# Patient Record
Sex: MALE | Race: WHITE | NOT HISPANIC OR LATINO
[De-identification: names, ages, dates, MRNs, and addresses within clinical notes are randomized per-mention and may not be internally consistent; named-entity substitution may affect disease eponyms.]

---

## 2017-02-21 ENCOUNTER — APPOINTMENT (OUTPATIENT)
Dept: PULMONOLOGY | Facility: CLINIC | Age: 77
End: 2017-02-21

## 2017-02-21 VITALS
OXYGEN SATURATION: 100 % | WEIGHT: 200 LBS | BODY MASS INDEX: 29.62 KG/M2 | RESPIRATION RATE: 17 BRPM | SYSTOLIC BLOOD PRESSURE: 110 MMHG | DIASTOLIC BLOOD PRESSURE: 74 MMHG | HEIGHT: 69 IN | HEART RATE: 71 BPM

## 2017-06-19 ENCOUNTER — OUTPATIENT (OUTPATIENT)
Dept: OUTPATIENT SERVICES | Facility: HOSPITAL | Age: 77
LOS: 1 days | End: 2017-06-19
Payer: MEDICARE

## 2017-06-19 VITALS
TEMPERATURE: 98 F | WEIGHT: 192.9 LBS | HEART RATE: 77 BPM | RESPIRATION RATE: 16 BRPM | HEIGHT: 69 IN | SYSTOLIC BLOOD PRESSURE: 128 MMHG | DIASTOLIC BLOOD PRESSURE: 77 MMHG

## 2017-06-19 DIAGNOSIS — D21.21 BENIGN NEOPLASM OF CONNECTIVE AND OTHER SOFT TISSUE OF RIGHT LOWER LIMB, INCLUDING HIP: ICD-10-CM

## 2017-06-19 DIAGNOSIS — Z01.818 ENCOUNTER FOR OTHER PREPROCEDURAL EXAMINATION: ICD-10-CM

## 2017-06-19 DIAGNOSIS — Z98.890 OTHER SPECIFIED POSTPROCEDURAL STATES: Chronic | ICD-10-CM

## 2017-06-19 DIAGNOSIS — Z98.61 CORONARY ANGIOPLASTY STATUS: Chronic | ICD-10-CM

## 2017-06-19 LAB
ALBUMIN SERPL ELPH-MCNC: 3.6 G/DL — SIGNIFICANT CHANGE UP (ref 3.3–5)
ALP SERPL-CCNC: 53 U/L — SIGNIFICANT CHANGE UP (ref 40–120)
ALT FLD-CCNC: 28 U/L — SIGNIFICANT CHANGE UP (ref 12–78)
ANION GAP SERPL CALC-SCNC: 8 MMOL/L — SIGNIFICANT CHANGE UP (ref 5–17)
AST SERPL-CCNC: 21 U/L — SIGNIFICANT CHANGE UP (ref 15–37)
BILIRUB SERPL-MCNC: 0.6 MG/DL — SIGNIFICANT CHANGE UP (ref 0.2–1.2)
BUN SERPL-MCNC: 28 MG/DL — HIGH (ref 7–23)
CALCIUM SERPL-MCNC: 8.1 MG/DL — LOW (ref 8.5–10.1)
CHLORIDE SERPL-SCNC: 110 MMOL/L — HIGH (ref 96–108)
CO2 SERPL-SCNC: 26 MMOL/L — SIGNIFICANT CHANGE UP (ref 22–31)
CREAT SERPL-MCNC: 1.5 MG/DL — HIGH (ref 0.5–1.3)
GLUCOSE SERPL-MCNC: 102 MG/DL — HIGH (ref 70–99)
HCT VFR BLD CALC: 44.6 % — SIGNIFICANT CHANGE UP (ref 39–50)
HGB BLD-MCNC: 14.9 G/DL — SIGNIFICANT CHANGE UP (ref 13–17)
MCHC RBC-ENTMCNC: 31.7 PG — SIGNIFICANT CHANGE UP (ref 27–34)
MCHC RBC-ENTMCNC: 33.3 GM/DL — SIGNIFICANT CHANGE UP (ref 32–36)
MCV RBC AUTO: 95.1 FL — SIGNIFICANT CHANGE UP (ref 80–100)
PLATELET # BLD AUTO: 192 K/UL — SIGNIFICANT CHANGE UP (ref 150–400)
POTASSIUM SERPL-MCNC: 4 MMOL/L — SIGNIFICANT CHANGE UP (ref 3.5–5.3)
POTASSIUM SERPL-SCNC: 4 MMOL/L — SIGNIFICANT CHANGE UP (ref 3.5–5.3)
PROT SERPL-MCNC: 6.5 G/DL — SIGNIFICANT CHANGE UP (ref 6–8.3)
RBC # BLD: 4.69 M/UL — SIGNIFICANT CHANGE UP (ref 4.2–5.8)
RBC # FLD: 12.7 % — SIGNIFICANT CHANGE UP (ref 10.3–14.5)
SODIUM SERPL-SCNC: 144 MMOL/L — SIGNIFICANT CHANGE UP (ref 135–145)
WBC # BLD: 6.5 K/UL — SIGNIFICANT CHANGE UP (ref 3.8–10.5)
WBC # FLD AUTO: 6.5 K/UL — SIGNIFICANT CHANGE UP (ref 3.8–10.5)

## 2017-06-19 PROCEDURE — 93010 ELECTROCARDIOGRAM REPORT: CPT | Mod: NC

## 2017-06-19 PROCEDURE — 93005 ELECTROCARDIOGRAM TRACING: CPT

## 2017-06-19 PROCEDURE — G0463: CPT

## 2017-06-19 PROCEDURE — 80053 COMPREHEN METABOLIC PANEL: CPT

## 2017-06-19 PROCEDURE — 85027 COMPLETE CBC AUTOMATED: CPT

## 2017-06-19 RX ORDER — FINASTERIDE 5 MG/1
1 TABLET, FILM COATED ORAL
Qty: 0 | Refills: 0 | COMMUNITY

## 2017-06-19 NOTE — H&P PST ADULT - PMH
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

## 2017-06-19 NOTE — H&P PST ADULT - MUSCULOSKELETAL
details… detailed exam no joint warmth/decreased ROM/diminished strength/decreased ROM due to pain/no joint swelling/no joint erythema/right shoulder no joint erythema/no joint swelling/no joint warmth/ROM intact

## 2017-06-19 NOTE — H&P PST ADULT - NSANTHOSAYNRD_GEN_A_CORE
No. KARAN screening performed.  STOP BANG Legend: 0-2 = LOW Risk; 3-4 = INTERMEDIATE Risk; 5-8 = HIGH Risk

## 2017-06-19 NOTE — H&P PST ADULT - PROBLEM SELECTOR PLAN 2
Labs- CBC, CMP and EKG  CC with Dr. Matias Rea  Pre op and Hibiclens instructions reviewed and given. Instructed to take her omeprazole, Doxazosin and Finasteride am of surgery with a sip of water.

## 2017-06-19 NOTE — H&P PST ADULT - PSH
H/O Shoulder rotated cuff surgery  Right  History of Appendectomy    History of Tonsillectomy    IH (Inguinal Hernia)  Bilateral age 7  S/P PTCA (percutaneous transluminal coronary angioplasty)  2002 has 2 stents  S/P rotator cuff repair  right 2015

## 2017-06-19 NOTE — H&P PST ADULT - PROBLEM SELECTOR PLAN 3
Patient has a BMS. Currently not on ASA due to hematuria. CC with Dr. Matias Rea.  ASA as per cardiology.

## 2017-06-23 DIAGNOSIS — D21.21 BENIGN NEOPLASM OF CONNECTIVE AND OTHER SOFT TISSUE OF RIGHT LOWER LIMB, INCLUDING HIP: ICD-10-CM

## 2017-06-23 DIAGNOSIS — Z01.818 ENCOUNTER FOR OTHER PREPROCEDURAL EXAMINATION: ICD-10-CM

## 2017-06-23 DIAGNOSIS — Z95.5 PRESENCE OF CORONARY ANGIOPLASTY IMPLANT AND GRAFT: ICD-10-CM

## 2017-08-21 ENCOUNTER — APPOINTMENT (OUTPATIENT)
Dept: PULMONOLOGY | Facility: CLINIC | Age: 77
End: 2017-08-21
Payer: MEDICARE

## 2017-08-21 VITALS
OXYGEN SATURATION: 98 % | DIASTOLIC BLOOD PRESSURE: 80 MMHG | HEART RATE: 73 BPM | RESPIRATION RATE: 17 BRPM | SYSTOLIC BLOOD PRESSURE: 110 MMHG | BODY MASS INDEX: 29.62 KG/M2 | HEIGHT: 69 IN | WEIGHT: 200 LBS

## 2017-08-21 PROCEDURE — 95012 NITRIC OXIDE EXP GAS DETER: CPT

## 2017-08-21 PROCEDURE — 94010 BREATHING CAPACITY TEST: CPT

## 2017-08-21 PROCEDURE — 99214 OFFICE O/P EST MOD 30 MIN: CPT | Mod: 25

## 2017-10-31 ENCOUNTER — FORM ENCOUNTER (OUTPATIENT)
Age: 77
End: 2017-10-31

## 2017-11-01 ENCOUNTER — OUTPATIENT (OUTPATIENT)
Dept: OUTPATIENT SERVICES | Facility: HOSPITAL | Age: 77
LOS: 1 days | End: 2017-11-01
Payer: MEDICARE

## 2017-11-01 ENCOUNTER — APPOINTMENT (OUTPATIENT)
Dept: CT IMAGING | Facility: CLINIC | Age: 77
End: 2017-11-01
Payer: MEDICARE

## 2017-11-01 DIAGNOSIS — Z98.890 OTHER SPECIFIED POSTPROCEDURAL STATES: Chronic | ICD-10-CM

## 2017-11-01 DIAGNOSIS — Z98.61 CORONARY ANGIOPLASTY STATUS: Chronic | ICD-10-CM

## 2017-11-01 DIAGNOSIS — R93.8 ABNORMAL FINDINGS ON DIAGNOSTIC IMAGING OF OTHER SPECIFIED BODY STRUCTURES: ICD-10-CM

## 2017-11-01 PROCEDURE — 71250 CT THORAX DX C-: CPT

## 2017-11-01 PROCEDURE — 71250 CT THORAX DX C-: CPT | Mod: 26

## 2017-12-25 NOTE — H&P PST ADULT - ABILITY TO HEAR (WITH HEARING AID OR HEARING APPLIANCE IF NORMALLY USED):
25-Dec-2017 21:20
Uses hearing aids bilaterally/Mildly to Moderately Impaired: difficulty hearing in some environments or speaker may need to increase volume or speak distinctly

## 2018-03-05 ENCOUNTER — APPOINTMENT (OUTPATIENT)
Dept: PULMONOLOGY | Facility: CLINIC | Age: 78
End: 2018-03-05
Payer: MEDICARE

## 2018-03-05 VITALS
BODY MASS INDEX: 28.14 KG/M2 | SYSTOLIC BLOOD PRESSURE: 110 MMHG | RESPIRATION RATE: 17 BRPM | DIASTOLIC BLOOD PRESSURE: 80 MMHG | WEIGHT: 190 LBS | HEIGHT: 69 IN | OXYGEN SATURATION: 100 % | HEART RATE: 73 BPM

## 2018-03-05 PROCEDURE — 99214 OFFICE O/P EST MOD 30 MIN: CPT | Mod: 25

## 2018-03-05 PROCEDURE — 94010 BREATHING CAPACITY TEST: CPT

## 2018-03-28 ENCOUNTER — NON-APPOINTMENT (OUTPATIENT)
Age: 78
End: 2018-03-28

## 2018-03-28 ENCOUNTER — APPOINTMENT (OUTPATIENT)
Dept: CARDIOLOGY | Facility: CLINIC | Age: 78
End: 2018-03-28
Payer: MEDICARE

## 2018-03-28 VITALS
BODY MASS INDEX: 28.14 KG/M2 | DIASTOLIC BLOOD PRESSURE: 78 MMHG | SYSTOLIC BLOOD PRESSURE: 126 MMHG | RESPIRATION RATE: 18 BRPM | HEIGHT: 69 IN | HEART RATE: 70 BPM | WEIGHT: 190 LBS

## 2018-03-28 PROCEDURE — 99213 OFFICE O/P EST LOW 20 MIN: CPT

## 2018-03-28 PROCEDURE — 93000 ELECTROCARDIOGRAM COMPLETE: CPT

## 2018-04-13 ENCOUNTER — RX RENEWAL (OUTPATIENT)
Age: 78
End: 2018-04-13

## 2018-05-16 ENCOUNTER — RX RENEWAL (OUTPATIENT)
Age: 78
End: 2018-05-16

## 2018-07-11 ENCOUNTER — NON-APPOINTMENT (OUTPATIENT)
Age: 78
End: 2018-07-11

## 2018-07-11 ENCOUNTER — APPOINTMENT (OUTPATIENT)
Dept: CARDIOLOGY | Facility: CLINIC | Age: 78
End: 2018-07-11
Payer: MEDICARE

## 2018-07-11 VITALS
BODY MASS INDEX: 27.11 KG/M2 | DIASTOLIC BLOOD PRESSURE: 80 MMHG | WEIGHT: 183 LBS | SYSTOLIC BLOOD PRESSURE: 130 MMHG | HEART RATE: 77 BPM | HEIGHT: 69 IN | RESPIRATION RATE: 16 BRPM

## 2018-07-11 PROCEDURE — 93000 ELECTROCARDIOGRAM COMPLETE: CPT

## 2018-07-11 PROCEDURE — 99214 OFFICE O/P EST MOD 30 MIN: CPT

## 2018-09-05 ENCOUNTER — NON-APPOINTMENT (OUTPATIENT)
Age: 78
End: 2018-09-05

## 2018-09-05 ENCOUNTER — APPOINTMENT (OUTPATIENT)
Dept: PULMONOLOGY | Facility: CLINIC | Age: 78
End: 2018-09-05
Payer: MEDICARE

## 2018-09-05 VITALS
BODY MASS INDEX: 27.7 KG/M2 | SYSTOLIC BLOOD PRESSURE: 110 MMHG | RESPIRATION RATE: 17 BRPM | DIASTOLIC BLOOD PRESSURE: 70 MMHG | OXYGEN SATURATION: 100 % | WEIGHT: 187 LBS | HEART RATE: 71 BPM | HEIGHT: 69 IN

## 2018-09-05 PROBLEM — D21.21 BENIGN NEOPLASM OF CONNECTIVE AND OTHER SOFT TISSUE OF RIGHT LOWER LIMB, INCLUDING HIP: Chronic | Status: ACTIVE | Noted: 2017-06-23

## 2018-09-05 PROBLEM — R22.41 LOCALIZED SWELLING, MASS AND LUMP, RIGHT LOWER LIMB: Chronic | Status: ACTIVE | Noted: 2017-06-19

## 2018-09-05 PROCEDURE — 95012 NITRIC OXIDE EXP GAS DETER: CPT

## 2018-09-05 PROCEDURE — 94010 BREATHING CAPACITY TEST: CPT

## 2018-09-05 PROCEDURE — 99214 OFFICE O/P EST MOD 30 MIN: CPT | Mod: 25

## 2018-09-05 RX ORDER — CHOLECALCIFEROL (VITAMIN D3) 25 MCG
25 MCG TABLET ORAL
Refills: 0 | Status: ACTIVE | COMMUNITY

## 2018-09-05 NOTE — PROCEDURE
[FreeTextEntry1] : PFT- spi reveals normal flows; FEV1 was 2.80 L which is 97% of predicted; normal lung volumes; normal flow volume loop \par \par FENO was 8; a normal value being less than 25\par Fractional exhaled nitric oxide (FENO) is regarded as a simple, noninvasive method for assessing eosinophilic airway inflammation. Produced by a variety of cells within the lung, nitric oxide (NO) concentrations are generally low in healthy individuals. However, high concentrations of NO appear to be involved in nonspecific host defense mechanisms and chronic inflammatory diseases such as asthma. The American Thor[employer] Society (ATS) therefore has recommended using FENO to aid in the diagnosis and monitoring of eosinophilic airway inflammation and asthma, and for identifying steroid responsive individuals whose chronic respiratory symptoms may be caused by airway inflammation. \par \par

## 2018-09-05 NOTE — ADDENDUM
[FreeTextEntry1] : Documented by Ciarra Keller acting as a scribe for Dr. Raymundo Rai on 9/5/18.\par All medical record entries made by the Scribe were at my, Dr. Raymundo Rai's, direction and personally dictated by me on 9/5/18. I have reviewed the chart and agree that the record accurately reflects my personal performance of the history, physical exam, assessment and plan. I have also personally directed, reviewed, and agree with the discharge instructions. \par \par \par \par

## 2018-09-05 NOTE — ASSESSMENT
[FreeTextEntry1] : Mr. Hassan is stable from a pulmonary perspective, exercising and asymptomatic. He has a history of asthma, COPD, abnormal CT, CAD, OSAS, and RLS\par \par Problem one: lung cancer screening\par -f/u chest CT 11/2018 \par \par Problem two: asthma/COPD\par -Quiet off all mediations at this time\par \par Asthma is believed to be caused by inherited (genetic) and environmental factor, but its exact cause is unknown. Asthma may be triggered by allergens, lung infections, or irritants in the air. Asthma triggers are different for each person.\par \par -COPD is a progressive disease and although it can’t be cured , appropriate management can slow its progression, reduce frequency and severity of exacerbations, and improve symptoms and the patient quality of life. Hospitalizations are the greatest contributor to the total COPD costs and account for up to 87% of total COPD related costs. Exacerbations are the main cause of admissions and subsequently account for the 40-75% of COPD costs. Inhaled maintenance therapy reduces the incidence of exacerbations in patients with stable COPD. Incorrect inhaler use and nonadherence are major obstacles to achieving COPD treatment goals. Many COPD patients have challenges (impaired inhalation, limited dexterity, reduced cognition: that limit their ability to correctly use their COPD treatment devices resulting in reduced symptom control. Of most importance is smoking cessation and early intervention with respiratory illnesses and contemplation for pulmonary rehab to enhance quality of life.\par \par Problem three: GERD\par -continue Nexium 40 mg QAM\par \par -Rule of 2's- Avoid eating too much, too late, too poorly, too spicy, or two hours before bed \par -Things to avoid including overeating, spicy foods, tight clothing, eating within three hours of bed, this list is not all inclusive. \par -For treatment of reflux, possible options discussed including diet control, H2 blockers, PPIs, as well as coating motility agents discussed as treatment options. Timing of meals and proximity of last meal to sleep were discussed. If symptoms persist, a formal gastrointestinal evaluation is needed.\par \par Problem four: Allergies/ sinus\par -continue nasal saline PRN \par -recommended OTC antihistamines\par \par Environmental measures for allergies were encouraged including mattress and pillow cover, air purifier, and environmental controls.\par \par Problem five: OSAS\par -intolerable of other treatments - CPAP and dental device \par -Sleep guard recommended QD at night \par -continue to use Benadryl PRN \par -Recommended Oxy-aid\par -Discussed the risks/associations with coronary artery disease, atrial fibrillation, arrhythmia, memory loss, issues with concentration, stroke risk, hypertension, nocturia, chronic reflux/Interiano’s esophagus some but not all inclusive. Treatment options discussed including CPAP/BiPAP machine, oral appliance, ProVent therapy, Oxy-Aid by [employer], new technologies, or positional sleep. \par \par Problem six: Fatigue\par -CoQ-10 200 recommended \par -continue supplemental vitamin D\par \par Problem seven: RLS\par -off medications at this time\par \par Problem eight: health maintenance\par -recommended a yearly flu shot after October 15\par -recommended strep pneumonia vaccines: Prevnar-13 vaccine, followed by Pneumo vaccine 23 on year following\par -recommended early intervention for URIs\par -recommended osteoporosis evaluations\par -recommended early dermatological evaluations\par -recommended after the age of 50 to the age of 70, colonoscopy every 5 years\par -encouraged early intervention\par \par \par F/u in 6 months \par pt is encouraged to call or fax the office with any questions or concerns.

## 2018-09-05 NOTE — HISTORY OF PRESENT ILLNESS
[FreeTextEntry1] : Mr. Hassan is a 77 y/o male presenting for a visit for abnormal chest CT, allergic rhinitis, Asthma, GERD, KARAN, SOB. His chief complain is GERD. \par \par -he states that he is exercising and going to the gym\par -he states that he has occasional GERD/reflux\par -He states that he has been sleeping well\par -he states that his memory and concentration are stable\par -he denies SOB on inclines and stairs\par -He denies any headaches, nausea, vomiting, fever, chills, sweats, chest pain, chest pressure, diarrhea, constipation, dysphagia, dizziness, leg swelling, leg pain, itchy eyes, itchy ears, heartburn, , or sour taste in the mouth.\par \par

## 2018-09-05 NOTE — PHYSICAL EXAM

## 2018-09-05 NOTE — REASON FOR VISIT
[Follow-Up] : a follow-up visit [FreeTextEntry1] : abnormal chest CT, allergic rhinitis, Asthma, GERD, KARAN, SOB

## 2018-10-10 ENCOUNTER — RX RENEWAL (OUTPATIENT)
Age: 78
End: 2018-10-10

## 2018-11-12 ENCOUNTER — APPOINTMENT (OUTPATIENT)
Dept: CARDIOLOGY | Facility: CLINIC | Age: 78
End: 2018-11-12
Payer: MEDICARE

## 2018-11-12 ENCOUNTER — NON-APPOINTMENT (OUTPATIENT)
Age: 78
End: 2018-11-12

## 2018-11-12 VITALS
DIASTOLIC BLOOD PRESSURE: 74 MMHG | HEIGHT: 69 IN | OXYGEN SATURATION: 99 % | WEIGHT: 188 LBS | HEART RATE: 68 BPM | BODY MASS INDEX: 27.85 KG/M2 | SYSTOLIC BLOOD PRESSURE: 124 MMHG | RESPIRATION RATE: 15 BRPM

## 2018-11-12 PROCEDURE — 93000 ELECTROCARDIOGRAM COMPLETE: CPT

## 2018-11-12 PROCEDURE — 93922 UPR/L XTREMITY ART 2 LEVELS: CPT

## 2018-11-12 PROCEDURE — 99213 OFFICE O/P EST LOW 20 MIN: CPT

## 2018-11-12 PROCEDURE — ZZZZZ: CPT

## 2018-11-12 NOTE — REVIEW OF SYSTEMS
[Dyspnea on exertion] : dyspnea during exertion [Negative] : Heme/Lymph [Shortness Of Breath] : no shortness of breath [Chest  Pressure] : no chest pressure [Chest Pain] : no chest pain [Lower Ext Edema] : no extremity edema [Leg Claudication] : no intermittent leg claudication [Palpitations] : no palpitations

## 2018-11-12 NOTE — PHYSICAL EXAM
[General Appearance - Well Developed] : well developed [Normal Appearance] : normal appearance [Well Groomed] : well groomed [General Appearance - Well Nourished] : well nourished [No Deformities] : no deformities [General Appearance - In No Acute Distress] : no acute distress [Normal Conjunctiva] : the conjunctiva exhibited no abnormalities [Normal Oral Mucosa] : normal oral mucosa [Normal Oropharynx] : normal oropharynx [Normal Jugular Venous A Waves Present] : normal jugular venous A waves present [Normal Jugular Venous V Waves Present] : normal jugular venous V waves present [No Jugular Venous Jewell A Waves] : no jugular venous jewell A waves [Respiration, Rhythm And Depth] : normal respiratory rhythm and effort [Exaggerated Use Of Accessory Muscles For Inspiration] : no accessory muscle use [Auscultation Breath Sounds / Voice Sounds] : lungs were clear to auscultation bilaterally [Bowel Sounds] : normal bowel sounds [Abdomen Soft] : soft [Abnormal Walk] : normal gait [Nail Clubbing] : no clubbing of the fingernails [Cyanosis, Localized] : no localized cyanosis [Skin Color & Pigmentation] : normal skin color and pigmentation [] : no rash [Oriented To Time, Place, And Person] : oriented to person, place, and time [Affect] : the affect was normal [Mood] : the mood was normal [No Anxiety] : not feeling anxious [5th Left ICS - MCL] : palpated at the 5th LICS in the midclavicular line [Normal] : normal [No Precordial Heave] : no precordial heave was noted [Normal Rate] : normal [Rhythm Regular] : regular [Normal S1] : normal S1 [Normal S2] : normal S2 [No Gallop] : no gallop heard [No Murmur] : no murmurs heard [II] : a grade 2 [I] : a grade 1 [2+] : left 2+ [No Abnormalities] : the abdominal aorta was not enlarged and no bruit was heard [No Pitting Edema] : no pitting edema present [Rt] : varicose veins of the right leg noted [Lt] : varicose veins of the left leg noted [S3] : no S3 [S4] : no S4 [Right Carotid Bruit] : no bruit heard over the right carotid [Left Carotid Bruit] : no bruit heard over the left carotid [Right Femoral Bruit] : no bruit heard over the right femoral artery [Left Femoral Bruit] : no bruit heard over the left femoral artery

## 2018-11-12 NOTE — DISCUSSION/SUMMARY
[FreeTextEntry1] : This is a 77-year-old male with past medical history significant for coronary artery disease, status post stent to right coronary artery, hypertension, hyperlipidemia, statin intolerance, mitral valve regurgitation, who comes in for followup cardiac evaluation. \par The patient denies chest pain, shortness of breath, dizziness or syncope. He has been stable on his current therapy for lipid reduction. He is going on her cruise. He will have new blood work in December. Unfortunately his insurance would not cover his PCSK9 therapy. This may require another attempt of his LDL cholesterol is not at goal.\par \par The patient was on Praluent because of statin intolerance. Because of the increased cost of the medication and lack of coverage he is now on Crestor 5 mg daily, and Zetia 10 mg per day.\par Electrocardiogram done November 12, 2018 demonstrated normal sinus rhythm rate 66 beats per minute and is otherwise unremarkable.\par The patient gets occasional cramping in his legs on walking. Given his history of coronary artery disease and other risk factors, and ankle-brachial index was done today which revealed no significant peripheral vascular disease the\par \par He had an echo Doppler examination March 22, 2017 which revealed mild mitral and tricuspid valve regurgitation, with thinning and dyskinesis involving the intra-atrial septum the estimated ejection fraction 63%. He had a nuclear stress test done October 12, 2002 which demonstrated a small region of perfusion abnormality inferior wall consistent localized coronary artery disease. His estimated ejection fraction of 65%.\par He had a cardiac catheterization done November 10, 2011 which revealed a 40% lesion in the left anterior descending artery, luminal irregularities in the distal left main artery and circumflex artery, 20% tubular stenosis at the site of the prior stent. Medical therapy was recommended at that time.\par He had a bare-metal stent placed in the proximal mid right coronary artery in February 2002.\par He will followup with me in 3 months. We will repeat his blood work prior to that visit.\par The patient understands that aerobic exercises must be increased to 40 minutes 4 times per week. A detailed discussion of lifestyle modification was done today. The patient has a good understanding of the diagnosis, and treatment plan. Lifestyle modification was also outlined.

## 2018-11-12 NOTE — REASON FOR VISIT
[Follow-Up - Clinic] : a clinic follow-up of [Coronary Artery Disease] : coronary artery disease [Hyperlipidemia] : hyperlipidemia [Hypertension] : hypertension [Mitral Regurgitation] : mitral regurgitation [Palpitations] : palpitations [FreeTextEntry1] :  78-year-old male with a past medical history significant for coronary artery disease, s/p  stent to right coronary artery, hypertension, hyperlipidemia, statin intolerance, mitral valve regurgitation, who comes in for followup cardiac evaluation.

## 2018-11-14 ENCOUNTER — FORM ENCOUNTER (OUTPATIENT)
Age: 78
End: 2018-11-14

## 2018-11-15 ENCOUNTER — APPOINTMENT (OUTPATIENT)
Dept: CT IMAGING | Facility: CLINIC | Age: 78
End: 2018-11-15

## 2018-11-15 ENCOUNTER — OUTPATIENT (OUTPATIENT)
Dept: OUTPATIENT SERVICES | Facility: HOSPITAL | Age: 78
LOS: 1 days | End: 2018-11-15
Payer: MEDICARE

## 2018-11-15 DIAGNOSIS — Z00.8 ENCOUNTER FOR OTHER GENERAL EXAMINATION: ICD-10-CM

## 2018-11-15 DIAGNOSIS — Z98.61 CORONARY ANGIOPLASTY STATUS: Chronic | ICD-10-CM

## 2018-11-15 DIAGNOSIS — Z98.890 OTHER SPECIFIED POSTPROCEDURAL STATES: Chronic | ICD-10-CM

## 2018-11-15 PROCEDURE — 71250 CT THORAX DX C-: CPT

## 2018-11-15 PROCEDURE — 71250 CT THORAX DX C-: CPT | Mod: 26

## 2018-11-28 ENCOUNTER — RX RENEWAL (OUTPATIENT)
Age: 78
End: 2018-11-28

## 2019-01-09 ENCOUNTER — APPOINTMENT (OUTPATIENT)
Dept: PULMONOLOGY | Facility: CLINIC | Age: 79
End: 2019-01-09
Payer: MEDICARE

## 2019-01-09 VITALS
DIASTOLIC BLOOD PRESSURE: 70 MMHG | HEIGHT: 68 IN | WEIGHT: 186 LBS | RESPIRATION RATE: 17 BRPM | HEART RATE: 72 BPM | OXYGEN SATURATION: 98 % | SYSTOLIC BLOOD PRESSURE: 110 MMHG | BODY MASS INDEX: 28.19 KG/M2

## 2019-01-09 DIAGNOSIS — G62.9 POLYNEUROPATHY, UNSPECIFIED: ICD-10-CM

## 2019-01-09 PROCEDURE — 94010 BREATHING CAPACITY TEST: CPT

## 2019-01-09 PROCEDURE — 99214 OFFICE O/P EST MOD 30 MIN: CPT | Mod: 25

## 2019-01-09 PROCEDURE — 94729 DIFFUSING CAPACITY: CPT

## 2019-01-09 NOTE — ADDENDUM
[FreeTextEntry1] : Documented by Evelyn Devries acting as a scribe for Dr. Raymundo Rai on 01/09/2019.\par \par All medical record entries made by the Scribe were at my, Dr. Raymundo Rai's, direction and personally dictated by me on 01/09/2019. I have reviewed the chart and agree that the record accurately reflects my personal performance of the history, physical exam, assessment and plan. I have also personally directed, reviewed, and agree with the discharge instructions.

## 2019-01-09 NOTE — REASON FOR VISIT
[Follow-Up] : a follow-up visit [FreeTextEntry1] : new neuropathy (feet) abnormal chest CT, allergic rhinitis, Asthma, GERD, KARAN, SOB

## 2019-01-09 NOTE — HISTORY OF PRESENT ILLNESS
[FreeTextEntry1] : Mr. Hassan is a 77 y/o male presenting for a visit for abnormal chest CT, allergic rhinitis, Asthma, GERD, KARAN, SOB. His chief complaint is his neuropathy.\par -he states he has a new neuropathy in his left foot\par -he states his toes were initially painful to touch, now numb\par -he notes he is currently taking 400mg Gabapentin by Dr. Webster \par -he states his sleep is better but occasionally cannot fall asleep\par -he notes his bowels are regular\par -he states his memory is getting worse\par -he states his sinuses are clear\par -he notes he is not moving at night\par -he states he sleeps 5-6 hours each night\par -he denies any SOB, SOB on back, SOB in the middle of the night, coughing, wheezing, nausea, vomiting, fever, chills, sweats, chest pain, chest pressure, diarrhea, constipation, dysphagia, dizziness, heartburn, reflux, or sour taste in the mouth.

## 2019-01-09 NOTE — PROCEDURE
[FreeTextEntry1] : PFT-spi reveals mild obstructive dysfunction with FEV1 of   2.55   , which is  89% of predicted, with normal diffusion of 17.8 which is 89% of predicted,  normal flow volume loop \par \par \par Chest CT (11.15.2018) reveals the pulmonary nodules are unchanged since 03.19.2013

## 2019-01-09 NOTE — ASSESSMENT
[FreeTextEntry1] : Mr. Hassan is stable from a pulmonary perspective, exercising and asymptomatic. He has a history of asthma, COPD, abnormal CT, CAD, OSAS, RLS and new neuropathy (feet)\par \par Problem one: lung cancer screening (stable 11/18)\par -f/u chest CT 11/2019\par \par Problem two: asthma/COPD\par -Quiet off all mediations at this time\par \par Asthma is believed to be caused by inherited (genetic) and environmental factor, but its exact cause is unknown. Asthma may be triggered by allergens, lung infections, or irritants in the air. Asthma triggers are different for each person.\par \par -COPD is a progressive disease and although it can’t be cured , appropriate management can slow its progression, reduce frequency and severity of exacerbations, and improve symptoms and the patient quality of life. Hospitalizations are the greatest contributor to the total COPD costs and account for up to 87% of total COPD related costs. Exacerbations are the main cause of admissions and subsequently account for the 40-75% of COPD costs. Inhaled maintenance therapy reduces the incidence of exacerbations in patients with stable COPD. Incorrect inhaler use and nonadherence are major obstacles to achieving COPD treatment goals. Many COPD patients have challenges (impaired inhalation, limited dexterity, reduced cognition: that limit their ability to correctly use their COPD treatment devices resulting in reduced symptom control. Of most importance is smoking cessation and early intervention with respiratory illnesses and contemplation for pulmonary rehab to enhance quality of life.\par \par Problem three: GERD\par -continue Nexium 40 mg QAM\par \par -Rule of 2's- Avoid eating too much, too late, too poorly, too spicy, or two hours before bed \par -Things to avoid including overeating, spicy foods, tight clothing, eating within three hours of bed, this list is not all inclusive. \par -For treatment of reflux, possible options discussed including diet control, H2 blockers, PPIs, as well as coating motility agents discussed as treatment options. Timing of meals and proximity of last meal to sleep were discussed. If symptoms persist, a formal gastrointestinal evaluation is needed.\par \par Problem four: Allergies/ sinus\par -continue nasal saline PRN \par -recommended OTC antihistamines\par \par Environmental measures for allergies were encouraged including mattress and pillow cover, air purifier, and environmental controls.\par \par Problem five: OSAS\par -intolerable of other treatments - CPAP and dental device \par -Sleep guard recommended QD at night \par -continue to use Benadryl PRN \par -Recommended Oxy-aid\par -Discussed the risks/associations with coronary artery disease, atrial fibrillation, arrhythmia, memory loss, issues with concentration, stroke risk, hypertension, nocturia, chronic reflux/Interiano’s esophagus some but not all inclusive. Treatment options discussed including CPAP/BiPAP machine, oral appliance, ProVent therapy, Oxy-Aid by [employer], new technologies, or positional sleep. \par \par Problem six: Fatigue\par -CoQ-10 200 recommended \par -continue supplemental vitamin D\par \par Problem seven: RLS\par -off medications at this time\par \par Problem 8: Neuropathy\par -Continue Neurontin \par -recommended B complex Vitamin \par -recommended P6 cream\par -recommended L Glutamine 500mg  2x BID \par \par Problem 9: health maintenance\par -s/p 2018 influenza vaccine\par -recommended strep pneumonia vaccines: Prevnar-13 vaccine, followed by Pneumo vaccine 23 on year following\par -recommended early intervention for URIs\par -recommended osteoporosis evaluations\par -recommended early dermatological evaluations\par -recommended after the age of 50 to the age of 70, colonoscopy every 5 years\par -encouraged early intervention\par \par \par F/u in 6 months \par pt is encouraged to call or fax the office with any questions or concerns.

## 2019-03-11 ENCOUNTER — RX RENEWAL (OUTPATIENT)
Age: 79
End: 2019-03-11

## 2019-03-12 ENCOUNTER — RX RENEWAL (OUTPATIENT)
Age: 79
End: 2019-03-12

## 2019-05-13 ENCOUNTER — APPOINTMENT (OUTPATIENT)
Dept: CARDIOLOGY | Facility: CLINIC | Age: 79
End: 2019-05-13
Payer: MEDICARE

## 2019-05-13 ENCOUNTER — NON-APPOINTMENT (OUTPATIENT)
Age: 79
End: 2019-05-13

## 2019-05-13 VITALS
RESPIRATION RATE: 15 BRPM | HEART RATE: 68 BPM | WEIGHT: 188 LBS | DIASTOLIC BLOOD PRESSURE: 79 MMHG | HEIGHT: 68 IN | SYSTOLIC BLOOD PRESSURE: 125 MMHG | BODY MASS INDEX: 28.49 KG/M2

## 2019-05-13 PROCEDURE — 93922 UPR/L XTREMITY ART 2 LEVELS: CPT

## 2019-05-13 PROCEDURE — 99213 OFFICE O/P EST LOW 20 MIN: CPT

## 2019-05-13 PROCEDURE — 93000 ELECTROCARDIOGRAM COMPLETE: CPT

## 2019-07-10 ENCOUNTER — APPOINTMENT (OUTPATIENT)
Dept: CARDIOLOGY | Facility: CLINIC | Age: 79
End: 2019-07-10

## 2019-07-15 ENCOUNTER — APPOINTMENT (OUTPATIENT)
Dept: PULMONOLOGY | Facility: CLINIC | Age: 79
End: 2019-07-15
Payer: MEDICARE

## 2019-07-15 ENCOUNTER — NON-APPOINTMENT (OUTPATIENT)
Age: 79
End: 2019-07-15

## 2019-07-15 VITALS
BODY MASS INDEX: 28.19 KG/M2 | OXYGEN SATURATION: 97 % | DIASTOLIC BLOOD PRESSURE: 70 MMHG | RESPIRATION RATE: 15 BRPM | SYSTOLIC BLOOD PRESSURE: 118 MMHG | WEIGHT: 186 LBS | HEART RATE: 66 BPM | HEIGHT: 68 IN

## 2019-07-15 PROCEDURE — 99214 OFFICE O/P EST MOD 30 MIN: CPT | Mod: 25

## 2019-07-15 PROCEDURE — 94010 BREATHING CAPACITY TEST: CPT

## 2019-07-15 NOTE — PROCEDURE
[FreeTextEntry1] : PFT - spi reveals normal flows; FEV1 is 2.77 which is 101% of predicted, normal flow volume loop

## 2019-07-15 NOTE — HISTORY OF PRESENT ILLNESS
[FreeTextEntry1] : Mr. Hassan is a 79 year old male with a history of abnormal chest CT, allergic rhinitis, asthma, centrilobular emphysema, GERD, KARAN, and SOB presenting to the office today for a follow up visit. His chief complaint is neuropathic leg pains.\par -he states that he has been taking Gabapentin for his neuropathic pains in his left leg. he has not been able to walk much now due to these pains\par -he states that he has been sleeping well and getting enough sleep. he does not need to wake up during the night to urinate\par -he feels the Gabapentin has been making him groggy although it has helped his neuropathy. he takes a total of 1200 mg 3x per day\par -his weight has been stable and his appetite has been good. he feels he has been eating too much junk food / sugary food, although he does not eat much fried food now\par -his weight has been stable\par -he states that his bowels have been regular\par -he reports that his sense of taste and smell have been good \par -his memory and concentration have been poor. he has noticed himself forgetting peoples names and things to do frequently \par -he denies any headaches, nausea, vomiting, fever, chills, sweats, chest pain, chest pressure, diarrhea, constipation, dysphagia, dizziness, leg swelling, itchy eyes, itchy ears, heartburn, reflux, or sour taste in the mouth.

## 2019-07-15 NOTE — ASSESSMENT
[FreeTextEntry1] : Mr. Hassan is stable from a pulmonary perspective, exercising and asymptomatic. He has a history of asthma, COPD, abnormal CT, CAD, OSAS, RLS and new neuropathy (feet). His number one issue is still neuropathy. \par \par Problem 1: lung cancer screening (stable 11/18)\par -f/u chest CT 11/2019\par -Lung Cancer Screening is recommended for people between the ages of 55 and 80 with prior 30+ pack year smoking histories. There is irrefutable evidence for realization of lung cancer screening based on two large randomized control trials demonstrating relative reduction in lung cancer mortality for patients undergoing low-dose CT scanning. Risks and benefits reviewed with the patient. \par \par Problem 2: asthma / COPD\par -Quiet off all mediations at this time\par \par -Asthma is believed to be caused by inherited (genetic) and environmental factor, but its exact cause is unknown. Asthma may be triggered by allergens, lung infections, or irritants in the air. Asthma triggers are different for each person \par -Inhaler technique reviewed as well as oral hygiene techniques reviewed with patient. Avoidance of cold air, extremes of temperature, rescue inhaler should be used before exercise. Order of medication reviewed with patient. Recommended use of a cool mist humidifier in the bedroom \par -COPD is a progressive disease and although it can’t be cured , appropriate management can slow its progression, reduce frequency and severity of exacerbations, and improve symptoms and the patient quality of life. Hospitalizations are the greatest contributor to the total COPD costs and account for up to 87% of total COPD related costs. Exacerbations are the main cause of admissions and subsequently account for the 40-75% of COPD costs. Inhaled maintenance therapy reduces the incidence of exacerbations in patients with stable COPD. Incorrect inhaler use and nonadherence are major obstacles to achieving COPD treatment goals. Many COPD patients have challenges (impaired inhalation, limited dexterity, reduced cognition: that limit their ability to correctly use their COPD treatment devices resulting in reduced symptom control. Of most importance is smoking cessation and early intervention with respiratory illnesses and contemplation for pulmonary rehab to enhance quality of life.\par \par Problem 3: GERD\par -continue Nexium 40 mg QAM\par \par -Rule of 2's- Avoid eating too much, too late, too poorly, too spicy, or two hours before bed \par -Things to avoid including overeating, spicy foods, tight clothing, eating within three hours of bed, this list is not all inclusive. \par -For treatment of reflux, possible options discussed including diet control, H2 blockers, PPIs, as well as coating motility agents discussed as treatment options. Timing of meals and proximity of last meal to sleep were discussed. If symptoms persist, a formal gastrointestinal evaluation is needed.\par \par Problem 4: Allergies/ sinus\par -continue nasal saline PRN \par -recommended OTC antihistamines\par Environmental measures for allergies were encouraged including mattress and pillow cover, air purifier, and environmental controls.\par \par Problem 5: OSAS\par -recommended to use "Chin Strap" \par -intolerable of other treatments - CPAP and dental device \par -Sleep guard recommended QD at night \par -continue to use Benadryl PRN \par -Recommended Oxy-aid\par -Discussed the risks/associations with coronary artery disease, atrial fibrillation, arrhythmia, memory loss, issues with concentration, stroke risk, hypertension, nocturia, chronic reflux/Interiano’s esophagus some but not all inclusive. Treatment options discussed including CPAP/BiPAP machine, oral appliance, ProVent therapy, Oxy-Aid by [employer], new technologies, or positional sleep. \par \par Problem 6: Fatigue\par -CoQ-10 200 recommended \par -continue supplemental vitamin D\par \par Problem 7: RLS\par -off medications at this time\par Restless Legs Syndrome (RLS), also known as Reyes- Ekbom Disease, is a common sleep -related movement disorder. About 1 in 10 adults in the [place] have problems from restless leg syndrome. It also can be seen in about 2% of children. Women are twice as likely as men to have RLS. People with RLS will have symptoms most often during times when they are less active, especially at bedtime. RLS most often causes an overwhelming urge to move your legs and sometimes other parts of your body. This urge is associated with unpleasant sensations in different parts of the body. The symptoms can be mild to severe and can affect your ability to go to sleep and stay asleep. People with RLS often sleep less at night and feel more tired during the day. \par \par Problem 8: Neuropathy\par -Continue Neurontin \par -recommended B complex Vitamin \par -recommended P6 cream\par -recommended L Glutamine 500 mg BID \par -recommended to use Alpha- Lipoic Acid 500 mg BID\par \par Problem 9: health maintenance\par -s/p 2018 influenza vaccine\par -recommended strep pneumonia vaccines: Prevnar-13 vaccine, followed by Pneumo vaccine 23 on year following\par -recommended early intervention for URIs\par -recommended osteoporosis evaluations\par -recommended early dermatological evaluations\par -recommended after the age of 50 to the age of 70, colonoscopy every 5 years\par -encouraged early intervention\par \par \par F/u in 6 months - [facility] \par pt is encouraged to call or fax the office with any questions or concerns.

## 2019-07-15 NOTE — PHYSICAL EXAM
[General Appearance - Well Developed] : well developed [Well Groomed] : well groomed [General Appearance - Well Nourished] : well nourished [Normal Appearance] : normal appearance [No Deformities] : no deformities [General Appearance - In No Acute Distress] : no acute distress [Eyelids - No Xanthelasma] : the eyelids demonstrated no xanthelasmas [Normal Conjunctiva] : the conjunctiva exhibited no abnormalities [Normal Oropharynx] : normal oropharynx [Neck Cervical Mass (___cm)] : no neck mass was observed [Neck Appearance] : the appearance of the neck was normal [Jugular Venous Distention Increased] : there was no jugular-venous distention [Thyroid Diffuse Enlargement] : the thyroid was not enlarged [Thyroid Nodule] : there were no palpable thyroid nodules [Heart Rate And Rhythm] : heart rate and rhythm were normal [Heart Sounds] : normal S1 and S2 [Murmurs] : no murmurs present [Exaggerated Use Of Accessory Muscles For Inspiration] : no accessory muscle use [Respiration, Rhythm And Depth] : normal respiratory rhythm and effort [Auscultation Breath Sounds / Voice Sounds] : lungs were clear to auscultation bilaterally [Abdomen Tenderness] : non-tender [Abdomen Soft] : soft [Abdomen Mass (___ Cm)] : no abdominal mass palpated [Gait - Sufficient For Exercise Testing] : the gait was sufficient for exercise testing [Abnormal Walk] : normal gait [Cyanosis, Localized] : no localized cyanosis [Petechial Hemorrhages (___cm)] : no petechial hemorrhages [Nail Clubbing] : no clubbing of the fingernails [Skin Color & Pigmentation] : normal skin color and pigmentation [No Venous Stasis] : no venous stasis [] : no rash [Skin Lesions] : no skin lesions [No Skin Ulcers] : no skin ulcer [Deep Tendon Reflexes (DTR)] : deep tendon reflexes were 2+ and symmetric [Sensation] : the sensory exam was normal to light touch and pinprick [No Xanthoma] : no  xanthoma was observed [Oriented To Time, Place, And Person] : oriented to person, place, and time [No Focal Deficits] : no focal deficits [Impaired Insight] : insight and judgment were intact [Affect] : the affect was normal [II] : II [FreeTextEntry1] : I:E ratio 1:3; clear

## 2019-07-15 NOTE — REASON FOR VISIT
[Follow-Up] : a follow-up visit [FreeTextEntry1] : abnormal chest CT, allergic rhinitis, asthma, centrilobular emphysema, GERD, KARAN, and SOB

## 2019-07-15 NOTE — ADDENDUM
[FreeTextEntry1] : All medical record entries made by pat Ramires were at Dr. Raymundo Rai's, direction and personally dictated by me on 07/15/2019. I have reviewed the chart and agree that the record accurately reflects my personal performance of the history, physical exam, assessment and plan. I have also personally directed, reviewed, and agree with the discharge instructions.

## 2019-07-17 ENCOUNTER — APPOINTMENT (OUTPATIENT)
Dept: CARDIOLOGY | Facility: CLINIC | Age: 79
End: 2019-07-17
Payer: MEDICARE

## 2019-07-17 VITALS
BODY MASS INDEX: 28.19 KG/M2 | WEIGHT: 186 LBS | HEIGHT: 68 IN | DIASTOLIC BLOOD PRESSURE: 70 MMHG | RESPIRATION RATE: 15 BRPM | SYSTOLIC BLOOD PRESSURE: 127 MMHG | HEART RATE: 64 BPM

## 2019-07-17 VITALS — HEIGHT: 68 IN | WEIGHT: 185 LBS | BODY MASS INDEX: 28.04 KG/M2

## 2019-07-17 PROCEDURE — 99213 OFFICE O/P EST LOW 20 MIN: CPT

## 2019-09-18 ENCOUNTER — APPOINTMENT (OUTPATIENT)
Dept: VASCULAR SURGERY | Facility: CLINIC | Age: 79
End: 2019-09-18
Payer: MEDICARE

## 2019-09-18 VITALS
SYSTOLIC BLOOD PRESSURE: 128 MMHG | DIASTOLIC BLOOD PRESSURE: 78 MMHG | HEART RATE: 61 BPM | HEIGHT: 69 IN | WEIGHT: 185 LBS | TEMPERATURE: 97.4 F | BODY MASS INDEX: 27.4 KG/M2

## 2019-09-18 PROCEDURE — 93970 EXTREMITY STUDY: CPT

## 2019-09-18 PROCEDURE — 99203 OFFICE O/P NEW LOW 30 MIN: CPT

## 2019-09-18 NOTE — DATA REVIEWED
[FreeTextEntry1] : Duplex: no DVTs, no SVTs bl \par + GSV >2sec reflux bl, R GSV diameter: 6.3mm, L GSV diameter: 6.1 mm\par R varicose veins diameter: 7.2mm with >2 sec reflux\par L varicose veins diameter: 6.2 mm with >2 sec reflux\par \par

## 2019-09-18 NOTE — PHYSICAL EXAM
[2+] : left 2+ [1+] : left 1+ [Ankle Swelling (On Exam)] : present [Ankle Swelling On The Right] : mild [Varicose Veins Of Lower Extremities] : bilaterally [Ankle Swelling Bilaterally] : severe [] : bilaterally [Ankle Swelling On The Left] : moderate [No Rash or Lesion] : No rash or lesion [Alert] : alert [Oriented to Person] : oriented to person [Oriented to Place] : oriented to place [Oriented to Time] : oriented to time [Calm] : calm [de-identified] : well in NAD  [de-identified] : PARIL  [de-identified] : Flat, soft and non tender  [FreeTextEntry1] : Bilateral lower extremity venous stasis changes in the gaiter areas. Bilateral varicose veins, right anterior shin and more significant on the left in the distal medial thigh and proximal calf.\par CEAP C4a [de-identified] : Cooperative

## 2019-09-18 NOTE — ASSESSMENT
[Arterial/Venous Disease] : arterial/venous disease [Other: _____] : [unfilled] [FreeTextEntry1] : Mr. ROSCOE CRUZ is a 79 year with persistent and worsening bilateral lower extremity venous insufficiency, CEAP classification C3 which is  unresponsive to at least 3 months of compression stockings 20-30 mmHg, leg elevation, exercise and over the counter pain medication_(Ibuprofen).  Patient has complaints of worsening bilateral leg discomfort with swelling, fatigue, heaviness, achiness, cramping, restlessness, and painful varicosities.  The patient’s symptoms interfere with their ADL’s, such as walking, shopping and house work. Patient has intact pulses in both legs without evidence of arterial insufficiency.  \par \par Treatment is indicated not for cosmetic reasons but for symptomatic venous reflux disease with symptoms which is refractory to conservative therapy. Venous duplex study demonstrates bilateral  lower extremity venous insufficiency. The need for definitive effective treatment is based on severe, interfering and worsening reflux symptoms with evidence of venous insufficiency on venous ultrasound. \par \par Patient is a candidate for endovenous ablation treatment of the bilateral GSV and bl stab phlebectomies. \par The risks and benefits of endovenous ablation treatment versus continued conservative management were discussed with the patient.  Patient chooses endovenous ablation treatments. Treatment plan to be scheduled. \par \par

## 2019-09-18 NOTE — HISTORY OF PRESENT ILLNESS
[FreeTextEntry1] : Mr. ROSCOE CRUZ is a 79 year old m who presents for evaluation of  bilateral leg discomfort, left leg worse than right. Patient's leg discomfort is associated with leg swelling, fatigue, heaviness, achiness, muscle cramping and skin darkening at the ankles. \par Patient complains of bilateral painful varicose veins.\par Patient's symptoms have persisted despite conservative management with leg elevation, exercise, attempted weight loss and compression stocking use for more than 3 months. \par Patient's symptoms are aggravated by weight bearing, prolonged standing, prolonged sitting, extended travel and exercise.\par Patient's symptoms are partially alleviated by wearing compression stockings\par Patient's risks factor for venous disease are history of varicose veins\par Patient's symptoms interfere with the patient's ADLs such as work, shopping and housekeeping.  \par Previous treatment, vein procedures and vein surgeries include: wearing compression stockings for more than 3 months with little or no relief, and right leg varicose veins removed in the past.\par \par

## 2019-09-26 ENCOUNTER — RX RENEWAL (OUTPATIENT)
Age: 79
End: 2019-09-26

## 2019-10-30 ENCOUNTER — APPOINTMENT (OUTPATIENT)
Dept: CARDIOLOGY | Facility: CLINIC | Age: 79
End: 2019-10-30
Payer: MEDICARE

## 2019-10-30 VITALS
HEIGHT: 69 IN | RESPIRATION RATE: 15 BRPM | HEART RATE: 57 BPM | WEIGHT: 187 LBS | SYSTOLIC BLOOD PRESSURE: 130 MMHG | DIASTOLIC BLOOD PRESSURE: 84 MMHG | BODY MASS INDEX: 27.7 KG/M2

## 2019-10-30 DIAGNOSIS — Z87.898 PERSONAL HISTORY OF OTHER SPECIFIED CONDITIONS: ICD-10-CM

## 2019-10-30 DIAGNOSIS — Z86.79 PERSONAL HISTORY OF OTHER DISEASES OF THE CIRCULATORY SYSTEM: ICD-10-CM

## 2019-10-30 PROCEDURE — 99214 OFFICE O/P EST MOD 30 MIN: CPT

## 2019-11-25 ENCOUNTER — RX RENEWAL (OUTPATIENT)
Age: 79
End: 2019-11-25

## 2019-11-26 ENCOUNTER — RX RENEWAL (OUTPATIENT)
Age: 79
End: 2019-11-26

## 2019-12-01 ENCOUNTER — FORM ENCOUNTER (OUTPATIENT)
Age: 79
End: 2019-12-01

## 2019-12-02 ENCOUNTER — OUTPATIENT (OUTPATIENT)
Dept: OUTPATIENT SERVICES | Facility: HOSPITAL | Age: 79
LOS: 1 days | End: 2019-12-02
Payer: MEDICARE

## 2019-12-02 ENCOUNTER — APPOINTMENT (OUTPATIENT)
Dept: CT IMAGING | Facility: CLINIC | Age: 79
End: 2019-12-02
Payer: MEDICARE

## 2019-12-02 DIAGNOSIS — Z98.890 OTHER SPECIFIED POSTPROCEDURAL STATES: Chronic | ICD-10-CM

## 2019-12-02 DIAGNOSIS — Z98.61 CORONARY ANGIOPLASTY STATUS: Chronic | ICD-10-CM

## 2019-12-02 DIAGNOSIS — Z00.8 ENCOUNTER FOR OTHER GENERAL EXAMINATION: ICD-10-CM

## 2019-12-02 PROCEDURE — 71250 CT THORAX DX C-: CPT

## 2019-12-02 PROCEDURE — 71250 CT THORAX DX C-: CPT | Mod: 26

## 2019-12-03 ENCOUNTER — APPOINTMENT (OUTPATIENT)
Dept: VASCULAR SURGERY | Facility: CLINIC | Age: 79
End: 2019-12-03
Payer: MEDICARE

## 2019-12-03 PROCEDURE — 36475 ENDOVENOUS RF 1ST VEIN: CPT | Mod: LT

## 2019-12-03 NOTE — PROCEDURE
[FreeTextEntry1] : left GSV RFA [FreeTextEntry3] : Procedural safety checklist and time out completed:\par Confirmed patient identification (Patient Name, , and/or medical record number including when possible affirmation by patient or parent/family/other).\par Confirmed procedure with the patient. Consent present, accurate and signed. \par Confirmed special equipment and supplies are present.\par Sterility confirmed. Position verified. \par Site/ side is marked and visible and confirmed. \par Procedure confirmed by consent. Accurate consent including side and site.\par Review of medical records, including venous ultrasound, noting correct procedure including site and side.\par MD/PA verifies presence and review of imaging studies and or written report of imaging studies.\par Agreement on the procedure to be performed\par Time out completed.\par All of the above has been confirmed by the team.\par All patient-specific concerns have been addressed. \par \par Indication: Left  lower extremity varicose veins with leg pain, leg swelling, and leg cramping.  Venous insufficiency/ reflux.\par \par Procedure: radiofrequency ablation of the left great saphenous vein. \par 	\par Mr. ROSCOE CRUZ is a 79 year old M with a history of  left lower extremity varicose veins previously seen in the office.  Ultrasound examination demonstrated venous insufficiency. A trial of compression stockings, exercise, elevation, and pain medication was attempted without relief and definitive treatment with radiofrequency ablation was offered. \par \par The patient has come for radiofrequency ablation treatment of the left great saphenous vein. \par I have discussed the risks of the procedure at length with the patient. The risks discussed were inclusive of but not limited to infection, irritation at the site of infiltration of local anesthesia, and also rare risk of deep venous thrombosis and pulmonary emboli. The patient agrees to proceed with the procedure. \par The patient was escorted into the procedure room and a time out called.\par The entire limb was prepped and draped in sterile fashion. The RF fiber was placed on the sterile field and connected by a sterile cable. Actuation, temperature, and impedance testing were performed to ensure that all components were connected and operating properly. \par The patient was placed on the procedure table and local anesthesia was instilled in the skin overlying the access site. Under ultrasound guidance, the vein was punctured with a micropuncture needle, using the anterior wall technique. A guide wire was now introduced through the needle, and the needle was then exchanged over the guide wire for a 7F sheath. The guide wire was removed and the RF probe was then placed into the left great saphenous vein through the sheath and position confirmed using ultrasound guidance. After the RF probe position was verified by ultrasound, tumescent anesthesia consisting of normal saline, 1% lidocaine with 8.4% sodium bicarbonate was infiltrated, under ultrasound guidance, precisely into the perivenous compartment along the entire length of the vein until a “halo” of fluid was noted around the vein. After RF probe position was again confirmed with ultrasound imaging, RF energy was applied. The probe was gradually and carefully withdrawn at a rate of 6.5cm/20seconds. \par \par The total volume injected was _300cc. \par Total treatment time was ____100_seconds.\par Treatment length was 26.5____ cm and 5_cycles of RF performed using the _7_ cm probe. \par The probe is 3.7__cm from the SFJ.\par \par Estimated Blood Loss: minimal\par Repeat ultrasound of the treated vein was performed confirming successful treatment. The catheter and sheath were withdrawn and hemostasis established with direct pressure. After assuring hemostasis, a sterile 4x4 was placed on the access site and an ACE compression wrap was applied. Patient tolerated procedure well. Patient was given post-procedure instructions and follow up appointment was scheduled.\par \par \par

## 2019-12-06 ENCOUNTER — APPOINTMENT (OUTPATIENT)
Dept: VASCULAR SURGERY | Facility: CLINIC | Age: 79
End: 2019-12-06
Payer: MEDICARE

## 2019-12-06 PROCEDURE — 93971 EXTREMITY STUDY: CPT

## 2019-12-17 ENCOUNTER — RX RENEWAL (OUTPATIENT)
Age: 79
End: 2019-12-17

## 2019-12-17 DIAGNOSIS — I83.893 VARICOSE VEINS OF BILATERAL LOWER EXTREMITIES WITH OTHER COMPLICATIONS: ICD-10-CM

## 2019-12-20 ENCOUNTER — RX RENEWAL (OUTPATIENT)
Age: 79
End: 2019-12-20

## 2019-12-20 ENCOUNTER — APPOINTMENT (OUTPATIENT)
Dept: VASCULAR SURGERY | Facility: CLINIC | Age: 79
End: 2019-12-20
Payer: MEDICARE

## 2019-12-20 PROCEDURE — 36475 ENDOVENOUS RF 1ST VEIN: CPT | Mod: RT

## 2019-12-20 NOTE — PROCEDURE
[FreeTextEntry1] : right GSV RFA [FreeTextEntry3] : Procedural safety checklist and time out completed:\par Confirmed patient identification (Patient Name, , and/or medical record number including when possible affirmation by patient or parent/family/other).\par Confirmed procedure with the patient. Consent present, accurate and signed. \par Confirmed special equipment and supplies are present.\par Sterility confirmed. Position verified. \par Site/ side is marked and visible and confirmed. \par Procedure confirmed by consent. Accurate consent including side and site.\par Review of medical records, including venous ultrasound, noting correct procedure including site and side.\par MD/PA verifies presence and review of imaging studies and or written report of imaging studies.\par Agreement on the procedure to be performed\par Time out completed.\par All of the above has been confirmed by the team.\par All patient-specific concerns have been addressed. \par \par Indication:  Right lower extremity varicose veins with leg pain, leg swelling, and leg cramping.  Venous insufficiency/ reflux.\par \par Procedure: radiofrequency ablation of the right great saphenous vein. \par 	\par Mr. ROSCOE CRUZ is a 79 year old M with a history of right lower extremity varicose veins previously seen in the office.  Ultrasound examination demonstrated venous insufficiency. A trial of compression stockings, exercise, elevation, and pain medication was attempted without relief and definitive treatment with radiofrequency ablation was offered. \par \par The patient has come for radiofrequency ablation treatment of the right great saphenous vein. \par I have discussed the risks of the procedure at length with the patient. The risks discussed were inclusive of but not limited to infection, irritation at the site of infiltration of local anesthesia, and also rare risk of deep venous thrombosis and pulmonary emboli. The patient agrees to proceed with the procedure. \par The patient was escorted into the procedure room and a time out called.\par The entire limb was prepped and draped in sterile fashion. The RF fiber was placed on the sterile field and connected by a sterile cable. Actuation, temperature, and impedance testing were performed to ensure that all components were connected and operating properly. \par The patient was placed on the procedure table and local anesthesia was instilled in the skin overlying the access site. Under ultrasound guidance, the vein was punctured with a micropuncture needle, using the anterior wall technique. A guide wire was now introduced through the needle, and the needle was then exchanged over the guide wire for a 7F sheath. The guide wire was removed and the RF probe was then placed into the right great saphenous vein through the sheath and position confirmed using ultrasound guidance. After the RF probe position was verified by ultrasound, tumescent anesthesia consisting of normal saline, 1% lidocaine with 8.4% sodium bicarbonate was infiltrated, under ultrasound guidance, precisely into the perivenous compartment along the entire length of the vein until a “halo” of fluid was noted around the vein. After RF probe position was again confirmed with ultrasound imaging, RF energy was applied. The probe was gradually and carefully withdrawn at a rate of 6.5cm/20seconds. \par \par The total volume injected was 50_cc. \par Total treatment time was __60___seconds.\par Treatment length was 6__ cm and _3cycles of RF performed using the _3_ cm probe. \par \par Estimated Blood Loss: minimal\par Repeat ultrasound of the treated vein was performed confirming successful treatment. The catheter and sheath were withdrawn and hemostasis established with direct pressure. After assuring hemostasis, a sterile 4x4 was placed on the access site and an ACE compression wrap was applied. Patient tolerated procedure well. Patient was given post-procedure instructions and follow up appointment was scheduled.\par \par \par

## 2019-12-23 ENCOUNTER — APPOINTMENT (OUTPATIENT)
Dept: VASCULAR SURGERY | Facility: CLINIC | Age: 79
End: 2019-12-23
Payer: MEDICARE

## 2019-12-23 PROCEDURE — 37765 STAB PHLEB VEINS XTR 10-20: CPT | Mod: LT

## 2019-12-23 PROCEDURE — 93971 EXTREMITY STUDY: CPT

## 2019-12-23 RX ORDER — ALPRAZOLAM 0.25 MG/1
0.25 TABLET ORAL
Qty: 1 | Refills: 0 | Status: COMPLETED | COMMUNITY
Start: 2019-11-25 | End: 2019-12-23

## 2019-12-23 NOTE — PROCEDURE
[FreeTextEntry1] : left stab phlebectomy [FreeTextEntry3] : Procedural safety checklist and time out completed:\par Confirmed patient identification (Patient Name, , and/or medical record number including when possible affirmation by patient or parent/family/other.\par Confirmed procedure with the patient. Consent present, accurate and signed. \par Confirmed special equipment and supplies are present.\par Sterility confirmed. Position verified. \par Site/ side is marked and visible and confirmed. \par Procedure confirmed by consent. Accurate consent including side and site.\par Review of medical records noting correct procedure including site and side.\par MD/PA verifies presence and review of imaging studies and or written report of imaging studies.\par Specify equipment are available for the planned procedure.\par MD/PA has marked the patient's procedural site and side.\par Agreement on the procedure to be performed\par Time out completed.\par All of the above has been confirmed by the team.\par All patient-specific concerns have been addressed. \par \par Indication:  Left lower extremity varicose veins with inflammation, leg pain, leg swelling, and leg cramping.  \par \par Procedure: Stab phlebectomy left  lower extremity\par \par  Mr. ROSCOE CRUZ is a 79 year old M with a history of symptomatic left lower extremity varicose veins. A trial of compression stockings, exercise, elevation, and pain medication was attempted without relief and definitive treatment with microphlebectomy was offered. \par \par I have discussed the risks of the procedure at length with the patient. The risks discussed were inclusive of but not limited to infection, irritation at the site of infiltration of local anesthesia, and also rare risk of deep venous thrombosis and pulmonary emboli. The patient agrees to proceed with the procedure. \par The patient was escorted into the procedure room, the varicose veins for treatment were marked out and the patient placed on the examination table. The entire limb was prepped and draped in sterile fashion and a  time out was called. \par \par Local anesthesia using _30_ cc 1% lidocaine was infiltrated using a 25 gauge needle over the previously marked prominent varicose vein sites.\par Multiple small stab incisions, each less than 1 cm in length was made at the noted sites. With the help of a vein hook, the vein was fished out at each of these sites, rolled over a narrow-tipped mosquito clamp and removed. Hemostasis was secured with leg elevation and application of manual pressure. After assuring hemostasis, a sterile 4x4 was placed on the access sites and an ACE compression wrap was applied. Estimated blood loss: minimal. Patient tolerated procedure well. Patient was given post-procedure instructions and follow up appointment was scheduled. \par \par SIDE -  LEFT	\par SITE - CALF / THIGH\par LOCATION - PROXIMAL / DISTAL / MEDIAL / ANTERIOR \par Total Stab Incisions ___>10_____\par \par

## 2019-12-30 ENCOUNTER — RX RENEWAL (OUTPATIENT)
Age: 79
End: 2019-12-30

## 2020-01-02 RX ORDER — SODIUM CHLORIDE 9 G/ML
0.9 INJECTION, SOLUTION INTRAVENOUS
Qty: 500 | Refills: 0 | Status: COMPLETED | COMMUNITY
Start: 2019-11-25 | End: 2020-01-02

## 2020-01-02 RX ORDER — SODIUM BICARBONATE 84 MG/ML
8.4 INJECTION, SOLUTION INTRAVENOUS
Qty: 5 | Refills: 0 | Status: COMPLETED | COMMUNITY
Start: 2019-12-17 | End: 2020-01-02

## 2020-01-02 RX ORDER — LIDOCAINE HYDROCHLORIDE 10 MG/ML
1 INJECTION, SOLUTION INFILTRATION; PERINEURAL
Qty: 50 | Refills: 0 | Status: COMPLETED | COMMUNITY
Start: 2019-11-25 | End: 2020-01-02

## 2020-01-02 RX ORDER — SODIUM CHLORIDE 9 G/ML
0.9 INJECTION, SOLUTION INTRAVENOUS
Qty: 500 | Refills: 0 | Status: COMPLETED | COMMUNITY
Start: 2019-12-17 | End: 2020-01-02

## 2020-01-02 RX ORDER — LIDOCAINE HYDROCHLORIDE 10 MG/ML
1 INJECTION, SOLUTION INFILTRATION; PERINEURAL
Qty: 50 | Refills: 0 | Status: COMPLETED | COMMUNITY
Start: 2019-12-17 | End: 2020-01-02

## 2020-01-02 RX ORDER — SODIUM BICARBONATE 84 MG/ML
8.4 INJECTION, SOLUTION INTRAVENOUS
Qty: 5 | Refills: 0 | Status: COMPLETED | COMMUNITY
Start: 2019-11-25 | End: 2020-01-02

## 2020-01-07 ENCOUNTER — APPOINTMENT (OUTPATIENT)
Dept: VASCULAR SURGERY | Facility: CLINIC | Age: 80
End: 2020-01-07
Payer: MEDICARE

## 2020-01-07 PROCEDURE — 37766 PHLEB VEINS - EXTREM 20+: CPT | Mod: RT

## 2020-01-07 NOTE — PROCEDURE
[FreeTextEntry3] : Procedural safety checklist and time out completed:\par Confirmed patient identification (Patient Name, , and/or medical record number including when possible affirmation by patient or parent/family/other.\par Confirmed procedure with the patient. Consent present, accurate and signed. \par Confirmed special equipment and supplies are present.\par Sterility confirmed. Position verified. \par Site/ side is marked and visible and confirmed. \par Procedure confirmed by consent. Accurate consent including side and site.\par Review of medical records noting correct procedure including site and side.\par MD/PA verifies presence and review of imaging studies and or written report of imaging studies.\par Specify equipment are available for the planned procedure.\par MD/PA has marked the patient's procedural site and side.\par Agreement on the procedure to be performed\par Time out completed.\par All of the above has been confirmed by the team.\par All patient-specific concerns have been addressed. \par \par Indication:  Right lower extremity varicose veins with inflammation, leg pain, leg swelling, and leg cramping.  \par \par Procedure: Stab phlebectomy right lower extremity\par \par  Mr. ROSCOE CRUZ is a 79 year old M with a history of symptomatic right lower extremity varicose veins. A trial of compression stockings, exercise, elevation, and pain medication was attempted without relief and definitive treatment with microphlebectomy was offered. \par \par I have discussed the risks of the procedure at length with the patient. The risks discussed were inclusive of but not limited to infection, irritation at the site of infiltration of local anesthesia, and also rare risk of deep venous thrombosis and pulmonary emboli. The patient agrees to proceed with the procedure. \par The patient was escorted into the procedure room, the varicose veins for treatment were marked out and the patient placed on the examination table. The entire limb was prepped and draped in sterile fashion and a  time out was called. \par \par Local anesthesia using 35__ cc 1% lidocaine was infiltrated using a 25 gauge needle over the previously marked prominent varicose vein sites.\par Multiple small stab incisions, each less than 1 cm in length was made at the noted sites. With the help of a vein hook, the vein was fished out at each of these sites, rolled over a narrow-tipped mosquito clamp and removed. Hemostasis was secured with leg elevation and application of manual pressure. After assuring hemostasis, a sterile 4x4 was placed on the access sites and an ACE compression wrap was applied. Estimated blood loss: minimal. Patient tolerated procedure well. Patient was given post-procedure instructions and follow up appointment was scheduled. \par \par SIDE - RIGHT \par SITE - CALF / THIGH\par LOCATION - PROXIMAL / DISTAL / MEDIAL / ANTERIOR \par Total Stab Incisions _____>20___\par \par  [FreeTextEntry1] : right stab phlebectomy

## 2020-01-21 ENCOUNTER — APPOINTMENT (OUTPATIENT)
Dept: VASCULAR SURGERY | Facility: CLINIC | Age: 80
End: 2020-01-21
Payer: MEDICARE

## 2020-01-21 VITALS
HEIGHT: 69 IN | HEART RATE: 71 BPM | WEIGHT: 187 LBS | DIASTOLIC BLOOD PRESSURE: 72 MMHG | TEMPERATURE: 97.6 F | BODY MASS INDEX: 27.7 KG/M2 | SYSTOLIC BLOOD PRESSURE: 130 MMHG

## 2020-01-21 DIAGNOSIS — L03.115 CELLULITIS OF RIGHT LOWER LIMB: ICD-10-CM

## 2020-01-21 PROCEDURE — 99024 POSTOP FOLLOW-UP VISIT: CPT

## 2020-01-21 NOTE — HISTORY OF PRESENT ILLNESS
[FreeTextEntry1] : 78 y/o m w/ history of venous insufficiency s/p the following procedures: \par 12/3/19 Left GSV RFA\par 12/20/19 Right GSV RFA\par 12/23/19 Left Stab Phlebectomies\par 1/7/20 Right stab Phlebectomies\par \par Pt presents today w/ c/o of right leg redness over shin incision w/ tenderness. He denies fever or chills. He noticed this approx 1 -1.5 weeks ago. He does report he is currently taking PCN since friday for a tooth infection.

## 2020-01-21 NOTE — ASSESSMENT
[Arterial/Venous Disease] : arterial/venous disease [Medication Management] : medication management [FreeTextEntry1] : 80 y/o m w/ known VI s/p multiple vein procedures now w/ s/s consistent w/ right leg mild cellulitis\par \par Medical Conservative Management\par Stop PCN. Start Keflex 500mg po BID x 1 week\par RTO in 1 week for f/u visit\par If s/s worsen or develops fever he should present to the ED. He verbalizes understanding of the above plan

## 2020-01-21 NOTE — PHYSICAL EXAM
[Skin Induration] : induration [Alert] : alert [Oriented to Person] : oriented to person [Oriented to Time] : oriented to time [Oriented to Place] : oriented to place [Calm] : calm [FreeTextEntry1] : Right lower extremity with multiple small stab incisions dry and healing well. Distal anterior shin warmth and erythema localized to surrounding 2 incisions. No streaking.  [de-identified] : well in nad  [de-identified] : cooperative

## 2020-01-28 ENCOUNTER — LABORATORY RESULT (OUTPATIENT)
Age: 80
End: 2020-01-28

## 2020-01-29 ENCOUNTER — APPOINTMENT (OUTPATIENT)
Dept: VASCULAR SURGERY | Facility: CLINIC | Age: 80
End: 2020-01-29
Payer: MEDICARE

## 2020-01-29 ENCOUNTER — NON-APPOINTMENT (OUTPATIENT)
Age: 80
End: 2020-01-29

## 2020-01-29 ENCOUNTER — APPOINTMENT (OUTPATIENT)
Dept: CARDIOLOGY | Facility: CLINIC | Age: 80
End: 2020-01-29
Payer: MEDICARE

## 2020-01-29 VITALS
DIASTOLIC BLOOD PRESSURE: 79 MMHG | TEMPERATURE: 97.4 F | BODY MASS INDEX: 27.85 KG/M2 | SYSTOLIC BLOOD PRESSURE: 144 MMHG | HEART RATE: 76 BPM | HEIGHT: 69 IN | WEIGHT: 188 LBS

## 2020-01-29 VITALS — BODY MASS INDEX: 27.99 KG/M2 | HEIGHT: 69 IN | WEIGHT: 189 LBS

## 2020-01-29 PROCEDURE — 99024 POSTOP FOLLOW-UP VISIT: CPT

## 2020-01-29 PROCEDURE — 93000 ELECTROCARDIOGRAM COMPLETE: CPT

## 2020-01-29 PROCEDURE — 99214 OFFICE O/P EST MOD 30 MIN: CPT

## 2020-01-29 NOTE — REASON FOR VISIT
[de-identified] : R leg stab [de-identified] : 1/7/20 [de-identified] : complicated by cellulitis. Pt. has been treated with PO abx and cellulitis resolved. \par On exam today., no cellulitis\par all incisions are well healed\par no swelling\par Plan\par cont elevation and compression \par f/up prn

## 2020-01-30 RX ORDER — AMOXICILLIN AND CLAVULANATE POTASSIUM 875; 125 MG/1; MG/1
875-125 TABLET, COATED ORAL
Qty: 14 | Refills: 0 | Status: COMPLETED | COMMUNITY
Start: 2020-01-22 | End: 2020-01-30

## 2020-02-12 ENCOUNTER — APPOINTMENT (OUTPATIENT)
Dept: VASCULAR SURGERY | Facility: CLINIC | Age: 80
End: 2020-02-12

## 2020-07-20 ENCOUNTER — APPOINTMENT (OUTPATIENT)
Dept: CARDIOLOGY | Facility: CLINIC | Age: 80
End: 2020-07-20
Payer: MEDICARE

## 2020-07-20 ENCOUNTER — NON-APPOINTMENT (OUTPATIENT)
Age: 80
End: 2020-07-20

## 2020-07-20 VITALS
SYSTOLIC BLOOD PRESSURE: 129 MMHG | RESPIRATION RATE: 15 BRPM | WEIGHT: 187 LBS | DIASTOLIC BLOOD PRESSURE: 82 MMHG | HEART RATE: 70 BPM | BODY MASS INDEX: 27.7 KG/M2 | HEIGHT: 69 IN

## 2020-07-20 PROCEDURE — 93922 UPR/L XTREMITY ART 2 LEVELS: CPT

## 2020-07-20 PROCEDURE — 99214 OFFICE O/P EST MOD 30 MIN: CPT

## 2020-07-20 PROCEDURE — 93000 ELECTROCARDIOGRAM COMPLETE: CPT

## 2020-07-21 RX ORDER — CEPHALEXIN 500 MG/1
500 CAPSULE ORAL
Qty: 14 | Refills: 0 | Status: COMPLETED | COMMUNITY
Start: 2020-01-21 | End: 2020-07-21

## 2020-07-21 RX ORDER — ROSUVASTATIN CALCIUM 5 MG/1
5 TABLET, FILM COATED ORAL
Qty: 36 | Refills: 1 | Status: DISCONTINUED | COMMUNITY
End: 2020-07-21

## 2020-08-06 ENCOUNTER — APPOINTMENT (OUTPATIENT)
Dept: PULMONOLOGY | Facility: CLINIC | Age: 80
End: 2020-08-06

## 2020-08-26 DIAGNOSIS — Z01.812 ENCOUNTER FOR PREPROCEDURAL LABORATORY EXAMINATION: ICD-10-CM

## 2020-09-26 LAB — SARS-COV-2 N GENE NPH QL NAA+PROBE: NOT DETECTED

## 2020-09-30 ENCOUNTER — APPOINTMENT (OUTPATIENT)
Dept: PULMONOLOGY | Facility: CLINIC | Age: 80
End: 2020-09-30
Payer: MEDICARE

## 2020-09-30 VITALS
SYSTOLIC BLOOD PRESSURE: 120 MMHG | HEART RATE: 70 BPM | DIASTOLIC BLOOD PRESSURE: 70 MMHG | HEIGHT: 69 IN | BODY MASS INDEX: 27.7 KG/M2 | WEIGHT: 187 LBS | TEMPERATURE: 97.6 F | OXYGEN SATURATION: 98 % | RESPIRATION RATE: 17 BRPM

## 2020-09-30 PROCEDURE — 94726 PLETHYSMOGRAPHY LUNG VOLUMES: CPT

## 2020-09-30 PROCEDURE — 90662 IIV NO PRSV INCREASED AG IM: CPT

## 2020-09-30 PROCEDURE — G0008: CPT

## 2020-09-30 PROCEDURE — 94010 BREATHING CAPACITY TEST: CPT

## 2020-09-30 PROCEDURE — 99214 OFFICE O/P EST MOD 30 MIN: CPT | Mod: 25

## 2020-09-30 PROCEDURE — 94729 DIFFUSING CAPACITY: CPT

## 2020-09-30 NOTE — HISTORY OF PRESENT ILLNESS
[FreeTextEntry1] : Mr. Hassan is a 80 year old male with a history of abnormal chest CT, allergic rhinitis, asthma, centrilobular emphysema, GERD, KARAN, and SOB presenting to the office today for a follow up visit. Patient does not present any chief complaints at this time. \par -has been sleeping well. \par -has been playing golf twice a week. \par -There are no visual issues. \par -reports pains from his arthritis. \par -he notes that His bowels are regular. \par -he states that the doctor cancelled his appointment for his neuropathy due to the pandemic. \par \par -He denies any headaches, nausea, vomiting, fever, chills, sweats, chest pain, chest pressure, diarrhea, constipation, dysphagia, dizziness, sour taste in the mouth, leg swelling, leg pain, itchy eyes, itchy ears, heartburn, reflux, myalgias or arthralgias.

## 2020-09-30 NOTE — ASSESSMENT
[FreeTextEntry1] : Mr. Hassan is 80 year old Male who is stable from a pulmonary perspective, exercising and asymptomatic. He has a history of asthma, COPD, abnormal CT, CAD, OSAS, RLS and neuropathy (feet). His number one issue is still neuropathy - but stable. \par \par Problem 1: lung cancer screening (stable 11/19)\par -f/u chest CT 11/2020\par -Lung Cancer Screening is recommended for people between the ages of 55 and 80 with prior 30+ pack year smoking histories. There is irrefutable evidence for realization of lung cancer screening based on two large randomized control trials demonstrating relative reduction in lung cancer mortality for patients undergoing low-dose CT scanning. Risks and benefits reviewed with the patient. \par \par Problem 2: asthma / COPD\par -Quiet off all mediations at this time\par \par -Asthma is believed to be caused by inherited (genetic) and environmental factor, but its exact cause is unknown. Asthma may be triggered by allergens, lung infections, or irritants in the air. Asthma triggers are different for each person \par -Inhaler technique reviewed as well as oral hygiene techniques reviewed with patient. Avoidance of cold air, extremes of temperature, rescue inhaler should be used before exercise. Order of medication reviewed with patient. Recommended use of a cool mist humidifier in the bedroom \par -COPD is a progressive disease and although it can’t be cured , appropriate management can slow its progression, reduce frequency and severity of exacerbations, and improve symptoms and the patient quality of life. Hospitalizations are the greatest contributor to the total COPD costs and account for up to 87% of total COPD related costs. Exacerbations are the main cause of admissions and subsequently account for the 40-75% of COPD costs. Inhaled maintenance therapy reduces the incidence of exacerbations in patients with stable COPD. Incorrect inhaler use and nonadherence are major obstacles to achieving COPD treatment goals. Many COPD patients have challenges (impaired inhalation, limited dexterity, reduced cognition: that limit their ability to correctly use their COPD treatment devices resulting in reduced symptom control. Of most importance is smoking cessation and early intervention with respiratory illnesses and contemplation for pulmonary rehab to enhance quality of life.\par \par Problem 3: GERD\par -continue Nexium 40 mg QAM\par \par -Rule of 2's- Avoid eating too much, too late, too poorly, too spicy, or two hours before bed \par -Things to avoid including overeating, spicy foods, tight clothing, eating within three hours of bed, this list is not all inclusive. \par -For treatment of reflux, possible options discussed including diet control, H2 blockers, PPIs, as well as coating motility agents discussed as treatment options. Timing of meals and proximity of last meal to sleep were discussed. If symptoms persist, a formal gastrointestinal evaluation is needed.\par \par Problem 4: Allergies/ sinus\par -continue nasal saline PRN \par -recommended OTC antihistamines\par Environmental measures for allergies were encouraged including mattress and pillow cover, air purifier, and environmental controls.\par \par Problem 5: OSAS\par -recommended to use "Chin Strap" \par -intolerable of other treatments - CPAP and dental device \par -Sleep guard recommended QD at night \par -continue to use Benadryl PRN \par -Recommended Oxy-aid\par -Discussed the risks/associations with coronary artery disease, atrial fibrillation, arrhythmia, memory loss, issues with concentration, stroke risk, hypertension, nocturia, chronic reflux/Interiano’s esophagus some but not all inclusive. Treatment options discussed including CPAP/BiPAP machine, oral appliance, ProVent therapy, Oxy-Aid by [employer]pite[employer], new technologies, or positional sleep. \par \par Problem 6: Fatigue\par -CoQ-10 200 recommended \par -continue supplemental vitamin D\par \par Problem 7: RLS\par -off medications at this time\par Restless Legs Syndrome (RLS), also known as Reyes- Ekbom Disease, is a common sleep -related movement disorder. About 1 in 10 adults in the [place] have problems from restless leg syndrome. It also can be seen in about 2% of children. Women are twice as likely as men to have RLS. People with RLS will have symptoms most often during times when they are less active, especially at bedtime. RLS most often causes an overwhelming urge to move your legs and sometimes other parts of your body. This urge is associated with unpleasant sensations in different parts of the body. The symptoms can be mild to severe and can affect your ability to go to sleep and stay asleep. People with RLS often sleep less at night and feel more tired during the day. \par \par Problem 8: Neuropathy\par -Continue Neurontin \par -recommended B complex Vitamin \par -recommended P6 cream\par -recommended L Glutamine 500 mg BID \par -recommended to use Alpha- Lipoic Acid 500 mg BID\par \par Problem 9: health maintenance\par -s/p influenza vaccine 09/2020\par -recommended strep pneumonia vaccines: Prevnar-13 vaccine, followed by Pneumo vaccine 23 on year following (completed) \par -recommended early intervention for URIs\par -recommended osteoporosis evaluations\par -recommended early dermatological evaluations\par -recommended after the age of 50 to the age of 70, colonoscopy every 5 years\par -encouraged early intervention\par \par \par F/u in 6 months - [facility] \par pt is encouraged to call or fax the office with any questions or concerns.

## 2020-09-30 NOTE — PROCEDURE
[FreeTextEntry1] : FULL PFTs reveals mild obstructive flows at the mid-low volume; FEV1 was 2.65L which is 93% of predicted; normal lung volumes; normal diffusion at 16.9, which is  97% of predicted; flattened inspiratory loop.

## 2020-09-30 NOTE — REASON FOR VISIT
[Follow-Up] : a follow-up visit [FreeTextEntry1] : abnormal chest CT, allergic rhinitis, asthma, centrilobular emphysema, GERD, AKRAN, and SOB

## 2020-09-30 NOTE — ADDENDUM
[FreeTextEntry1] : Documented by Morgan Haines acting as a scribe for Dr. Raymundo Rai on 09/30/2020 \par \par All medical record entries made by the Scribe were at my, Dr. Raymundo Rai's, direction and personally dictated by me on 09/30/2020 . I have reviewed the chart and agree that the record accurately reflects my personal performance of the history, physical exam, assessment and plan. I have also personally directed, reviewed, and agree with the discharge instructions.

## 2020-10-05 ENCOUNTER — APPOINTMENT (OUTPATIENT)
Dept: CARDIOLOGY | Facility: CLINIC | Age: 80
End: 2020-10-05
Payer: MEDICARE

## 2020-10-05 VITALS
BODY MASS INDEX: 27.55 KG/M2 | SYSTOLIC BLOOD PRESSURE: 118 MMHG | RESPIRATION RATE: 16 BRPM | DIASTOLIC BLOOD PRESSURE: 78 MMHG | HEIGHT: 69 IN | WEIGHT: 186 LBS | HEART RATE: 68 BPM

## 2020-10-05 DIAGNOSIS — Z86.79 PERSONAL HISTORY OF OTHER DISEASES OF THE CIRCULATORY SYSTEM: ICD-10-CM

## 2020-10-05 DIAGNOSIS — R09.89 OTHER SPECIFIED SYMPTOMS AND SIGNS INVOLVING THE CIRCULATORY AND RESPIRATORY SYSTEMS: ICD-10-CM

## 2020-10-05 PROCEDURE — 99213 OFFICE O/P EST LOW 20 MIN: CPT

## 2020-10-05 PROCEDURE — 93880 EXTRACRANIAL BILAT STUDY: CPT

## 2020-10-05 PROCEDURE — 93978 VASCULAR STUDY: CPT

## 2020-10-05 PROCEDURE — 93306 TTE W/DOPPLER COMPLETE: CPT

## 2020-10-05 RX ORDER — EZETIMIBE 10 MG/1
10 TABLET ORAL DAILY
Qty: 90 | Refills: 1 | Status: DISCONTINUED | COMMUNITY
End: 2020-10-05

## 2020-10-05 RX ORDER — ALPRAZOLAM 0.25 MG/1
0.25 TABLET ORAL
Qty: 1 | Refills: 0 | Status: DISCONTINUED | COMMUNITY
Start: 2019-12-30 | End: 2020-10-05

## 2020-10-26 ENCOUNTER — RX RENEWAL (OUTPATIENT)
Age: 80
End: 2020-10-26

## 2020-11-12 PROBLEM — R09.89 ABDOMINAL BRUIT: Status: ACTIVE | Noted: 2020-11-12

## 2020-11-19 ENCOUNTER — RESULT REVIEW (OUTPATIENT)
Age: 80
End: 2020-11-19

## 2020-11-19 ENCOUNTER — APPOINTMENT (OUTPATIENT)
Dept: CT IMAGING | Facility: CLINIC | Age: 80
End: 2020-11-19
Payer: MEDICARE

## 2020-11-19 ENCOUNTER — OUTPATIENT (OUTPATIENT)
Dept: OUTPATIENT SERVICES | Facility: HOSPITAL | Age: 80
LOS: 1 days | End: 2020-11-19
Payer: MEDICARE

## 2020-11-19 DIAGNOSIS — R93.89 ABNORMAL FINDINGS ON DIAGNOSTIC IMAGING OF OTHER SPECIFIED BODY STRUCTURES: ICD-10-CM

## 2020-11-19 DIAGNOSIS — Z98.61 CORONARY ANGIOPLASTY STATUS: Chronic | ICD-10-CM

## 2020-11-19 DIAGNOSIS — Z98.890 OTHER SPECIFIED POSTPROCEDURAL STATES: Chronic | ICD-10-CM

## 2020-11-19 PROCEDURE — 71250 CT THORAX DX C-: CPT

## 2020-11-19 PROCEDURE — 71250 CT THORAX DX C-: CPT | Mod: 26

## 2020-11-27 ENCOUNTER — NON-APPOINTMENT (OUTPATIENT)
Age: 80
End: 2020-11-27

## 2021-01-05 ENCOUNTER — APPOINTMENT (OUTPATIENT)
Dept: CARDIOLOGY | Facility: CLINIC | Age: 81
End: 2021-01-05
Payer: MEDICARE

## 2021-01-05 ENCOUNTER — NON-APPOINTMENT (OUTPATIENT)
Age: 81
End: 2021-01-05

## 2021-01-05 VITALS — HEIGHT: 69 IN | WEIGHT: 193 LBS | BODY MASS INDEX: 28.58 KG/M2

## 2021-01-05 PROCEDURE — 99213 OFFICE O/P EST LOW 20 MIN: CPT

## 2021-01-05 PROCEDURE — 93000 ELECTROCARDIOGRAM COMPLETE: CPT

## 2021-03-17 ENCOUNTER — APPOINTMENT (OUTPATIENT)
Dept: PULMONOLOGY | Facility: CLINIC | Age: 81
End: 2021-03-17
Payer: MEDICARE

## 2021-03-17 VITALS
DIASTOLIC BLOOD PRESSURE: 70 MMHG | HEART RATE: 79 BPM | BODY MASS INDEX: 28.14 KG/M2 | RESPIRATION RATE: 17 BRPM | TEMPERATURE: 97.6 F | SYSTOLIC BLOOD PRESSURE: 110 MMHG | WEIGHT: 190 LBS | OXYGEN SATURATION: 98 % | HEIGHT: 69 IN

## 2021-03-17 PROCEDURE — 99214 OFFICE O/P EST MOD 30 MIN: CPT

## 2021-03-17 NOTE — ADDENDUM
[FreeTextEntry1] : Documented by Celio Figueroa acting as a scribe for Dr. Raymundo Rai on 03/17/2021.\par \par All medical record entries made by the Scribe were at my, Dr. Raymundo Rai's, direction and personally dictated by me on 03/17/2021 . I have reviewed the chart and agree that the record accurately reflects my personal performance of the history, physical exam, assessment and plan. I have also personally directed, reviewed, and agree with the discharge instructions. \par

## 2021-03-17 NOTE — HISTORY OF PRESENT ILLNESS
[FreeTextEntry1] : Mr. Hassan is a 80 year old male with a history of abnormal chest CT, allergic rhinitis, asthma, centrilobular emphysema, GERD, KARAN, and SOB presenting to the office today for a follow up visit. \par \par -he notes generally feeling well\par -he notes bumps, rash on upper extremities bilaterally onset 2-3 weeks ago that has radiated to total body\par -he notes his memory and concentration are decreased from baseline\par -he denies ankle swelling\par -he denies palpitations\par -he notes regular daily exercise walking, stretching, golf\par -he notes sleep quality poor\par -he notes sleeps 5-6 hours a night on average\par -he denies snore\par -he denies current sleep therapy\par -he denies leaky, drippy sinuses\par -he notes neuropathy stable\par -he denies wheeze\par -he denies cough\par \par \par -denies any chest pain, chest pressure, diarrhea, constipation, dysphagia, sour taste in the mouth, dizziness, leg swelling, leg pain, myalgias, arthralgias, itchy eyes, itchy ears, heartburn, or reflux.\par \par

## 2021-03-17 NOTE — ASSESSMENT
[FreeTextEntry1] : Mr. Hassan is 80 year old Male who is stable from a pulmonary perspective, exercising and asymptomatic. He has a history of asthma, COPD, abnormal CT, CAD, OSAS, RLS and neuropathy (feet). His number one issue is still neuropathy - #2 is rash. \par \par Problem 1: lung cancer screening (stable 11/20)\par -f/u chest CT (last 11/2020, next 11/2021) \par -Lung Cancer Screening is recommended for people between the ages of 55 and 80 with prior 30+ pack year smoking histories. There is irrefutable evidence for realization of lung cancer screening based on two large randomized control trials demonstrating relative reduction in lung cancer mortality for patients undergoing low-dose CT scanning. Risks and benefits reviewed with the patient. \par \par Problem 2: asthma / COPD\par -Quiet off all mediations at this time\par \par -Asthma is believed to be caused by inherited (genetic) and environmental factor, but its exact cause is unknown. Asthma may be triggered by allergens, lung infections, or irritants in the air. Asthma triggers are different for each person \par -Inhaler technique reviewed as well as oral hygiene techniques reviewed with patient. Avoidance of cold air, extremes of temperature, rescue inhaler should be used before exercise. Order of medication reviewed with patient. Recommended use of a cool mist humidifier in the bedroom \par -COPD is a progressive disease and although it can’t be cured , appropriate management can slow its progression, reduce frequency and severity of exacerbations, and improve symptoms and the patient quality of life. Hospitalizations are the greatest contributor to the total COPD costs and account for up to 87% of total COPD related costs. Exacerbations are the main cause of admissions and subsequently account for the 40-75% of COPD costs. Inhaled maintenance therapy reduces the incidence of exacerbations in patients with stable COPD. Incorrect inhaler use and nonadherence are major obstacles to achieving COPD treatment goals. Many COPD patients have challenges (impaired inhalation, limited dexterity, reduced cognition: that limit their ability to correctly use their COPD treatment devices resulting in reduced symptom control. Of most importance is smoking cessation and early intervention with respiratory illnesses and contemplation for pulmonary rehab to enhance quality of life.\par \par Problem 2A: Rash\par -complete dermatology evaluation\par -recommended Borage and Flax Seed Oil \par \par Problem 3: GERD\par -continue Nexium 40 mg QAM\par \par -Rule of 2's- Avoid eating too much, too late, too poorly, too spicy, or two hours before bed \par -Things to avoid including overeating, spicy foods, tight clothing, eating within three hours of bed, this list is not all inclusive. \par -For treatment of reflux, possible options discussed including diet control, H2 blockers, PPIs, as well as coating motility agents discussed as treatment options. Timing of meals and proximity of last meal to sleep were discussed. If symptoms persist, a formal gastrointestinal evaluation is needed.\par \par Problem 4: Allergies/ sinus\par -continue nasal saline PRN \par -recommended OTC antihistamines\par Environmental measures for allergies were encouraged including mattress and pillow cover, air purifier, and environmental controls.\par \par Problem 5: OSAS\par -recommended to use "Chin Strap" \par -intolerable of other treatments - CPAP and dental device \par -Sleep guard recommended QD at night \par -continue to use Benadryl PRN \par -Recommended Oxy-aid\par -Discussed the risks/associations with coronary artery disease, atrial fibrillation, arrhythmia, memory loss, issues with concentration, stroke risk, hypertension, nocturia, chronic reflux/Interiano’s esophagus some but not all inclusive. Treatment options discussed including CPAP/BiPAP machine, oral appliance, ProVent therapy, Oxy-Aid by [employer], new technologies, or positional sleep. \par \par Problem 6: Fatigue\par -CoQ-10 200 recommended \par -continue supplemental vitamin D\par \par Problem 7: RLS\par -off medications at this time\par Restless Legs Syndrome (RLS), also known as Reyes- Ekbom Disease, is a common sleep -related movement disorder. About 1 in 10 adults in the [place] have problems from restless leg syndrome. It also can be seen in about 2% of children. Women are twice as likely as men to have RLS. People with RLS will have symptoms most often during times when they are less active, especially at bedtime. RLS most often causes an overwhelming urge to move your legs and sometimes other parts of your body. This urge is associated with unpleasant sensations in different parts of the body. The symptoms can be mild to severe and can affect your ability to go to sleep and stay asleep. People with RLS often sleep less at night and feel more tired during the day. \par \par Problem 8: Neuropathy\par -Continue Neurontin \par -recommended Neuro Renew\par -recommended B complex Vitamin \par -recommended P6 cream\par -recommended L Glutamine 500 mg BID \par -recommended to use Alpha- Lipoic Acid 500 mg BID\par \par Problem 9: health maintenance\par -s/p COVID Vaccine Moderna x 2\par -s/p influenza vaccine 09/2020\par -recommended strep pneumonia vaccines: Prevnar-13 vaccine, followed by Pneumo vaccine 23 on year following (completed) \par -recommended early intervention for URIs\par -recommended osteoporosis evaluations\par -recommended early dermatological evaluations\par -recommended after the age of 50 to the age of 70, colonoscopy every 5 years\par -encouraged early intervention\par \par \par F/u in 6 months - [facility] \par pt is encouraged to call or fax the office with any questions or concerns.

## 2021-03-17 NOTE — PROCEDURE
[FreeTextEntry1] : CT Chest (11.19.2020) reveals no change.\par \par -Images and procedures reviewed in detail and discussed with patient.

## 2021-03-17 NOTE — PHYSICAL EXAM

## 2021-05-18 ENCOUNTER — LABORATORY RESULT (OUTPATIENT)
Age: 81
End: 2021-05-18

## 2021-05-18 ENCOUNTER — APPOINTMENT (OUTPATIENT)
Dept: CARDIOLOGY | Facility: CLINIC | Age: 81
End: 2021-05-18
Payer: MEDICARE

## 2021-05-18 ENCOUNTER — NON-APPOINTMENT (OUTPATIENT)
Age: 81
End: 2021-05-18

## 2021-05-18 VITALS
OXYGEN SATURATION: 96 % | SYSTOLIC BLOOD PRESSURE: 138 MMHG | RESPIRATION RATE: 17 BRPM | BODY MASS INDEX: 28.14 KG/M2 | HEART RATE: 62 BPM | HEIGHT: 69 IN | WEIGHT: 190 LBS | DIASTOLIC BLOOD PRESSURE: 84 MMHG | TEMPERATURE: 97.9 F

## 2021-05-18 PROCEDURE — 99214 OFFICE O/P EST MOD 30 MIN: CPT

## 2021-05-18 PROCEDURE — 93000 ELECTROCARDIOGRAM COMPLETE: CPT

## 2021-05-18 NOTE — DISCUSSION/SUMMARY
[FreeTextEntry1] : This is a 81-year-old male with past medical history significant for coronary artery disease, status post stent to right coronary artery, hypertension, hyperlipidemia, statin intolerance, mitral valve regurgitation, who comes in for followup cardiac evaluation.\par The patient's blood pressure is elevated today and he will start on Micardis 40 mg daily.  He will follow-up in 6 to 8 weeks to recheck his blood pressure.  The patient is tolerating his PCSK9 injectable therapy with Praluent 75 mg every 2 weeks.  He is not taking Zetia at this time.  He will have new blood work done today for lipid panel and SMA-20.\par He is instructed to limit his salt intake and continue his daily aerobic activity.\par Electrocardiogram done May 18, 2021 demonstrate normal sinus rhythm rate 62 bpm and is otherwise unremarkable.\par PMH:\par The patient reports that the Zetia was making him feel groggy.  I have explained to him that I feel his gabapentin 3 times a day is more likely causing that side effect.\par His LDL cholesterol needs to be less than 70 mg/dL.\par The patient also complains of bilateral cramping in his calf muscles when walking.  He is no longer on Crestor 5 mg/day.  However given his risk profile, the patient will have an ankle-brachial index done today.\par .He had recent venous ablation done January 7, 2020 by vascular surgery.  This was complicated by localized infection.  He will followup with his vascular surgeon.\par Lifestyle modification was reinforced.\par He remained statin intolerant. \par \par The patient was on Praluent because of statin intolerance.\par \par PMH:\par He had a cardiac catheterization done November 10, 2011 which revealed a 40% lesion in the left anterior descending artery, luminal irregularities in the distal left main artery and circumflex artery, 20% tubular stenosis at the site of the prior stent. Medical therapy was recommended at that time.\par He had a bare-metal stent placed in the proximal mid right coronary artery in February 2002.\par \par The patient understands that aerobic exercises must be increased to 40 minutes 4 times per week. A detailed discussion of lifestyle modification was done today. The patient has a good understanding of the diagnosis, and treatment plan. Lifestyle modification was also outlined.

## 2021-05-18 NOTE — PHYSICAL EXAM
[Normal Appearance] : normal appearance [General Appearance - Well Developed] : well developed [Well Groomed] : well groomed [General Appearance - Well Nourished] : well nourished [No Deformities] : no deformities [General Appearance - In No Acute Distress] : no acute distress [Normal Conjunctiva] : the conjunctiva exhibited no abnormalities [Normal Oral Mucosa] : normal oral mucosa [No Oral Pallor] : no oral pallor [Normal Oropharynx] : normal oropharynx [Normal Jugular Venous A Waves Present] : normal jugular venous A waves present [Normal Jugular Venous V Waves Present] : normal jugular venous V waves present [No Jugular Venous Jewell A Waves] : no jugular venous jewell A waves [Respiration, Rhythm And Depth] : normal respiratory rhythm and effort [Exaggerated Use Of Accessory Muscles For Inspiration] : no accessory muscle use [Auscultation Breath Sounds / Voice Sounds] : lungs were clear to auscultation bilaterally [Bowel Sounds] : normal bowel sounds [Abdomen Soft] : soft [Abdomen Tenderness] : non-tender [Abnormal Walk] : normal gait [Nail Clubbing] : no clubbing of the fingernails [Cyanosis, Localized] : no localized cyanosis [Petechial Hemorrhages (___cm)] : no petechial hemorrhages [Skin Color & Pigmentation] : normal skin color and pigmentation [Skin Turgor] : normal skin turgor [] : no rash [No Venous Stasis] : no venous stasis [Oriented To Time, Place, And Person] : oriented to person, place, and time [Impaired Insight] : insight and judgment were intact [Affect] : the affect was normal [No Anxiety] : not feeling anxious [5th Left ICS - MCL] : palpated at the 5th LICS in the midclavicular line [Normal] : normal [No Precordial Heave] : no precordial heave was noted [Normal Rate] : normal [Rhythm Regular] : regular [Normal S1] : normal S1 [Normal S2] : normal S2 [No Gallop] : no gallop heard [No Murmur] : no murmurs heard [II] : a grade 2 [I] : a grade 1 [2+] : left 2+ [No Abnormalities] : the abdominal aorta was not enlarged and no bruit was heard [No Pitting Edema] : no pitting edema present [Rt] : varicose veins of the right leg noted [Lt] : varicose veins of the left leg noted [Apical Thrill] : no thrill palpable at the apex [S3] : no S3 [S4] : no S4 [Click] : no click [Pericardial Rub] : no pericardial rub [Right Carotid Bruit] : no bruit heard over the right carotid [Left Carotid Bruit] : no bruit heard over the left carotid [Right Femoral Bruit] : no bruit heard over the right femoral artery [Left Femoral Bruit] : no bruit heard over the left femoral artery

## 2021-05-18 NOTE — ASSESSMENT
[FreeTextEntry1] :  80-year-old male with a past medical history significant for coronary artery disease, s/p  stent to right coronary artery, hypertension, hyperlipidemia, statin intolerance, mitral valve regurgitation, comes in for followup cardiac evaluation. He is s/p b/l venous ablation on January 7, 2020 with Dr. Brooks. \par \par He is feeling generally well today and denies chest pain, dizziness, heart palpitations, recent episodes of syncope or falls, SOB, or dyspnea at this time. \par \par -Pt is stable from a cardiac standpoint and does not have any complaints at this time. \par -Cardiac risk factors include HTN, HLD.\par -BP is well controlled in today's visit.\par -New blood work was done 10/5/2020 to evaluate lipid profile, CBC, BMP, hepatic function, A1C and TSH. Pt currently taking Praluent 75mg. His last injection was last week. Cholesterol 152, HDL 50 and LDL 67. He states that he was on zetia previousy but stopped because he thought it was giving him aches and pains. I explained in his prior visit that this is unlikely the cause. He does not complain of any pains on today's exam.\par -We will do new blood work during his follow up appointment in 3 months.\par \par -EKG done Jan 5th 2021 which demonstrated regular sinus rhythm with nonspecific ST-T wave changes, BPM of 75.\par -Echocardiogram done on 10/5/2020 demonstrated mitral annular  calcification with moderate mitral regurgitation, thickened aortic valve, mild atrial enlargement, moderate tricuspid regurgitation and mild pulmonic regurgitation with normal left ventricular systolic function.\par \par I have discussed the plan of care with Mr. ROSCOE CRUZ  and he  will follow up in 3 months. He is compliant with all of his  medications.\par \par The patient understands that aerobic exercises must be increased to minutes 4 times/week and a detailed discussion of lifestyle modification was done today. \par The patient has a good understanding of the diagnosis, treatment plan and lifestyle modification. \par He will contact me at the office for any questions with their care or any changes in their health status.\par \par The plan of care was discussed with supervising physician, Dr. Rea while present in the office at the time of the visit. \par \par Adelita ORR

## 2021-05-27 ENCOUNTER — EMERGENCY (EMERGENCY)
Facility: HOSPITAL | Age: 81
LOS: 1 days | Discharge: HOME | End: 2021-05-27
Attending: EMERGENCY MEDICINE | Admitting: EMERGENCY MEDICINE
Payer: MEDICARE

## 2021-05-27 VITALS
RESPIRATION RATE: 16 BRPM | TEMPERATURE: 98 F | DIASTOLIC BLOOD PRESSURE: 83 MMHG | SYSTOLIC BLOOD PRESSURE: 146 MMHG | OXYGEN SATURATION: 98 % | HEART RATE: 75 BPM

## 2021-05-27 VITALS
WEIGHT: 198.42 LBS | HEIGHT: 69 IN | RESPIRATION RATE: 16 BRPM | OXYGEN SATURATION: 97 % | HEART RATE: 82 BPM | DIASTOLIC BLOOD PRESSURE: 92 MMHG | SYSTOLIC BLOOD PRESSURE: 169 MMHG | TEMPERATURE: 98 F

## 2021-05-27 DIAGNOSIS — Z98.61 CORONARY ANGIOPLASTY STATUS: Chronic | ICD-10-CM

## 2021-05-27 DIAGNOSIS — Z98.890 OTHER SPECIFIED POSTPROCEDURAL STATES: Chronic | ICD-10-CM

## 2021-05-27 PROCEDURE — 51703 INSERT BLADDER CATH COMPLEX: CPT

## 2021-05-27 PROCEDURE — 99283 EMERGENCY DEPT VISIT LOW MDM: CPT | Mod: 25

## 2021-05-27 PROCEDURE — 99283 EMERGENCY DEPT VISIT LOW MDM: CPT

## 2021-05-27 NOTE — ED PROVIDER NOTE - PROGRESS NOTE DETAILS
reno inserted by RN, approx 250cc urine output, will d/c on leg bag, clear urine, f/u with Dr. Craig

## 2021-05-27 NOTE — ED PROVIDER NOTE - OBJECTIVE STATEMENT
80 yo male hx of bph, cad, recently started to self catheterize last week c/o urinary retention since 11am, had difficulty self catheterizing tonight, now here for evaluation.  No n/v.  No abdominal pain.  Purposely has been drinking less water since he was unable to self catheterize.    uro dr freire

## 2021-05-27 NOTE — ED PROVIDER NOTE - CARE PROVIDER_API CALL
Moe Craig)  Urology  [address] [facility], [address]  [place] [address]  Phone: [phone]  Fax: [phone]  Follow Up Time:

## 2021-05-27 NOTE — ED ADULT NURSE NOTE - OBJECTIVE STATEMENT
Patient came in to ED c/o urinary retention today. Patient states he self catheterized at home, had @ 11AM successfully but this afternoon was unable to self catheterized that prompted to come to ED. Complained of slight pressure in the suprapubic area. Denies fever/ chills.

## 2021-05-27 NOTE — ED PROVIDER NOTE - PHYSICAL EXAMINATION
Gen: Alert, NAD  Head/eyes: NC/AT, PERRL  ENT: airway patent  Neck: supple, no tenderness/meningismus/JVD, Trachea midline  Pulm/lung: Bilateral clear BS, normal resp effort, no wheeze/stridor/retractions  CV/heart: RRR, no M/R/G, +2 dist pulses (radial, pedal DP/PT, popliteal)  GI/Abd: soft, +suprapubic fullness/ND, +BS, no guarding/rebound tenderness  Musculoskeletal: no edema/erythema/cyanosis, FROM in all extremities, no C/T/L spine ttp  Skin: no rash, no vesicles, no petechaie, no ecchymosis, no swelling  Neuro: AAOx3, CN 2-12 intact, normal sensation, 5/5 motor strength in all extremities, normal gait

## 2021-05-27 NOTE — ED ADULT NURSE NOTE - PMH
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

## 2021-05-27 NOTE — ED PROVIDER NOTE - PATIENT PORTAL LINK FT
You can access the FollowMyHealth Patient Portal offered by [facility] by registering at the following website: [email or web address]. By joining [facility]’s FollowMyHealth portal, you will also be able to view your health information using other applications (apps) compatible with our system.

## 2021-05-27 NOTE — ED PROVIDER NOTE - NSFOLLOWUPINSTRUCTIONS_ED_ALL_ED_FT
1) Follow-up with Dr. Craig. Call today / next business day for prompt follow-up.  2) Return to Emergency room for any worsening or persistent pain, weakness, fever, or any other concerning symptoms.  3) See attached instruction sheets for additional information, including information regarding signs and symptoms to look out for, reasons to seek immediate care and other important instructions.  4) Follow-up with any specialists as discussed / noted as well.       WHAT YOU NEED TO KNOW:    A Recinos catheter is a sterile tube that is inserted into your bladder to drain urine. It is also called an indwelling urinary catheter.     DISCHARGE INSTRUCTIONS:    Return to the emergency department if:   •Your catheter comes out.       •You suddenly have material that looks like sand in the tubing or drainage bag.      •No urine is draining into the bag and you have checked the system.      •You have pain in your hip, back, pelvis, or lower abdomen.      •You are confused or cannot think clearly.      Call your doctor or urologist if:   •You have a fever.      •You have bladder spasms for more than 1 day after the catheter is placed.      •You see blood in the tubing or drainage bag.      •You have a rash or itching where the catheter tube is secured to your skin.      •Urine leaks from or around the catheter, tubing, or drainage bag.      •The closed drainage system has accidently come open or apart.       •You see a layer of crystals inside the tubing.      •You have questions or concerns about your condition or care.      Care for your catheter and drainage bag: You can reduce your risk for infection and injury by caring for your catheter and drainage bag properly.  •Wash your hands often. Wash before and after you touch your catheter, tubing, or drainage bag. Use soap and water. Wear clean disposable gloves when you care for your catheter or disconnect the drainage bag. Wash your hands before you prepare or eat food.   Handwashing           •Clean your genital area 2 times every day. Clean your catheter area and anal opening after every bowel movement. ?For men: Use a soapy cloth to clean the tip of your penis. Start where the catheter enters. Wipe backward making sure to pull back the foreskin. Then use a cloth with clear water in the same direction to clean away the soap.       ?For women: Use a soapy cloth to clean the area that the catheter enters your body. Make sure to separate your labia and wipe toward the anus. Then use a cloth with clear water and wipe in the same direction.       •Secure the catheter tube so you do not pull or move the catheter. This helps prevent pain and bladder spasms. Healthcare providers will show you how to use medical tape or a strap to secure the catheter tube to your body.       •Keep a closed drainage system. Your catheter should always be attached to the drainage bag to form a closed system. Do not disconnect any part of the closed system unless you need to change the bag.      •Keep the drainage bag below the level of your waist. This helps stop urine from moving back up the tubing and into your bladder. Do not loop or kink the tubing. This can cause urine to back up and collect in your bladder. Do not let the drainage bag touch or lie on the floor.      •Empty the drainage bag when needed. The weight of a full drainage bag can be painful. Empty the drainage bag every 3 to 6 hours or when it is ? full.       •Clean and change the drainage bag as directed. Ask your healthcare provider how often you should change the drainage bag and what cleaning solution to use. Wear disposable gloves when you change the bag. Do not allow the end of the catheter or tubing to touch anything. Clean the ends with an alcohol pad before you reconnect them.      What to do if problems develop:   •No urine is draining into the bag: ?Check for kinks in the tubing and straighten them out.       ?Check the tape or strap used to secure the catheter tube to your skin. Make sure it is not blocking the tube.       ?Make sure you are not sitting or lying on the tubing.      ?Make sure the urine bag is hanging below the level of your waist.      •Urine leaks from or around the catheter, tubing, or drainage bag: Check if the closed drainage system has accidently come open or apart. Clean the catheter and tubing ends with a new alcohol pad and reconnect them.       Follow up with your doctor or urologist as directed: Write down your questions so you remember to ask them during your visits.

## 2021-07-20 ENCOUNTER — LABORATORY RESULT (OUTPATIENT)
Age: 81
End: 2021-07-20

## 2021-07-20 ENCOUNTER — APPOINTMENT (OUTPATIENT)
Dept: CARDIOLOGY | Facility: CLINIC | Age: 81
End: 2021-07-20
Payer: MEDICARE

## 2021-07-20 VITALS
SYSTOLIC BLOOD PRESSURE: 127 MMHG | WEIGHT: 190 LBS | DIASTOLIC BLOOD PRESSURE: 80 MMHG | HEART RATE: 68 BPM | HEIGHT: 69 IN | OXYGEN SATURATION: 99 % | TEMPERATURE: 97.9 F | RESPIRATION RATE: 15 BRPM | BODY MASS INDEX: 28.14 KG/M2

## 2021-07-20 PROCEDURE — 99214 OFFICE O/P EST MOD 30 MIN: CPT

## 2021-07-20 NOTE — REASON FOR VISIT
[CV Risk Factors and Non-Cardiac Disease] : CV risk factors and non-cardiac disease [Follow-Up - Clinic] : a clinic follow-up of [Coronary Artery Disease] : coronary artery disease [Hyperlipidemia] : hyperlipidemia [Hypertension] : hypertension [Mitral Regurgitation] : mitral regurgitation [Palpitations] : palpitations [FreeTextEntry1] : s/p stent CAD, s/p b/l venous ablation

## 2021-07-20 NOTE — PHYSICAL EXAM
[General Appearance - Well Developed] : well developed [Normal Appearance] : normal appearance [Well Groomed] : well groomed [General Appearance - Well Nourished] : well nourished [No Deformities] : no deformities [General Appearance - In No Acute Distress] : no acute distress [Normal Conjunctiva] : the conjunctiva exhibited no abnormalities [Normal Oral Mucosa] : normal oral mucosa [No Oral Pallor] : no oral pallor [Normal Oropharynx] : normal oropharynx [Normal Jugular Venous A Waves Present] : normal jugular venous A waves present [Normal Jugular Venous V Waves Present] : normal jugular venous V waves present [No Jugular Venous Jewell A Waves] : no jugular venous jewell A waves [Respiration, Rhythm And Depth] : normal respiratory rhythm and effort [Exaggerated Use Of Accessory Muscles For Inspiration] : no accessory muscle use [Auscultation Breath Sounds / Voice Sounds] : lungs were clear to auscultation bilaterally [Bowel Sounds] : normal bowel sounds [Abdomen Soft] : soft [Abdomen Tenderness] : non-tender [Abnormal Walk] : normal gait [Nail Clubbing] : no clubbing of the fingernails [Cyanosis, Localized] : no localized cyanosis [Petechial Hemorrhages (___cm)] : no petechial hemorrhages [Skin Color & Pigmentation] : normal skin color and pigmentation [Skin Turgor] : normal skin turgor [] : no rash [No Venous Stasis] : no venous stasis [Oriented To Time, Place, And Person] : oriented to person, place, and time [Impaired Insight] : insight and judgment were intact [Affect] : the affect was normal [No Anxiety] : not feeling anxious [5th Left ICS - MCL] : palpated at the 5th LICS in the midclavicular line [Normal] : normal [No Precordial Heave] : no precordial heave was noted [Apical Thrill] : no thrill palpable at the apex [Normal Rate] : normal [Rhythm Regular] : regular [Normal S1] : normal S1 [Normal S2] : normal S2 [No Gallop] : no gallop heard [S3] : no S3 [S4] : no S4 [Click] : no click [Pericardial Rub] : no pericardial rub [No Murmur] : no murmurs heard [II] : a grade 2 [I] : a grade 1 [Right Carotid Bruit] : no bruit heard over the right carotid [Left Carotid Bruit] : no bruit heard over the left carotid [Right Femoral Bruit] : no bruit heard over the right femoral artery [Left Femoral Bruit] : no bruit heard over the left femoral artery [2+] : left 2+ [No Abnormalities] : the abdominal aorta was not enlarged and no bruit was heard [No Pitting Edema] : no pitting edema present [Rt] : varicose veins of the right leg noted [Lt] : varicose veins of the left leg noted

## 2021-07-20 NOTE — DISCUSSION/SUMMARY
[FreeTextEntry1] : This is a 81-year-old male with past medical history significant for coronary artery disease, status post stent to right coronary artery, hypertension, hyperlipidemia, statin intolerance, mitral valve regurgitation, who comes in for followup cardiac evaluation.\par He is tolerating Micardis 40 mg daily with better blood pressure.  He does complain of occasional fatigue and I have asked him to switch his Micardis to after dinner.  I feel that his fatigue is most likely secondary to his gabapentin.\par The patient is tolerating his PCSK9 injectable therapy with Praluent 75 mg every 2 weeks.  He is not taking Zetia at this time.\par He is instructed to limit his salt intake and continue his daily aerobic activity.\par Electrocardiogram done May 18, 2021 demonstrate normal sinus rhythm rate 62 bpm and is otherwise unremarkable.\par PMH:\par The patient reports that the Zetia was making him feel groggy.  I have explained to him that I feel his gabapentin 3 times a day is more likely causing that side effect.\par His LDL cholesterol needs to be less than 70 mg/dL.\par The patient also complains of bilateral cramping in his calf muscles when walking.  He is no longer on Crestor 5 mg/day.  However given his risk profile, the patient will have an ankle-brachial index done today.\par .He had recent venous ablation done January 7, 2020 by vascular surgery.  This was complicated by localized infection.  He will followup with his vascular surgeon.\par Lifestyle modification was reinforced.\par The patient was on Praluent because of statin intolerance.\par \par PMH:\par He had a cardiac catheterization done November 10, 2011 which revealed a 40% lesion in the left anterior descending artery, luminal irregularities in the distal left main artery and circumflex artery, 20% tubular stenosis at the site of the prior stent. Medical therapy was recommended at that time.\par He had a bare-metal stent placed in the proximal mid right coronary artery in February 2002.\par \par The patient understands that aerobic exercises must be increased to 40 minutes 4 times per week. A detailed discussion of lifestyle modification was done today. The patient has a good understanding of the diagnosis, and treatment plan. Lifestyle modification was also outlined.

## 2021-07-20 NOTE — ASSESSMENT
[FreeTextEntry1] :  Prior note nurse practitioner Enrique January 5, 2021:\par \par 80-year-old male with a past medical history significant for coronary artery disease, s/p  stent to right coronary artery, hypertension, hyperlipidemia, statin intolerance, mitral valve regurgitation, comes in for followup cardiac evaluation. He is s/p b/l venous ablation on January 7, 2020 with Dr. Brooks. \par \par He is feeling generally well today and denies chest pain, dizziness, heart palpitations, recent episodes of syncope or falls, SOB, or dyspnea at this time. \par \par -Pt is stable from a cardiac standpoint and does not have any complaints at this time. \par -Cardiac risk factors include HTN, HLD.\par -BP is well controlled in today's visit.\par -New blood work was done 10/5/2020 to evaluate lipid profile, CBC, BMP, hepatic function, A1C and TSH. Pt currently taking Praluent 75mg. His last injection was last week. Cholesterol 152, HDL 50 and LDL 67. He states that he was on zetia previousy but stopped because he thought it was giving him aches and pains. I explained in his prior visit that this is unlikely the cause. He does not complain of any pains on today's exam.\par -We will do new blood work during his follow up appointment in 3 months.\par \par -EKG done Jan 5th 2021 which demonstrated regular sinus rhythm with nonspecific ST-T wave changes, BPM of 75.\par -Echocardiogram done on 10/5/2020 demonstrated mitral annular  calcification with moderate mitral regurgitation, thickened aortic valve, mild atrial enlargement, moderate tricuspid regurgitation and mild pulmonic regurgitation with normal left ventricular systolic function.\par \par I have discussed the plan of care with Mr. ROSCOE CRUZ  and he  will follow up in 3 months. He is compliant with all of his  medications.\par \par The patient understands that aerobic exercises must be increased to minutes 4 times/week and a detailed discussion of lifestyle modification was done today. \par The patient has a good understanding of the diagnosis, treatment plan and lifestyle modification. \par He will contact me at the office for any questions with their care or any changes in their health status.\par \par The plan of care was discussed with supervising physician, Dr. Rea while present in the office at the time of the visit. \par \levi Ureña NP

## 2021-09-17 ENCOUNTER — APPOINTMENT (OUTPATIENT)
Dept: PULMONOLOGY | Facility: CLINIC | Age: 81
End: 2021-09-17
Payer: MEDICARE

## 2021-09-17 VITALS
HEIGHT: 69 IN | OXYGEN SATURATION: 98 % | RESPIRATION RATE: 16 BRPM | WEIGHT: 190 LBS | BODY MASS INDEX: 28.14 KG/M2 | HEART RATE: 87 BPM | TEMPERATURE: 97.6 F | DIASTOLIC BLOOD PRESSURE: 70 MMHG | SYSTOLIC BLOOD PRESSURE: 120 MMHG

## 2021-09-17 DIAGNOSIS — L20.9 ATOPIC DERMATITIS, UNSPECIFIED: ICD-10-CM

## 2021-09-17 PROCEDURE — 99214 OFFICE O/P EST MOD 30 MIN: CPT

## 2021-09-17 NOTE — ASSESSMENT
[FreeTextEntry1] : Mr. Hassan is 81 year old Male who is stable from a pulmonary perspective, exercising and asymptomatic. He has a history of asthma, eosinophilia, COPD, abnormal CT, CAD, OSAS, RLS and neuropathy (feet). His number one issue is rash c/w atopic dermatitis. #2 is neuropathy\par \par Problem 1: lung cancer screening (stable 11/20)\par -f/u chest CT (last 11/2020, next 11/2021) \par -Lung Cancer Screening is recommended for people between the ages of 55 and 80 with prior 30+ pack year smoking histories. There is irrefutable evidence for realization of lung cancer screening based on two large randomized control trials demonstrating relative reduction in lung cancer mortality for patients undergoing low-dose CT scanning. Risks and benefits reviewed with the patient. \par \par Problem 2: asthma / COPD\par -Quiet off all mediations at this time\par -Asthma is believed to be caused by inherited (genetic) and environmental factor, but its exact cause is unknown. Asthma may be triggered by allergens, lung infections, or irritants in the air. Asthma triggers are different for each person \par -Inhaler technique reviewed as well as oral hygiene techniques reviewed with patient. Avoidance of cold air, extremes of temperature, rescue inhaler should be used before exercise. Order of medication reviewed with patient. Recommended use of a cool mist humidifier in the bedroom \par -COPD is a progressive disease and although it can’t be cured , appropriate management can slow its progression, reduce frequency and severity of exacerbations, and improve symptoms and the patient quality of life. Hospitalizations are the greatest contributor to the total COPD costs and account for up to 87% of total COPD related costs. Exacerbations are the main cause of admissions and subsequently account for the 40-75% of COPD costs. Inhaled maintenance therapy reduces the incidence of exacerbations in patients with stable COPD. Incorrect inhaler use and nonadherence are major obstacles to achieving COPD treatment goals. Many COPD patients have challenges (impaired inhalation, limited dexterity, reduced cognition: that limit their ability to correctly use their COPD treatment devices resulting in reduced symptom control. Of most importance is smoking cessation and early intervention with respiratory illnesses and contemplation for pulmonary rehab to enhance quality of life.\par \par Problem 2A: Eosinophilic Asthma\par -Candidate for Fasenra, Nucala, Dupixent\par - The safety and efficacy of Nucala was established in three double-blind , randomized, placebo controlled trials in patients with severe asthma. Compared to a placebo, patients with severe asthma receiving Nucala hada fever exacerbation requiring hospitalization and.or emergency department visits, and a longer time to first exacerbation. In addition, patients with severe asthma receiving Nucala or Fasenra experienced greater reductions in their daily maintenance oral corticosteroid dose,m while maintaining asthma control compared with patients receiving placebo. Treatment with Nucala did not result in a significant improvement in lung function as measured by the volume of air exhaled by patients in one second. The most common side effects include: headache, injection site reactions back pain, weakness and fatigue; hypersensitivity reactions can occur within hours or days including swelling of the face, mouth and tongue, fainting, dizziness, hives, breathing problems and rash; herpes zoster infections have occurred. The drug is a monoclonal antibody that inhibits interleukin-5 which helps regular eosinophils, a type of white blood cell that contributes to asthma. The over-prodcution of eosinophils can cause inflammation in the lungs, increasing the frequency of asthma attacks. Patients must also take other medications, including high dose inhaled corticosteroids and at least one additional asthma drug.\par \par Problem 2B: Rash c/w atopic dermatitis\par -s/p dermatology evaluation\par -Initiate Dupixent\par -recommended Borage and Flax Seed Oil \par -Recommended to see Dr. Ruby (dermatology)\par \par Problem 3: GERD\par -continue Nexium 40 mg QAM\par \par -Rule of 2's- Avoid eating too much, too late, too poorly, too spicy, or two hours before bed \par -Things to avoid including overeating, spicy foods, tight clothing, eating within three hours of bed, this list is not all inclusive. \par -For treatment of reflux, possible options discussed including diet control, H2 blockers, PPIs, as well as coating motility agents discussed as treatment options. Timing of meals and proximity of last meal to sleep were discussed. If symptoms persist, a formal gastrointestinal evaluation is needed.\par \par Problem 4: Allergies/ sinus\par -continue nasal saline PRN \par -recommended OTC antihistamines\par Environmental measures for allergies were encouraged including mattress and pillow cover, air purifier, and environmental controls.\par \par Problem 5: OSAS\par -recommended to use "Chin Strap" \par -intolerable of other treatments - CPAP and dental device \par -Sleep guard recommended QD at night \par -continue to use Benadryl PRN \par -Recommended Oxy-aid\par -Discussed the risks/associations with coronary artery disease, atrial fibrillation, arrhythmia, memory loss, issues with concentration, stroke risk, hypertension, nocturia, chronic reflux/Interiano’s esophagus some but not all inclusive. Treatment options discussed including CPAP/BiPAP machine, oral appliance, ProVent therapy, Oxy-Aid by [employer]pite[employer], new technologies, or positional sleep. \par \par Problem 6: Fatigue\par -CoQ-10 200 recommended \par -continue supplemental vitamin D\par \par Problem 7: RLS\par -off medications at this time\par Restless Legs Syndrome (RLS), also known as Reyes- Ekbom Disease, is a common sleep -related movement disorder. About 1 in 10 adults in the [place] have problems from restless leg syndrome. It also can be seen in about 2% of children. Women are twice as likely as men to have RLS. People with RLS will have symptoms most often during times when they are less active, especially at bedtime. RLS most often causes an overwhelming urge to move your legs and sometimes other parts of your body. This urge is associated with unpleasant sensations in different parts of the body. The symptoms can be mild to severe and can affect your ability to go to sleep and stay asleep. People with RLS often sleep less at night and feel more tired during the day. \par \par Problem 8: Neuropathy\par -Continue Neurontin 100 QHS\par -recommended Neuro Renew\par -recommended B complex Vitamin \par -recommended P6 cream\par -recommended L Glutamine 500 mg BID \par -recommended to use Alpha- Lipoic Acid 500 mg BID\par \par Problem 9: health maintenance\par -s/p COVID Vaccine Moderna x 2\par -s/p influenza vaccine 09/2020\par -recommended strep pneumonia vaccines: Prevnar-13 vaccine, followed by Pneumo vaccine 23 on year following (completed) \par -recommended early intervention for URIs\par -recommended osteoporosis evaluations\par -recommended early dermatological evaluations\par -recommended after the age of 50 to the age of 70, colonoscopy every 5 years\par -encouraged early intervention\par \par \par F/u in 6 months - SPI \par pt is encouraged to call or fax the office with any questions or concerns.

## 2021-09-17 NOTE — HISTORY OF PRESENT ILLNESS
[FreeTextEntry1] : Mr. Hassan is a 81 year old male with a history of abnormal chest CT, allergic rhinitis, asthma, centrilobular emphysema, GERD, KARAN, and SOB presenting to the office today for a follow up visit. \par -he notes a rash on his upper chest that has been there for at least 2 months\par -he notes seeing a dermatologist for it\par -he notes having this rash before and was put on betamethasone\par -he notes having this 6 months ago and it went away with betamethasone\par -he notes trying the betamethasone again and it has not worked\par -he notes it is making him scratch the rash at night\par -he denies sleeping well because the rash has been keeping him up\par -he notes taking CoQ-10 and saw it could possibly create rashes so he stopped taking it\par -he notes taking another Rx which he does not remember the name but looked up side effects and noted that rash was not one of them\par -he denies SOB\par -he notes s/p blood work from Dr. Rea\par -he denies a biopsy for the rash\par -he notes the rash is not on any other part of his body\par -he notes s/p COVID-19 test 9/16/2021 and the result is pending\par -he notes a pending colonoscopy 9/21/2021\par -he notes the dermatologist thinks the rash is a yeast infection\par \par patient denies any headaches, nausea, vomiting, fever, chills, sweats, chest pain, chest pressure, palpitations, coughing, wheezing, fatigue, diarrhea, constipation, dysphagia, myalgias, dizziness, leg swelling, leg pain, itchy eyes, itchy ears, heartburn, reflux or sour taste in the mouth

## 2021-09-17 NOTE — PHYSICAL EXAM
[No Acute Distress] : no acute distress [Normal Oropharynx] : normal oropharynx [II] : Mallampati Class: II [Normal Appearance] : normal appearance [No Neck Mass] : no neck mass [Normal Rate/Rhythm] : normal rate/rhythm [Normal S1, S2] : normal s1, s2 [No Murmurs] : no murmurs [No Resp Distress] : no resp distress [Clear to Auscultation Bilaterally] : clear to auscultation bilaterally [Benign] : benign [Normal Gait] : normal gait [No Clubbing] : no clubbing [No Edema] : no edema [No Cyanosis] : no cyanosis [FROM] : FROM [Normal Color/ Pigmentation] : normal color/ pigmentation [No Focal Deficits] : no focal deficits [Oriented x3] : oriented x3 [Normal Affect] : normal affect [TextBox_68] : I:E 1:3; Clear  [TextBox_80] : diffuse rash on upper chest right more than left

## 2021-09-17 NOTE — ADDENDUM
[FreeTextEntry1] : Documented by Quan Mcgrath acting as a scribe for Dr. Raymundo Rai on (09/17/2021).\par \par All medical record entries made by the Scribe were at my, Dr. Raymundo Rai's, direction and personally dictated by me on (09/17/2021). I have reviewed the chart and agree that the record accurately reflects my personal performance of the history, physical exam, assessment and plan. I have also personally directed, reviewed, and agree with the discharge instructions.\par

## 2021-09-21 ENCOUNTER — TRANSCRIPTION ENCOUNTER (OUTPATIENT)
Age: 81
End: 2021-09-21

## 2021-09-22 ENCOUNTER — APPOINTMENT (OUTPATIENT)
Dept: PULMONOLOGY | Facility: CLINIC | Age: 81
End: 2021-09-22
Payer: MEDICARE

## 2021-09-22 PROCEDURE — 95012 NITRIC OXIDE EXP GAS DETER: CPT

## 2021-09-22 PROCEDURE — 94010 BREATHING CAPACITY TEST: CPT

## 2021-09-22 PROCEDURE — 94729 DIFFUSING CAPACITY: CPT

## 2021-11-03 ENCOUNTER — APPOINTMENT (OUTPATIENT)
Dept: CARDIOLOGY | Facility: CLINIC | Age: 81
End: 2021-11-03
Payer: MEDICARE

## 2021-11-03 ENCOUNTER — LABORATORY RESULT (OUTPATIENT)
Age: 81
End: 2021-11-03

## 2021-11-03 ENCOUNTER — NON-APPOINTMENT (OUTPATIENT)
Age: 81
End: 2021-11-03

## 2021-11-03 VITALS
TEMPERATURE: 95.1 F | DIASTOLIC BLOOD PRESSURE: 86 MMHG | HEART RATE: 66 BPM | BODY MASS INDEX: 27.99 KG/M2 | SYSTOLIC BLOOD PRESSURE: 142 MMHG | HEIGHT: 69 IN | WEIGHT: 189 LBS | RESPIRATION RATE: 16 BRPM | OXYGEN SATURATION: 99 %

## 2021-11-03 DIAGNOSIS — I73.9 PERIPHERAL VASCULAR DISEASE, UNSPECIFIED: ICD-10-CM

## 2021-11-03 PROCEDURE — 93922 UPR/L XTREMITY ART 2 LEVELS: CPT

## 2021-11-03 PROCEDURE — 99214 OFFICE O/P EST MOD 30 MIN: CPT

## 2021-11-03 PROCEDURE — 93000 ELECTROCARDIOGRAM COMPLETE: CPT

## 2021-11-03 RX ORDER — ALIROCUMAB 75 MG/ML
75 INJECTION, SOLUTION SUBCUTANEOUS
Qty: 6 | Refills: 1 | Status: DISCONTINUED | COMMUNITY
Start: 2019-10-30 | End: 2021-11-03

## 2021-11-03 RX ORDER — ASPIRIN/ACETAMINOPHEN/CAFFEINE 250-250-65
325 (65 FE) TABLET ORAL
Refills: 0 | Status: COMPLETED | COMMUNITY
End: 2021-11-03

## 2021-11-03 RX ORDER — UBIDECARENONE 200 MG
200 CAPSULE ORAL
Refills: 0 | Status: COMPLETED | COMMUNITY
End: 2021-11-03

## 2021-11-03 RX ORDER — TAMSULOSIN HYDROCHLORIDE 0.4 MG/1
0.4 CAPSULE ORAL
Qty: 90 | Refills: 1 | Status: COMPLETED | COMMUNITY
Start: 2021-07-21 | End: 2021-11-03

## 2021-11-03 NOTE — PHYSICAL EXAM
[General Appearance - Well Developed] : well developed [Normal Appearance] : normal appearance [Well Groomed] : well groomed [General Appearance - Well Nourished] : well nourished [No Deformities] : no deformities [General Appearance - In No Acute Distress] : no acute distress [Normal Conjunctiva] : the conjunctiva exhibited no abnormalities [Normal Oral Mucosa] : normal oral mucosa [No Oral Pallor] : no oral pallor [Normal Oropharynx] : normal oropharynx [Normal Jugular Venous A Waves Present] : normal jugular venous A waves present [Normal Jugular Venous V Waves Present] : normal jugular venous V waves present [No Jugular Venous Jewell A Waves] : no jugular venous jewell A waves [Respiration, Rhythm And Depth] : normal respiratory rhythm and effort [Exaggerated Use Of Accessory Muscles For Inspiration] : no accessory muscle use [Auscultation Breath Sounds / Voice Sounds] : lungs were clear to auscultation bilaterally [Bowel Sounds] : normal bowel sounds [Abdomen Soft] : soft [Abdomen Tenderness] : non-tender [Abnormal Walk] : normal gait [Nail Clubbing] : no clubbing of the fingernails [Cyanosis, Localized] : no localized cyanosis [Petechial Hemorrhages (___cm)] : no petechial hemorrhages [Skin Color & Pigmentation] : normal skin color and pigmentation [Skin Turgor] : normal skin turgor [] : no rash [No Venous Stasis] : no venous stasis [Oriented To Time, Place, And Person] : oriented to person, place, and time [Impaired Insight] : insight and judgment were intact [Affect] : the affect was normal [No Anxiety] : not feeling anxious [5th Left ICS - MCL] : palpated at the 5th LICS in the midclavicular line [Normal] : normal [No Precordial Heave] : no precordial heave was noted [Normal Rate] : normal [Rhythm Regular] : regular [Normal S1] : normal S1 [Normal S2] : normal S2 [No Gallop] : no gallop heard [No Murmur] : no murmurs heard [II] : a grade 2 [I] : a grade 1 [2+] : left 2+ [No Abnormalities] : the abdominal aorta was not enlarged and no bruit was heard [No Pitting Edema] : no pitting edema present [Rt] : varicose veins of the right leg noted [Lt] : varicose veins of the left leg noted [Apical Thrill] : no thrill palpable at the apex [S3] : no S3 [S4] : no S4 [Click] : no click [Pericardial Rub] : no pericardial rub [Right Carotid Bruit] : no bruit heard over the right carotid [Left Carotid Bruit] : no bruit heard over the left carotid [Right Femoral Bruit] : no bruit heard over the right femoral artery [Left Femoral Bruit] : no bruit heard over the left femoral artery

## 2021-11-03 NOTE — DISCUSSION/SUMMARY
[FreeTextEntry1] : This is a 81-year-old male with past medical history significant for coronary artery disease, status post stent to right coronary artery, hypertension, hyperlipidemia, statin intolerance, mitral valve regurgitation, who comes in for followup cardiac evaluation.  The patient does get occasional cramping in his legs in addition to his fatigue.\par Electrocardiogram done November 3, 2021 demonstrate normal sinus rhythm rate 66 bpm is otherwise within normal limits.\par The patient discontinued his Micardis 40 mg/day therapy for hypertension due to fatigue.  He feels a little better off Micardis therapy.  However his blood pressure is elevated.  He will start Norvasc 5 mg daily.\par He is also reports that his insurance does not include Praluent as a preferred PCSK9 treatment and he may have to switch to Repatha therapy.\par Ankle-brachial index done November 3, 2021 was nondiagnostic.\par He is tolerating Micardis 40 mg daily with better blood pressure.  He does complain of occasional fatigue and I have asked him to switch his Micardis to after dinner.  I feel that his fatigue is most likely secondary to his gabapentin.\par The patient is tolerating his PCSK9 injectable therapy with Praluent 75 mg every 2 weeks.  He is not taking Zetia at this time.\par He is instructed to limit his salt intake and continue his daily aerobic activity.\par Electrocardiogram done May 18, 2021 demonstrate normal sinus rhythm rate 62 bpm and is otherwise unremarkable.\par PMH:\par The patient reports that the Zetia was making him feel groggy.  I have explained to him that I feel his gabapentin 3 times a day is more likely causing that side effect.\par His LDL cholesterol needs to be less than 70 mg/dL.\par The patient also complains of bilateral cramping in his calf muscles when walking.  He is no longer on Crestor 5 mg/day.  However given his risk profile, the patient will have an ankle-brachial index done today.\par .He had recent venous ablation done January 7, 2020 by vascular surgery.  This was complicated by localized infection.  He will followup with his vascular surgeon.\par Lifestyle modification was reinforced.\par The patient was on Praluent because of statin intolerance.\par \par PMH:\par He had a cardiac catheterization done November 10, 2011 which revealed a 40% lesion in the left anterior descending artery, luminal irregularities in the distal left main artery and circumflex artery, 20% tubular stenosis at the site of the prior stent. Medical therapy was recommended at that time.\par He had a bare-metal stent placed in the proximal mid right coronary artery in February 2002.\par \par The patient understands that aerobic exercises must be increased to 40 minutes 4 times per week. A detailed discussion of lifestyle modification was done today. The patient has a good understanding of the diagnosis, and treatment plan. Lifestyle modification was also outlined.

## 2021-11-03 NOTE — ASSESSMENT
[FreeTextEntry1] : 11/3/21: \par \par CAD \par HLD\par HTN\par \par LDL is suboptimal but intol to statin and zetia. Trial bempedoic acid\par BP suboptimal, trial amlodipine 5mg\par could not tolerate ASA due to hematuria\par \par ====Prior visit====\par \par Prior note nurse practitioner Enrique January 5, 2021:\par \par 80-year-old male with a past medical history significant for coronary artery disease, s/p  stent to right coronary artery, hypertension, hyperlipidemia, statin intolerance, mitral valve regurgitation, comes in for followup cardiac evaluation. He is s/p b/l venous ablation on January 7, 2020 with Dr. Brooks. \par \par He is feeling generally well today and denies chest pain, dizziness, heart palpitations, recent episodes of syncope or falls, SOB, or dyspnea at this time. \par \par -Pt is stable from a cardiac standpoint and does not have any complaints at this time. \par -Cardiac risk factors include HTN, HLD.\par -BP is well controlled in today's visit.\par -New blood work was done 10/5/2020 to evaluate lipid profile, CBC, BMP, hepatic function, A1C and TSH. Pt currently taking Praluent 75mg. His last injection was last week. Cholesterol 152, HDL 50 and LDL 67. He states that he was on zetia previousy but stopped because he thought it was giving him aches and pains. I explained in his prior visit that this is unlikely the cause. He does not complain of any pains on today's exam.\par -We will do new blood work during his follow up appointment in 3 months.\par \par -EKG done Jan 5th 2021 which demonstrated regular sinus rhythm with nonspecific ST-T wave changes, BPM of 75.\par -Echocardiogram done on 10/5/2020 demonstrated mitral annular  calcification with moderate mitral regurgitation, thickened aortic valve, mild atrial enlargement, moderate tricuspid regurgitation and mild pulmonic regurgitation with normal left ventricular systolic function.\par \par I have discussed the plan of care with Mr. ROSCOE CRUZ  and he  will follow up in 3 months. He is compliant with all of his  medications.\par \par The patient understands that aerobic exercises must be increased to minutes 4 times/week and a detailed discussion of lifestyle modification was done today. \par The patient has a good understanding of the diagnosis, treatment plan and lifestyle modification. \par He will contact me at the office for any questions with their care or any changes in their health status.\par \par The plan of care was discussed with supervising physician, Dr. Rea while present in the office at the time of the visit. \par \par Adelita OR[facility]

## 2021-11-04 ENCOUNTER — NON-APPOINTMENT (OUTPATIENT)
Age: 81
End: 2021-11-04

## 2021-11-04 PROBLEM — I73.9 CLAUDICATION: Status: ACTIVE | Noted: 2018-11-12

## 2021-11-27 ENCOUNTER — EMERGENCY (EMERGENCY)
Facility: HOSPITAL | Age: 81
LOS: 1 days | Discharge: HOME | End: 2021-11-27
Attending: EMERGENCY MEDICINE | Admitting: EMERGENCY MEDICINE
Payer: MEDICARE

## 2021-11-27 VITALS
WEIGHT: 190.04 LBS | HEART RATE: 87 BPM | RESPIRATION RATE: 18 BRPM | DIASTOLIC BLOOD PRESSURE: 72 MMHG | HEIGHT: 69 IN | OXYGEN SATURATION: 100 % | TEMPERATURE: 98 F | SYSTOLIC BLOOD PRESSURE: 132 MMHG

## 2021-11-27 DIAGNOSIS — Z98.61 CORONARY ANGIOPLASTY STATUS: Chronic | ICD-10-CM

## 2021-11-27 DIAGNOSIS — Z98.890 OTHER SPECIFIED POSTPROCEDURAL STATES: Chronic | ICD-10-CM

## 2021-11-27 PROCEDURE — 73564 X-RAY EXAM KNEE 4 OR MORE: CPT | Mod: 26,RT

## 2021-11-27 PROCEDURE — 73564 X-RAY EXAM KNEE 4 OR MORE: CPT

## 2021-11-27 PROCEDURE — 99284 EMERGENCY DEPT VISIT MOD MDM: CPT | Mod: 25

## 2021-11-27 PROCEDURE — 99283 EMERGENCY DEPT VISIT LOW MDM: CPT

## 2021-11-27 RX ORDER — ROSUVASTATIN CALCIUM 5 MG/1
1 TABLET ORAL
Qty: 0 | Refills: 0 | DISCHARGE

## 2021-11-27 RX ORDER — DOXAZOSIN MESYLATE 4 MG
1 TABLET ORAL
Qty: 0 | Refills: 0 | DISCHARGE

## 2021-11-27 RX ORDER — MULTIVIT WITH MIN/MFOLATE/K2 340-15/3 G
500 POWDER (GRAM) ORAL
Qty: 0 | Refills: 0 | DISCHARGE

## 2021-11-27 RX ORDER — FINASTERIDE 5 MG/1
1 TABLET, FILM COATED ORAL
Qty: 0 | Refills: 0 | DISCHARGE

## 2021-11-27 RX ORDER — FERROUS SULFATE 325(65) MG
0 TABLET ORAL
Qty: 0 | Refills: 0 | DISCHARGE

## 2021-11-27 RX ORDER — DIPHENHYDRAMINE HCL 50 MG
2 CAPSULE ORAL
Qty: 0 | Refills: 0 | DISCHARGE

## 2021-11-27 RX ORDER — OMEPRAZOLE 10 MG/1
1 CAPSULE, DELAYED RELEASE ORAL
Qty: 0 | Refills: 0 | DISCHARGE

## 2021-11-27 RX ORDER — UBIDECARENONE 100 MG
1 CAPSULE ORAL
Qty: 0 | Refills: 0 | DISCHARGE

## 2021-11-27 RX ORDER — EZETIMIBE 10 MG/1
1 TABLET ORAL
Qty: 0 | Refills: 0 | DISCHARGE

## 2021-11-27 NOTE — ED PROVIDER NOTE - NSICDXPASTSURGICALHX_GEN_ALL_CORE_FT
PAST SURGICAL HISTORY:  H/O Shoulder rotated cuff surgery Right    History of Appendectomy     History of Tonsillectomy     IH (Inguinal Hernia) Bilateral age 7    S/P PTCA (percutaneous transluminal coronary angioplasty) 2002 has 2 stents    S/P rotator cuff repair right 2015

## 2021-11-27 NOTE — ED PROVIDER NOTE - ATTENDING CONTRIBUTION TO CARE
pt c/o right knee pain s/p trip and fall onto knee last night. pt reports difficulty bearing weight. no head inj, loc, ha, d/n/v, neck or back pain, cp, sob, abd pain, arm pain, weakness or numbness.  wd, wn male, nad, ncat, perrl, eomi, mmm, s1s2 rrr lungs cta, ribs nt, abd soft, nt, no cvat, neck and back, nt, ue's nt, full rom, lle nt, full rom, rle hip nt, full rom, knee tender anteriorly with mild decreased rom, ankle, nt full rom neuro no motor or sensory deficits

## 2021-11-27 NOTE — ED PROVIDER NOTE - OBJECTIVE STATEMENT
81 year old male with history of CAD, HTN, HLD, and BPH presents with right knee pain after trip and fall 2 days ago. denies hitting head or LOC. does not take any blood thinners. denies HA, dizziness, neck pain, back pain, chest pain, or abd pain. was able to walk yesterday with slight limp, increased difficulty walking today (able to walk, but "very slowly". no pain at rest, increased pain when walking. has been taking tylenol with unsure improvement of pain   PCP Ran Navarro

## 2021-11-27 NOTE — ED PROVIDER NOTE - NSICDXPASTMEDICALHX_GEN_ALL_CORE_FT
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

## 2021-11-27 NOTE — ED PROVIDER NOTE - CARE PROVIDER_API CALL
Frankie Cordon)  Orthopaedic Surgery  [address] [place] [address]  Phone: [phone]  Fax: [phone]  Follow Up Time: 1-3 Days

## 2021-11-27 NOTE — ED PROVIDER NOTE - MUSCULOSKELETAL, MLM
mild tenderness to anterior aspect of right knee. no laxity appreciated, but mild pain with anterior drawer. patellar tendon intact. able to SLR. no deformities. no hip or ankle tenderness

## 2021-11-27 NOTE — ED PROVIDER NOTE - PROGRESS NOTE DETAILS
spoke with on call ortho (Dr. Cordon) regarding suspicion for non-displaced patellar fx. will see patient in ED Reevaluated patient at bedside.  Patient feeling much improved.  Discussed the results of all diagnostic testing in ED. was seen and evaluated by Dr. Cordon. ace wrap applied. states does not need immobilizer. has cane at home if needed. ambulatory with steady gait in ED. will follow up in office next week. referral information provided. An opportunity to ask questions was given.  Discussed the importance of prompt, close medical follow-up.  Patient will return with any changes, concerns or persistent / worsening symptoms.  Understanding of all instructions verbalized.

## 2021-11-27 NOTE — ED PROVIDER NOTE - NSFOLLOWUPINSTRUCTIONS_ED_ALL_ED_FT
ice, elevate, ace wrap  tylenol over the counter for pain  follow up with orthopedics next week, referral provided       Patellar Fracture    WHAT YOU NEED TO KNOW:    A patellar fracture is a break in your kneecap.    Knee Anatomy          DISCHARGE INSTRUCTIONS:    Call your local emergency number (911 in the [place]) if:   •You suddenly feel lightheaded and short of breath.      •You have chest pain when you take a deep breath or cough.      •You cough up blood.      Return to the emergency department if:   •Your cast or splint breaks or gets damaged.      •Your foot or toes are swollen, cold, numb, or they turn white or blue.      •Your leg feels warm, tender, and painful. It may look swollen and red.      Call your doctor or bone specialist if:   •You have a fever.      •Your pain gets worse, even after treatment.      •You have questions or concerns about your condition or care.      Medicines: You may need any of the following:   •Prescription pain medicine may be given. Ask your healthcare provider how to take this medicine safely. Some prescription pain medicines contain acetaminophen. Do not take other medicines that contain acetaminophen without talking to your healthcare provider. Too much acetaminophen may cause liver damage. Prescription pain medicine may cause constipation. Ask your healthcare provider how to prevent or treat constipation.       •Antibiotics help fight or treat a bacterial infection.      •Take your medicine as directed. Contact your healthcare provider if you think your medicine is not helping or if you have side effects. Tell him or her if you are allergic to any medicine. Keep a list of the medicines, vitamins, and herbs you take. Include the amounts, and when and why you take them. Bring the list or the pill bottles to follow-up visits. Carry your medicine list with you in case of an emergency.      Brace, cast, or splint care:   •Check the skin around the device every day. Apply lotion to any red or sore areas.      •Ask your healthcare provider when you can bathe. Do not get the device wet. Cover it with 2 plastic bags. Tape the bags above the device to prevent water from getting in. Keep your knee out of the water as much as possible.      •Do not push or lean on any part of the cast or splint.      •Do not put any sharp or pointed objects inside the cast.      Self-care:   •Rest your knee as directed. Crutches help rest and support your knee when you walk. Your healthcare provider will tell you when you can start to use crutches. Follow instructions about how much weight to put on your leg.  Walking with Crutches           •Apply ice to help decrease swelling and pain. Use an ice pack, or put crushed ice in a plastic bag. Cover it with a towel before you place it on your knee or supportive device. Apply ice for 15 to 20 minutes every hour for 2 days, or as directed.  Ice and Elevation           •Elevate your knee above the level of your heart as often as you can. This will help decrease swelling and pain. prop your leg on pillows or blankets to keep it elevated comfortably. Do not put pillows directly under your knee.      Go to physical therapy, if recommended. A physical therapist teaches you exercises to help improve movement and strength, and to decrease pain.    Follow up with your doctor or bone specialist as directed: You may need to return to have your stitches removed. Write down your questions so you remember to ask them during your visits.

## 2021-11-27 NOTE — ED ADULT TRIAGE NOTE - CHIEF COMPLAINT QUOTE
tripped and fell and landed right knee and unable to bear weight on it since   denies head trauma or any other injury

## 2021-11-27 NOTE — CONSULT NOTE ADULT - SUBJECTIVE AND OBJECTIVE BOX
80 y/o male s/p fall yesterday on level ground and struck the right knee on the ground  states that the pain worsened this AM and was unable to ambulate   denies any other injuries    PE:  - swelling right knee anterior  - compartments soft  - palpable quad and patella tendon  - tenderness at the patella anterior and medial proximal pole  - no quad or lower leg tenderness  - able to actively SLR  - no pain at the leg or ankle or foot otherwise  - selling right knee > left   - negative rahul, varus and valgus, and ant drawer  - strength 4+ knee ext and flex  - AROM 0-100  - no subluxation or dislocation of the patella     xray  right vertical fracture patella    A/P  - wbat for amb only, no sports  - RICE  - tylenol as he is not able to take antiinflammatory meds  - closed treatment of the patella fracture and compressive wrap for support and reduction   - will follow in office with serial xrays to monitor healing of fracture and knee contusion  - if any change in presentation will notify the ER

## 2021-11-27 NOTE — ED ADULT NURSE NOTE - OBJECTIVE STATEMENT
82 y/o male received aox4 ambulatory c/o right knee pain x 2 days. pt is s/p trip and fall 2 days ago landing on his right knee. denies head trauma. pt able to bear weight but reports pain.

## 2021-11-27 NOTE — ED PROVIDER NOTE - PATIENT PORTAL LINK FT
You can access the FollowMyHealth Patient Portal offered by [facility] by registering at the following website: [email or web address]. By joining [employer]’s FollowMyHealth portal, you will also be able to view your health information using other applications (apps) compatible with our system.

## 2021-11-27 NOTE — ED ADULT NURSE NOTE - IS THE PATIENT ABLE TO BE SCREENED?
Candida Skin Infection (Adult)  Candida is type of yeast. It grows naturally on the skin and in the mouth. If it grows out of control, it can cause an infection. Candida can cause infections in the genital area, skin folds, and under the breasts. Anyone can get this infection. It is more common in a person with a weakened immune system, such as from diabetes, HIV, or cancer. It’s also more common in someone who has been on antibiotic therapy. And it’s more common people who are overweight or who have incontinence. Wearing tight-fitting clothing and taking part in activities with lots of skin-to-skin contact can also put you at risk.  Candida causes the skin to become bright red and inflamed. The border of the infected part of the skin is often raised. The infection causes pain and itching. Sometimes the skin peels and bleeds.  A Candida rash is most often treated with an antifungal cream or ointment. The rash will clear a few days after starting the medication. Infections that don’t go away may need a prescription medication. In rare cases, a bacterial infection can also occur.  Home care  Your health care provider will recommend an antifungal cream or ointment for the rash. He or she may also prescribe a medication for the itch. Follow all instructions for using these medications. Don’t use cornstarch powder. Cornstarch can cause the Candida infection to get worse.  General care:  · Keep your skin clean by washing the area twice a day.  · Use the cream as directed until your rash is gone. Once the skin has healed, keep it dry to prevent another infection.   · If you are overweight, talk with your health care provider about a plan to lose excess weight.  · Avoid clothes that fit tightly.  Follow-up care  Your rash will clear in 7 to 14 days. Follow up with your health care provider if the rash is not gone after 14 days.  When to seek medical advice  Call your health care provider right away if any of these  occur:  · Pain or redness that gets worse or spreads  · Fluid coming from the skin  · Yellow crusts on the skin  · Fever of 100.4°F (38°C) or higher, or as directed by your health care provider     © 5386-0609 [employer] [address] [place] [address]. All rights reserved. This information is not intended as a substitute for professional medical care. Always follow your healthcare professional's instructions.         Yes

## 2021-11-27 NOTE — ED PROVIDER NOTE - CLINICAL SUMMARY MEDICAL DECISION MAKING FREE TEXT BOX
right knee pain after trip and fall. x-ray to eval for fx. NIKO discussed. knee immobilizer. ortho follow up. denies hitting head or LOC. do not suspect ICH or skull fx

## 2022-01-01 NOTE — ED ADULT NURSE NOTE - SUICIDE SCREENING DEPRESSION
Goal Outcome Evaluation:      Received infant from delivery room pale and limp with poor respiratory effort and brought to NICU. Started with Bubble CPAP PEEP +6 with 40% FiO2. OG was place 0400 at 22 cms. Inserted PIV at left hand then Nacl 0.9 % 35 mls given slow push. Labs drawn and sent. Xray done and confirm pneumothorax at right side. IV antibiotics given as charted. Started with D10 and run at 8.4 ml/hr. Place the central line with [facility] at [facility] in placed. Atropine 0.68mg given at 0511 through PIV at left hand and infant is intubated while the warmer was turned to zero. Erythromycin given on both eyes and Vit K at right anterior thigh. Second dose of Morphine 0.35 mg given by transport team bolus at 0638. Transferred to [facility] stable sedated with the transport team at 0642.                 Negative

## 2022-01-24 ENCOUNTER — NON-APPOINTMENT (OUTPATIENT)
Age: 82
End: 2022-01-24

## 2022-01-24 ENCOUNTER — APPOINTMENT (OUTPATIENT)
Dept: PULMONOLOGY | Facility: CLINIC | Age: 82
End: 2022-01-24
Payer: MEDICARE

## 2022-01-24 VITALS
OXYGEN SATURATION: 98 % | HEART RATE: 68 BPM | HEIGHT: 69 IN | SYSTOLIC BLOOD PRESSURE: 120 MMHG | DIASTOLIC BLOOD PRESSURE: 68 MMHG | TEMPERATURE: 95.4 F | BODY MASS INDEX: 28.14 KG/M2 | RESPIRATION RATE: 16 BRPM | WEIGHT: 190 LBS

## 2022-01-24 PROCEDURE — 94010 BREATHING CAPACITY TEST: CPT

## 2022-01-24 PROCEDURE — 99214 OFFICE O/P EST MOD 30 MIN: CPT | Mod: 25

## 2022-01-24 PROCEDURE — 95012 NITRIC OXIDE EXP GAS DETER: CPT

## 2022-01-24 NOTE — ADDENDUM
[FreeTextEntry1] : Documented by Gretchen Smith acting as a scribe for Dr. Raymundo Rai on 01/24/2022 \par \par All medical record entries made by the Scribe were at my, Dr. Raymundo Rai's, direction and personally dictated by me on 01/24/2022 . I have reviewed the chart and agree that the record accurately reflects my personal performance of the history, physical exam, assessment and plan. I have also personally directed, reviewed, and agree with the discharge instructions

## 2022-01-24 NOTE — PHYSICAL EXAM
[No Acute Distress] : no acute distress [Normal Oropharynx] : normal oropharynx [Normal Appearance] : normal appearance [No Neck Mass] : no neck mass [Normal Rate/Rhythm] : normal rate/rhythm [Normal S1, S2] : normal s1, s2 [No Murmurs] : no murmurs [No Resp Distress] : no resp distress [Clear to Auscultation Bilaterally] : clear to auscultation bilaterally [Benign] : benign [Normal Gait] : normal gait [No Clubbing] : no clubbing [No Cyanosis] : no cyanosis [No Edema] : no edema [FROM] : FROM [Normal Color/ Pigmentation] : normal color/ pigmentation [No Focal Deficits] : no focal deficits [Oriented x3] : oriented x3 [Normal Affect] : normal affect [III] : Mallampati Class: III [TextBox_68] : I:E 1:3; Clear  [TextBox_80] : diffuse rash on upper chest right more than left

## 2022-01-24 NOTE — ASSESSMENT
[FreeTextEntry1] : Mr. Hassan is 81 year old Male who is stable from a pulmonary perspective, exercising and asymptomatic. He has a history of asthma, eosinophilia, COPD, abnormal CT, CAD, OSAS, RLS and neuropathy (feet). His number one issue is pandemic concern #2 is neuropathy\par \par Problem 1: lung cancer screening (stable 11/20)\par -f/u chest CT (last 11/2020, next 11/2021) - OVERDUE\par -Lung Cancer Screening is recommended for people between the ages of 55 and 80 with prior 30+ pack year smoking histories. There is irrefutable evidence for realization of lung cancer screening based on two large randomized control trials demonstrating relative reduction in lung cancer mortality for patients undergoing low-dose CT scanning. Risks and benefits reviewed with the patient. \par \par Problem 2: asthma / COPD\par -Quiet off all mediations at this time\par -Asthma is believed to be caused by inherited (genetic) and environmental factor, but its exact cause is unknown. Asthma may be triggered by allergens, lung infections, or irritants in the air. Asthma triggers are different for each person \par -Inhaler technique reviewed as well as oral hygiene techniques reviewed with patient. Avoidance of cold air, extremes of temperature, rescue inhaler should be used before exercise. Order of medication reviewed with patient. Recommended use of a cool mist humidifier in the bedroom \par -COPD is a progressive disease and although it can’t be cured , appropriate management can slow its progression, reduce frequency and severity of exacerbations, and improve symptoms and the patient quality of life. Hospitalizations are the greatest contributor to the total COPD costs and account for up to 87% of total COPD related costs. Exacerbations are the main cause of admissions and subsequently account for the 40-75% of COPD costs. Inhaled maintenance therapy reduces the incidence of exacerbations in patients with stable COPD. Incorrect inhaler use and nonadherence are major obstacles to achieving COPD treatment goals. Many COPD patients have challenges (impaired inhalation, limited dexterity, reduced cognition: that limit their ability to correctly use their COPD treatment devices resulting in reduced symptom control. Of most importance is smoking cessation and early intervention with respiratory illnesses and contemplation for pulmonary rehab to enhance quality of life.\par \par Problem 2A: Eosinophilic Asthma\par -Candidate for Fasenra, Nucala, Dupixent\par - The safety and efficacy of Nucala was established in three double-blind , randomized, placebo controlled trials in patients with severe asthma. Compared to a placebo, patients with severe asthma receiving Nucala hada fever exacerbation requiring hospitalization and.or emergency department visits, and a longer time to first exacerbation. In addition, patients with severe asthma receiving Nucala or Fasenra experienced greater reductions in their daily maintenance oral corticosteroid dose,m while maintaining asthma control compared with patients receiving placebo. Treatment with Nucala did not result in a significant improvement in lung function as measured by the volume of air exhaled by patients in one second. The most common side effects include: headache, injection site reactions back pain, weakness and fatigue; hypersensitivity reactions can occur within hours or days including swelling of the face, mouth and tongue, fainting, dizziness, hives, breathing problems and rash; herpes zoster infections have occurred. The drug is a monoclonal antibody that inhibits interleukin-5 which helps regular eosinophils, a type of white blood cell that contributes to asthma. The over-prodcution of eosinophils can cause inflammation in the lungs, increasing the frequency of asthma attacks. Patients must also take other medications, including high dose inhaled corticosteroids and at least one additional asthma drug.\par \par Problem 2B: Rash c/w atopic dermatitis\par -s/p dermatology evaluation\par -Initiate Dupixent IF NEEDED \par -recommended Borage and Flax Seed Oil \par -Recommended to see Dr. Ruby (dermatology)\par \par Problem 3: GERD\par -continue Nexium 40 mg QAM\par \par -Rule of 2's- Avoid eating too much, too late, too poorly, too spicy, or two hours before bed \par -Things to avoid including overeating, spicy foods, tight clothing, eating within three hours of bed, this list is not all inclusive. \par -For treatment of reflux, possible options discussed including diet control, H2 blockers, PPIs, as well as coating motility agents discussed as treatment options. Timing of meals and proximity of last meal to sleep were discussed. If symptoms persist, a formal gastrointestinal evaluation is needed.\par \par Problem 4: Allergies/ sinus\par -continue nasal saline PRN \par -recommended OTC antihistamines\par Environmental measures for allergies were encouraged including mattress and pillow cover, air purifier, and environmental controls.\par \par Problem 5: OSAS\par -recommended to use "Chin Strap" \par -intolerable of other treatments - CPAP and dental device \par -Sleep guard recommended QD at night \par -continue to use Benadryl PRN \par -Recommended Oxy-aid\par -Discussed the risks/associations with coronary artery disease, atrial fibrillation, arrhythmia, memory loss, issues with concentration, stroke risk, hypertension, nocturia, chronic reflux/Interiano’s esophagus some but not all inclusive. Treatment options discussed including CPAP/BiPAP machine, oral appliance, ProVent therapy, Oxy-Aid by [employer], new technologies, or positional sleep. \par \par Problem 6: Fatigue\par -CoQ-10 200 recommended \par -continue supplemental vitamin D\par \par Problem 7: RLS\par -off medications at this time\par Restless Legs Syndrome (RLS), also known as Reyes- Ekbom Disease, is a common sleep -related movement disorder. About 1 in 10 adults in the [place] have problems from restless leg syndrome. It also can be seen in about 2% of children. Women are twice as likely as men to have RLS. People with RLS will have symptoms most often during times when they are less active, especially at bedtime. RLS most often causes an overwhelming urge to move your legs and sometimes other parts of your body. This urge is associated with unpleasant sensations in different parts of the body. The symptoms can be mild to severe and can affect your ability to go to sleep and stay asleep. People with RLS often sleep less at night and feel more tired during the day. \par \par Problem 8: Neuropathy\par -Continue Neurontin 100 QHS\par -recommended Neuro Renew\par -recommended B complex Vitamin \par -recommended P6 cream\par -recommended L Glutamine 500 mg BID \par -recommended to use Alpha- Lipoic Acid 500 mg BID\par \par Problem 9: health maintenance\par -s/p COVID Vaccine Moderna x 3\par -s/p influenza vaccine 2021\par -recommended strep pneumonia vaccines: Prevnar-13 vaccine, followed by Pneumo vaccine 23 on year following (completed) \par -recommended early intervention for URIs\par -recommended osteoporosis evaluations\par -recommended early dermatological evaluations\par -recommended after the age of 50 to the age of 70, colonoscopy every 5 years\par -encouraged early intervention\par \par \par F/u in 6 months - SPI \par pt is encouraged to call or fax the office with any questions or concerns.

## 2022-01-24 NOTE — HISTORY OF PRESENT ILLNESS
[FreeTextEntry1] : Mr. Hassan is a 81 year old male with a history of abnormal chest CT, allergic rhinitis, asthma, centrilobular emphysema, GERD, KARAN, and SOB presenting to the office today for a follow up visit. \par \par -he notes generally feeling good\par -He notes that bowels are regular\par -he notes left arm wound but unsure of the cause \par -he notes regular check ups with all doctor\par -he notes sense of smell and taste are stable \par -he notes catheterized\par -he notes bladder issues \par -he denies UTI\par -he notes exercise 5 times a week for 20 min\par -he notes plan to go to [place] \par -s/p covid vaccine x 3 \par -he notes treating rash and he was allergic to his shaving cream\par -he notes new dermatologist \par -s/p prednisone that helped rash \par \par -denies any visual issues, headaches, nausea, vomiting, fever, chills, sweats, chest pain, chest pressure, diarrhea, constipation, dysphagia, dizziness, leg swelling, leg pain, itchy eyes, itchy ears, heartburn, reflux, or sour taste in the mouth.

## 2022-01-24 NOTE — PROCEDURE
[FreeTextEntry1] : FENO was 24 ; a normal value being less than 25\par Fractional exhaled nitric oxide (FENO) is regarded as a simple, noninvasive method for assessing eosinophilic airway inflammation. Produced by a variety of cells within the lung, nitric oxide (NO) concentrations are generally low in healthy individuals. However, high concentrations of NO appear to be involved in nonspecific host defense mechanisms and chronic inflammatory diseases such as asthma. The American Thoracic Society (ATS) therefore has recommended using FENO to aid in the diagnosis and monitoring of eosinophilic airway inflammation and asthma, and for identifying steroid responsive individuals whose chronic respiratory symptoms may be caused by airway inflammation. \par \par PFT revealed normal flows, with a FEV1 of 2.41L,which is 91% of predicted with a abnormal inspiratory limb

## 2022-01-26 ENCOUNTER — LABORATORY RESULT (OUTPATIENT)
Age: 82
End: 2022-01-26

## 2022-01-26 ENCOUNTER — APPOINTMENT (OUTPATIENT)
Dept: CARDIOLOGY | Facility: CLINIC | Age: 82
End: 2022-01-26
Payer: MEDICARE

## 2022-01-26 VITALS
SYSTOLIC BLOOD PRESSURE: 126 MMHG | DIASTOLIC BLOOD PRESSURE: 70 MMHG | OXYGEN SATURATION: 98 % | HEIGHT: 69 IN | BODY MASS INDEX: 27.85 KG/M2 | WEIGHT: 188 LBS | TEMPERATURE: 98 F | HEART RATE: 70 BPM | RESPIRATION RATE: 15 BRPM

## 2022-01-26 PROCEDURE — 99214 OFFICE O/P EST MOD 30 MIN: CPT

## 2022-01-26 RX ORDER — TELMISARTAN 40 MG/1
40 TABLET ORAL DAILY
Qty: 90 | Refills: 1 | Status: COMPLETED | COMMUNITY
Start: 2021-05-18 | End: 2022-01-26

## 2022-01-26 NOTE — DISCUSSION/SUMMARY
[FreeTextEntry1] : This is a 81-year-old male with past medical history significant for coronary artery disease, status post stent to right coronary artery, hypertension, hyperlipidemia, statin intolerance, mitral valve regurgitation, who comes in for followup cardiac evaluation.  The patient does get occasional cramping in his legs in addition to his fatigue. \par Blood work done November 3, 2021 demonstrated direct LDL of 77 mg/dL with total cholesterol 162 mg/dL.  The patient's target LDL is less than 70 mg/dL.  I have recommended he add Zetia 10 mg daily.  I do not believe his previous symptoms on Zetia related to the Zetia treatment.\par He will have blood work done and 8 to 10 weeks.\par \par Electrocardiogram done November 3, 2021 demonstrate normal sinus rhythm rate 66 bpm is otherwise within normal limits.\par The patient discontinued his Micardis 40 mg/day therapy for hypertension due to fatigue.  He feels a little better off Micardis therapy.  However his blood pressure is elevated.  He will start Norvasc 5 mg daily.\par He is also reports that his insurance does not include Praluent as a preferred PCSK9 treatment and he may have to switch to Repatha therapy.\par Ankle-brachial index done November 3, 2021 was nondiagnostic.\par He is tolerating Micardis 40 mg daily with better blood pressure.  He does complain of occasional fatigue and I have asked him to switch his Micardis to after dinner.  I feel that his fatigue is most likely secondary to his gabapentin.\par The patient is tolerating his PCSK9 injectable therapy with Praluent 75 mg every 2 weeks.  \par He is instructed to limit his salt intake and continue his daily aerobic activity.\par Electrocardiogram done May 18, 2021 demonstrate normal sinus rhythm rate 62 bpm and is otherwise unremarkable.\par PMH:\par The patient reports that the Zetia was making him feel groggy.  I have explained to him that I feel his gabapentin 3 times a day is more likely causing that side effect.\par His LDL cholesterol needs to be less than 70 mg/dL.\par The patient also complains of bilateral cramping in his calf muscles when walking.  He is no longer on Crestor 5 mg/day.  However given his risk profile, the patient will have an ankle-brachial index done today.\par .He had recent venous ablation done January 7, 2020 by vascular surgery.  This was complicated by localized infection.  He will followup with his vascular surgeon.\par Lifestyle modification was reinforced.\par The patient was on Praluent because of statin intolerance.\par \par PMH:\par He had a cardiac catheterization done November 10, 2011 which revealed a 40% lesion in the left anterior descending artery, luminal irregularities in the distal left main artery and circumflex artery, 20% tubular stenosis at the site of the prior stent. Medical therapy was recommended at that time.\par He had a bare-metal stent placed in the proximal mid right coronary artery in February 2002.\par \par The patient understands that aerobic exercises must be increased to 40 minutes 4 times per week. A detailed discussion of lifestyle modification was done today. The patient has a good understanding of the diagnosis, and treatment plan. Lifestyle modification was also outlined.

## 2022-03-25 ENCOUNTER — APPOINTMENT (OUTPATIENT)
Dept: CT IMAGING | Facility: CLINIC | Age: 82
End: 2022-03-25
Payer: MEDICARE

## 2022-03-25 ENCOUNTER — OUTPATIENT (OUTPATIENT)
Dept: OUTPATIENT SERVICES | Facility: HOSPITAL | Age: 82
LOS: 1 days | End: 2022-03-25
Payer: MEDICARE

## 2022-03-25 DIAGNOSIS — Z98.61 CORONARY ANGIOPLASTY STATUS: Chronic | ICD-10-CM

## 2022-03-25 DIAGNOSIS — Z98.890 OTHER SPECIFIED POSTPROCEDURAL STATES: Chronic | ICD-10-CM

## 2022-03-25 DIAGNOSIS — R93.89 ABNORMAL FINDINGS ON DIAGNOSTIC IMAGING OF OTHER SPECIFIED BODY STRUCTURES: ICD-10-CM

## 2022-03-25 PROCEDURE — G1004: CPT

## 2022-03-25 PROCEDURE — 71250 CT THORAX DX C-: CPT | Mod: ME

## 2022-03-25 PROCEDURE — 71250 CT THORAX DX C-: CPT | Mod: 26,ME

## 2022-03-29 ENCOUNTER — NON-APPOINTMENT (OUTPATIENT)
Age: 82
End: 2022-03-29

## 2022-04-11 ENCOUNTER — APPOINTMENT (OUTPATIENT)
Dept: CARDIOLOGY | Facility: CLINIC | Age: 82
End: 2022-04-11
Payer: MEDICARE

## 2022-04-11 ENCOUNTER — NON-APPOINTMENT (OUTPATIENT)
Age: 82
End: 2022-04-11

## 2022-04-11 VITALS
HEIGHT: 69 IN | DIASTOLIC BLOOD PRESSURE: 70 MMHG | WEIGHT: 190 LBS | BODY MASS INDEX: 28.14 KG/M2 | OXYGEN SATURATION: 99 % | RESPIRATION RATE: 16 BRPM | HEART RATE: 72 BPM | SYSTOLIC BLOOD PRESSURE: 130 MMHG | TEMPERATURE: 98.3 F

## 2022-04-11 DIAGNOSIS — R53.83 OTHER FATIGUE: ICD-10-CM

## 2022-04-11 PROCEDURE — 99214 OFFICE O/P EST MOD 30 MIN: CPT | Mod: 25

## 2022-04-11 PROCEDURE — 93000 ELECTROCARDIOGRAM COMPLETE: CPT

## 2022-04-11 NOTE — PHYSICAL EXAM
[General Appearance - Well Developed] : well developed [Normal Appearance] : normal appearance [Well Groomed] : well groomed [General Appearance - Well Nourished] : well nourished [No Deformities] : no deformities [General Appearance - In No Acute Distress] : no acute distress [Normal Oral Mucosa] : normal oral mucosa [No Oral Pallor] : no oral pallor [Normal Oropharynx] : normal oropharynx [Normal Jugular Venous A Waves Present] : normal jugular venous A waves present [Normal Jugular Venous V Waves Present] : normal jugular venous V waves present [No Jugular Venous Jewell A Waves] : no jugular venous jewell A waves [Respiration, Rhythm And Depth] : normal respiratory rhythm and effort [Exaggerated Use Of Accessory Muscles For Inspiration] : no accessory muscle use [Auscultation Breath Sounds / Voice Sounds] : lungs were clear to auscultation bilaterally [Bowel Sounds] : normal bowel sounds [Abdomen Soft] : soft [Abdomen Tenderness] : non-tender [Abnormal Walk] : normal gait [Nail Clubbing] : no clubbing of the fingernails [Cyanosis, Localized] : no localized cyanosis [Petechial Hemorrhages (___cm)] : no petechial hemorrhages [Skin Color & Pigmentation] : normal skin color and pigmentation [Skin Turgor] : normal skin turgor [] : no rash [No Venous Stasis] : no venous stasis [Oriented To Time, Place, And Person] : oriented to person, place, and time [Impaired Insight] : insight and judgment were intact [Affect] : the affect was normal [No Anxiety] : not feeling anxious [II] : a grade 2 [No Abnormalities] : the abdominal aorta was not enlarged and no bruit was heard [Rt] : varicose veins of the right leg noted [Lt] : varicose veins of the left leg noted [Well Developed] : well developed [Well Nourished] : well nourished [No Acute Distress] : no acute distress [Normal Conjunctiva] : normal conjunctiva [Normal Venous Pressure] : normal venous pressure [No Carotid Bruit] : no carotid bruit [Normal S1, S2] : normal S1, S2 [No Murmur] : no murmur [No Rub] : no rub [5th Left ICS - MCL] : palpated at the 5th LICS in the midclavicular line [Normal] : normal [No Precordial Heave] : no precordial heave was noted [Normal Rate] : normal [Rhythm Regular] : regular [Normal S1] : normal S1 [Normal S2] : normal S2 [No Gallop] : no gallop heard [I] : a grade 1 [No Pitting Edema] : no pitting edema present [2+] : left 2+ [Clear Lung Fields] : clear lung fields [Good Air Entry] : good air entry [No Respiratory Distress] : no respiratory distress  [Soft] : abdomen soft [Non Tender] : non-tender [No Masses/organomegaly] : no masses/organomegaly [Normal Bowel Sounds] : normal bowel sounds [Normal Gait] : normal gait [No Edema] : no edema [No Cyanosis] : no cyanosis [No Clubbing] : no clubbing [No Varicosities] : no varicosities [No Rash] : no rash [No Skin Lesions] : no skin lesions [Moves all extremities] : moves all extremities [No Focal Deficits] : no focal deficits [Normal Speech] : normal speech [Alert and Oriented] : alert and oriented [Normal memory] : normal memory [Apical Thrill] : no thrill palpable at the apex [S3] : no S3 [S4] : no S4 [Click] : no click [Pericardial Rub] : no pericardial rub [Right Carotid Bruit] : no bruit heard over the right carotid [Left Carotid Bruit] : no bruit heard over the left carotid [Right Femoral Bruit] : no bruit heard over the right femoral artery [Left Femoral Bruit] : no bruit heard over the left femoral artery

## 2022-04-11 NOTE — ASSESSMENT
[FreeTextEntry1] : This is a 81-year-old male with a past medical history significant for coronary artery disease, s/p  stent to right coronary artery [doesn't tolerate ASA 2/2 bleeding], hypertension, hyperlipidemia, statin intolerance, mitral valve regurgitation, comes in for followup cardiac evaluation. He is s/p b/l venous ablation on January 7, 2020 with Dr. Brooks. \par \par He is here today for follow up cardiac exam. He remains on Amlodipine 5mg PO DAILY and Repatha 140mg/IM next injection due this Wednesday. He states that he never started Zetia 10mg  PO Daily as directed during his last visit with Dr. Rea because he strongly feels this medication was causing muscle aches and fatigue. He had stopped Telmisartan due to fatigue and was placed on Amlodipine 5mg PO DAILY during his last visit and states that his fatigue has not improved. \par \par BLOOD PRESSURE:\par -BP is controlled in today's visit while on Amlodipine 5mg PO DAILY. \par \par -I have discussed the importance of maintaining good BP control and reviewed the newest guidelines with the patient while re-enforcing dietary sodium restrictions to no more than 2-3 g daily, DASH diet, life style modifications as well as the goal of maintaining ideal body weight with the patient today. I have advised the patient to avoid the use of over-the-counter medications/ supplements especially NSAIDS.\par \par I have reviewed with Mr. CRUZ that serious health consequences can occur when blood pressure is not well controlled and the need for strict compliance with medication and that optimal control can significantly reduce the risk of cardiovascular disease stroke, heart attack and other organ damage. They verbally expressed understanding of the fore mentioned serious health consequences to me today.\par \par BLOOD WORK:\par -New blood work was done 1/26/22 which demonstrated LDL of 65. \par -TSH level was increase during his last blood work at 5.08. I explained to him that this may be why he is feeling increased fatigue. He may have hypothyroid and he was instructed to follow up with his PCP in regards to further management. \par -The patient is tolerating his PCSK9 injectable therapy with Reptha.\par -We again have recommended he add Zetia 10 mg daily.  I do not believe his previous symptoms on Zetia related to the Zetia treatment. He states he will try this medication again but if his symptoms do not improve he will let the office know and he will hold it. \par \par PMH:\par The patient reports that the Zetia was making him feel groggy.  I have explained to him that I feel his gabapentin 3 times a day is more likely causing that side effect.\par His LDL cholesterol needs to be less than 70 mg/dL.\par The patient also complains of bilateral cramping in his calf muscles when walking.  He is no longer on Crestor 5 mg/day.\par \par CHOLESTEROL CONTROL:\par -Patient will continue the advised  TLC diet and to continue follow-up for treatment of hyperlipidemia and repeat blood testing with diet and exercise. I have discussed different exercises and the importance of maintenance of optimal body weight. The importance of staying within guidelines and recommendations was stressed to the patient today and they acknowledged that they understand this to me verbally.\par \par  -Mr. CRUZ was educated and advised that failure to follow-up with my medical recommendations to lower cholesterol can result in severe health consequences therefore, they will continuing a low saturated and low fat diet and to avoid excessive carbohydrates to help reduce triglycerides and that lowering LDL levels is associated with a significant decrease in serious cardiac events including but not limited to heart attack stroke and overall death. We will continue lipid lowering agents as advised based on blood test results and the patient understands to call if they develop severe muscle discomfort or if they have a reddish tinted discoloration in their urine.\par \par TESTING/REPORTS:\par -EKG done Apr 11, 2022 which demonstrated regular sinus rhythm with nonspecific ST-T wave changes, BPM of 72.\par \par -Electrocardiogram done November 3, 2021 demonstrate normal sinus rhythm rate 66 bpm is otherwise within normal limits.\par \par -EKG done Jan 5th 2021 which demonstrated regular sinus rhythm with nonspecific ST-T wave changes, BPM of 75.\par \par -Echocardiogram done on 10/5/2020 demonstrated mitral annular  calcification with moderate mitral regurgitation, thickened aortic valve, mild atrial enlargement, moderate tricuspid regurgitation and mild pulmonic regurgitation with normal left ventricular systolic function.\par \par PLAN:\par -Start Zetia 10mg  PO Daily for LDL goal of 70 or less.\par -The patient will schedule an Echo Doppler examination to evaluate murmur, left ventricular function, chamber size, and rule out hypertrophy.\par -He will follow up with his PCP in regards to his elevated TSH and I provided him a copy of his blood work to show his PCP. \par \par I have discussed the plan of care with Mr. ROSCOE CRUZ  and he  will follow up in 3 months. He is compliant with all of his medications.\par \par The patient understands that aerobic exercises must be increased to minutes 4 times/week and a detailed discussion of lifestyle modification was done today. \par The patient has a good understanding of the diagnosis, treatment plan and lifestyle modification. \par He will contact me at the office for any questions with their care or any changes in their health status.\par \par The plan of care was discussed with the patient with supervision physician Dr. Héctor Rea and will be carried out as noted above.\par \par Adelita ORR

## 2022-04-11 NOTE — DISCUSSION/SUMMARY
[FreeTextEntry1] : Dr. Rea-(PRIOR VISIT and PMH WITH Dr. Rea): \par This is a 81-year-old male with past medical history significant for coronary artery disease, status post stent to right coronary artery, hypertension, hyperlipidemia, statin intolerance, mitral valve regurgitation, who comes in for followup cardiac evaluation.  The patient does get occasional cramping in his legs in addition to his fatigue. \par \par Blood work done November 3, 2021 demonstrated direct LDL of 77 mg/dL with total cholesterol 162 mg/dL.  The patient's target LDL is less than 70 mg/dL.  I have recommended he add Zetia 10 mg daily.  I do not believe his previous symptoms on Zetia related to the Zetia treatment.\par He will have blood work done and 8 to 10 weeks.\par \par Electrocardiogram done November 3, 2021 demonstrate normal sinus rhythm rate 66 bpm is otherwise within normal limits.\par \par The patient discontinued his Micardis 40 mg/day therapy for hypertension due to fatigue.  He feels a little better off Micardis therapy.  However his blood pressure is elevated.  He will start Norvasc 5 mg daily.\par \par He is also reports that his insurance does not include Praluent as a preferred PCSK9 treatment and he may have to switch to Repatha therapy.\par \par He is tolerating Micardis 40 mg daily with better blood pressure.  He does complain of occasional fatigue and I have asked him to switch his Micardis to after dinner.  I feel that his fatigue is most likely secondary to his gabapentin.\par \par The patient is tolerating his PCSK9 injectable therapy with Praluent 75 mg every 2 weeks.  \par \par He is instructed to limit his salt intake and continue his daily aerobic activity.\par \par Electrocardiogram done May 18, 2021 demonstrate normal sinus rhythm rate 62 bpm and is otherwise unremarkable.\par \par PMH:\par The patient reports that the Zetia was making him feel groggy.  I have explained to him that I feel his gabapentin 3 times a day is more likely causing that side effect.\par His LDL cholesterol needs to be less than 70 mg/dL.\par The patient also complains of bilateral cramping in his calf muscles when walking.  He is no longer on Crestor 5 mg/day.  However given his risk profile, the patient will have an ankle-brachial index done today.\par \par He had recent venous ablation done January 7, 2020 by vascular surgery.  This was complicated by localized infection.  He will followup with his vascular surgeon.\par \par Lifestyle modification was reinforced.\par The patient was on Praluent because of statin intolerance.\par \par PMH:\par He had a cardiac catheterization done November 10, 2011 which revealed a 40% lesion in the left anterior descending artery, luminal irregularities in the distal left main artery and circumflex artery, 20% tubular stenosis at the site of the prior stent. Medical therapy was recommended at that time.\par He had a bare-metal stent placed in the proximal mid right coronary artery in February 2002.\par \par Ankle-brachial index done November 3, 2021 was nondiagnostic.\par \par The patient understands that aerobic exercises must be increased to 40 minutes 4 times per week. A detailed discussion of lifestyle modification was done today. The patient has a good understanding of the diagnosis, and treatment plan. Lifestyle modification was also outlined.

## 2022-07-12 ENCOUNTER — LABORATORY RESULT (OUTPATIENT)
Age: 82
End: 2022-07-12

## 2022-07-12 ENCOUNTER — APPOINTMENT (OUTPATIENT)
Dept: CARDIOLOGY | Facility: CLINIC | Age: 82
End: 2022-07-12

## 2022-07-12 VITALS
SYSTOLIC BLOOD PRESSURE: 125 MMHG | OXYGEN SATURATION: 98 % | HEIGHT: 69 IN | DIASTOLIC BLOOD PRESSURE: 78 MMHG | TEMPERATURE: 97.6 F | BODY MASS INDEX: 28.14 KG/M2 | WEIGHT: 190 LBS | RESPIRATION RATE: 14 BRPM | HEART RATE: 68 BPM

## 2022-07-12 PROCEDURE — 93306 TTE W/DOPPLER COMPLETE: CPT

## 2022-07-12 PROCEDURE — 99213 OFFICE O/P EST LOW 20 MIN: CPT

## 2022-07-12 NOTE — DISCUSSION/SUMMARY
[FreeTextEntry1] : This is a 82-year-old male with past medical history significant for coronary artery disease, status post stent to right coronary artery, hypertension, hyperlipidemia, statin intolerance, mitral valve regurgitation, who comes in for followup cardiac evaluation.  \par The patient is tolerating Repatha twice a month without side effects.  (He reports that a friend of his is on Praluent which may be cheaper) and he will look into the pricing.\par He will continue on his current dose of Zetia 10 mg daily.\par He will have new blood work done today for lipid profile.  He remains active, playing golf on a regular basis and exercising.\par His blood pressure is under good control.  He will follow-up with his primary care physician.\par Blood work done November 3, 2021 demonstrated direct LDL of 77 mg/dL with total cholesterol 162 mg/dL.  The patient's target LDL is less than 70 mg/dL.  I have recommended he add Zetia 10 mg daily.  I do not believe his previous symptoms on Zetia related to the Zetia treatment.\par He will have blood work done and 8 to 10 weeks.\par \par Electrocardiogram done November 3, 2021 demonstrate normal sinus rhythm rate 66 bpm is otherwise within normal limits.\par Ankle-brachial index done November 3, 2021 was nondiagnostic.\par The patient is tolerating his PCSK9 injectable therapy with Praluent 75 mg every 2 weeks.  \par He is instructed to limit his salt intake and continue his daily aerobic activity.\par Electrocardiogram done May 18, 2021 demonstrate normal sinus rhythm rate 62 bpm and is otherwise unremarkable.\par PMH:\par The patient reports that the Zetia was making him feel groggy.  I have explained to him that I feel his gabapentin 3 times a day is more likely causing that side effect.\par His LDL cholesterol needs to be less than 70 mg/dL.\par The patient also complains of bilateral cramping in his calf muscles when walking.  He is no longer on Crestor 5 mg/day.  However given his risk profile, the patient will have an ankle-brachial index done today.\par .He had recent venous ablation done January 7, 2020 by vascular surgery.  This was complicated by localized infection.  He will followup with his vascular surgeon.\par Lifestyle modification was reinforced.\par The patient was on Praluent because of statin intolerance.\par \par PMH:\par He had a cardiac catheterization done November 10, 2011 which revealed a 40% lesion in the left anterior descending artery, luminal irregularities in the distal left main artery and circumflex artery, 20% tubular stenosis at the site of the prior stent. Medical therapy was recommended at that time.\par He had a bare-metal stent placed in the proximal mid right coronary artery in February 2002.\par \par The patient understands that aerobic exercises must be increased to 40 minutes 4 times per week. A detailed discussion of lifestyle modification was done today. The patient has a good understanding of the diagnosis, and treatment plan. Lifestyle modification was also outlined.

## 2022-08-02 ENCOUNTER — APPOINTMENT (OUTPATIENT)
Dept: PULMONOLOGY | Facility: CLINIC | Age: 82
End: 2022-08-02

## 2022-08-02 ENCOUNTER — NON-APPOINTMENT (OUTPATIENT)
Age: 82
End: 2022-08-02

## 2022-08-02 VITALS
HEIGHT: 69 IN | DIASTOLIC BLOOD PRESSURE: 66 MMHG | TEMPERATURE: 97.2 F | HEART RATE: 78 BPM | RESPIRATION RATE: 16 BRPM | BODY MASS INDEX: 28.14 KG/M2 | SYSTOLIC BLOOD PRESSURE: 120 MMHG | OXYGEN SATURATION: 97 % | WEIGHT: 190 LBS

## 2022-08-02 PROCEDURE — 94010 BREATHING CAPACITY TEST: CPT

## 2022-08-02 PROCEDURE — 99214 OFFICE O/P EST MOD 30 MIN: CPT | Mod: 25

## 2022-08-02 PROCEDURE — 95012 NITRIC OXIDE EXP GAS DETER: CPT

## 2022-08-02 RX ORDER — GABAPENTIN 300 MG/1
300 CAPSULE ORAL
Qty: 90 | Refills: 0 | Status: ACTIVE | COMMUNITY
Start: 2022-04-18

## 2022-08-02 NOTE — HISTORY OF PRESENT ILLNESS
[FreeTextEntry1] : Mr. Hassan is a 82 year old male with a history of abnormal chest CT, allergic rhinitis, asthma, centrilobular emphysema, GERD, KARAN, and SOB presenting to the office today for a follow up visit. \par - he notes he has been doing well \par - he notes he has been playing golf twice a week, stretching 20 min every morning, and going to the pool twice a week doing exercises in the pool \par - he notes his lower back has been bothering him a lot, it tightens up \par - he notes his weight has been stable \par - he has been eating well \par - he denies any hoarseness \par - denies taking any new medications, vitamins, or supplements.\par - s/p COVID-19 vaccines x4 \par - He notes he sleeps well and he takes naps \par - \par -He denies any visual issues, headaches, nausea, vomiting, fever, chills, sweats, chest pains, chest pressure, diarrhea, constipation, dysphagia, myalgia, dizziness, leg swelling, leg pain, itchy eyes, itchy ears, heartburn, reflux, or sour taste in the mouth.

## 2022-08-02 NOTE — PHYSICAL EXAM
[No Acute Distress] : no acute distress [Normal Oropharynx] : normal oropharynx [III] : Mallampati Class: III [Normal Appearance] : normal appearance [No Neck Mass] : no neck mass [Normal Rate/Rhythm] : normal rate/rhythm [Normal S1, S2] : normal s1, s2 [No Murmurs] : no murmurs [No Resp Distress] : no resp distress [Clear to Auscultation Bilaterally] : clear to auscultation bilaterally [Benign] : benign [Normal Gait] : normal gait [No Clubbing] : no clubbing [No Cyanosis] : no cyanosis [No Edema] : no edema [FROM] : FROM [Normal Color/ Pigmentation] : normal color/ pigmentation [No Focal Deficits] : no focal deficits [Oriented x3] : oriented x3 [Normal Affect] : normal affect [TextBox_68] : I:E 1:3; Clear  [TextBox_80] : diffuse rash on upper chest right more than left

## 2022-08-02 NOTE — ASSESSMENT
[FreeTextEntry1] : Mr. Hassan is 82 year old Male who is stable from a pulmonary perspective, exercising and asymptomatic. He has a history of asthma, eosinophilia, COPD, abnormal CT, CAD, OSAS, RLS and neuropathy (feet). His main issues are neuropathy / back \par \par Problem 1: lung cancer screening (stable 11/20)\par -f/u chest CT  next 3/2023 \par -Lung Cancer Screening is recommended for people between the ages of 55 and 80 with prior 30+ pack year smoking histories. There is irrefutable evidence for realization of lung cancer screening based on two large randomized control trials demonstrating relative reduction in lung cancer mortality for patients undergoing low-dose CT scanning. Risks and benefits reviewed with the patient. \par \par Problem 2: asthma / COPD\par -Quiet off all mediations at this time\par -Asthma is believed to be caused by inherited (genetic) and environmental factor, but its exact cause is unknown. Asthma may be triggered by allergens, lung infections, or irritants in the air. Asthma triggers are different for each person \par -Inhaler technique reviewed as well as oral hygiene techniques reviewed with patient. Avoidance of cold air, extremes of temperature, rescue inhaler should be used before exercise. Order of medication reviewed with patient. Recommended use of a cool mist humidifier in the bedroom \par -COPD is a progressive disease and although it can’t be cured , appropriate management can slow its progression, reduce frequency and severity of exacerbations, and improve symptoms and the patient quality of life. Hospitalizations are the greatest contributor to the total COPD costs and account for up to 87% of total COPD related costs. Exacerbations are the main cause of admissions and subsequently account for the 40-75% of COPD costs. Inhaled maintenance therapy reduces the incidence of exacerbations in patients with stable COPD. Incorrect inhaler use and nonadherence are major obstacles to achieving COPD treatment goals. Many COPD patients have challenges (impaired inhalation, limited dexterity, reduced cognition: that limit their ability to correctly use their COPD treatment devices resulting in reduced symptom control. Of most importance is smoking cessation and early intervention with respiratory illnesses and contemplation for pulmonary rehab to enhance quality of life.\par \par Problem 2A: Eosinophilic Asthma\par -Candidate for Fasenra, Nucala, Dupixent\par - The safety and efficacy of Nucala was established in three double-blind , randomized, placebo controlled trials in patients with severe asthma. Compared to a placebo, patients with severe asthma receiving Nucala hada fever exacerbation requiring hospitalization and.or emergency department visits, and a longer time to first exacerbation. In addition, patients with severe asthma receiving Nucala or Fasenra experienced greater reductions in their daily maintenance oral corticosteroid dose,m while maintaining asthma control compared with patients receiving placebo. Treatment with Nucala did not result in a significant improvement in lung function as measured by the volume of air exhaled by patients in one second. The most common side effects include: headache, injection site reactions back pain, weakness and fatigue; hypersensitivity reactions can occur within hours or days including swelling of the face, mouth and tongue, fainting, dizziness, hives, breathing problems and rash; herpes zoster infections have occurred. The drug is a monoclonal antibody that inhibits interleukin-5 which helps regular eosinophils, a type of white blood cell that contributes to asthma. The over-prodcution of eosinophils can cause inflammation in the lungs, increasing the frequency of asthma attacks. Patients must also take other medications, including high dose inhaled corticosteroids and at least one additional asthma drug.\par \par Problem 2B: Rash c/w atopic dermatitis\par -s/p dermatology evaluation\par -Initiate Dupixent IF NEEDED \par -recommended Borage and Flax Seed Oil \par -Recommended to see Dr. Ruby (dermatology)\par \par Problem 2C: Cardiac \par -recommended cardiac eval with Dr. Héctor Rea \par \par Problem 3: GERD\par -continue Nexium 40 mg QAM\par \par -Rule of 2's- Avoid eating too much, too late, too poorly, too spicy, or two hours before bed \par -Things to avoid including overeating, spicy foods, tight clothing, eating within three hours of bed, this list is not all inclusive. \par -For treatment of reflux, possible options discussed including diet control, H2 blockers, PPIs, as well as coating motility agents discussed as treatment options. Timing of meals and proximity of last meal to sleep were discussed. If symptoms persist, a formal gastrointestinal evaluation is needed.\par \par Problem 4: Allergies/ sinus (quiet)\par -continue nasal saline PRN \par -recommended OTC antihistamines\par - get blood work: CBC and vitamin D level \par Environmental measures for allergies were encouraged including mattress and pillow cover, air purifier, and environmental controls.\par \par Problem 5: OSAS ([place]) \par -recommended to use "Chin Strap" \par -intolerable of other treatments - CPAP and dental device \par -Sleep guard recommended QD at night \par -continue to use Benadryl PRN \par -Recommended Oxy-aid\par -Discussed the risks/associations with coronary artery disease, atrial fibrillation, arrhythmia, memory loss, issues with concentration, stroke risk, hypertension, nocturia, chronic reflux/Interiano’s esophagus some but not all inclusive. Treatment options discussed including CPAP/BiPAP machine, oral appliance, ProVent therapy, Oxy-Aid by [employer], new technologies, or positional sleep. \par \par Problem 6: Fatigue\par -CoQ-10 200 recommended \par -continue supplemental vitamin D\par \par Problem 7: RLS\par -off medications at this time\par Restless Legs Syndrome (RLS), also known as Reyes- Ekbom Disease, is a common sleep -related movement disorder. About 1 in 10 adults in the [place] have problems from restless leg syndrome. It also can be seen in about 2% of children. Women are twice as likely as men to have RLS. People with RLS will have symptoms most often during times when they are less active, especially at bedtime. RLS most often causes an overwhelming urge to move your legs and sometimes other parts of your body. This urge is associated with unpleasant sensations in different parts of the body. The symptoms can be mild to severe and can affect your ability to go to sleep and stay asleep. People with RLS often sleep less at night and feel more tired during the day. \par \par Problem 8: Neuropathy\par -Continue Neurontin 100 QHS\par -recommended Neuro Renew\par -recommended B complex Vitamin \par -recommended P6 cream\par -recommended L Glutamine 500 mg BID \par -recommended to use Alpha- Lipoic Acid 500 mg BID\par \par Problem 9: health maintenance\par -recommended [employer] stretches for the back \par -s/p COVID Vaccine Moderna x 4 - not recommending any more boosters at the current time\par -s/p influenza vaccine 2021\par -recommended strep pneumonia vaccines: Prevnar-13 vaccine, followed by Pneumo vaccine 23 on year following (completed) \par -recommended early intervention for URIs\par -recommended osteoporosis evaluations\par -recommended early dermatological evaluations\par -recommended after the age of 50 to the age of 70, colonoscopy every 5 years\par -encouraged early intervention\par \par \par F/u in 6 months - SPI \par pt is encouraged to call or fax the office with any questions or concerns.

## 2022-08-02 NOTE — PROCEDURE
[FreeTextEntry1] : CT (MAR.25.2022) IMPRESSION:\par Stable findings since November 2020.\par \par \par FENO was 16; normal value being less than 25\par Fractional exhaled nitric oxide (FENO) is regarded as a simple, noninvasive method for assessing eosinophilic airway inflammation. Produced by a variety of cells within the lung, nitric oxide (NO) concentrations are generally low in healthy individuals. However, high concentrations of NO appear to be involved in nonspecific host defense mechanisms and chronic inflammatory diseases such as asthma. The American Thoracic Society (ATS) therefore has recommended using FENO to aid in the diagnosis and monitoring of eosinophilic airway inflammation and asthma, and for identifying steroid responsive individuals whose chronic respiratory symptoms may be airway inflammation. \par \par PFT reveals normal flows, with an FEV1 of   2.46L, which is  94% of predicted, with normal flow volume loop

## 2022-08-02 NOTE — ADDENDUM
[FreeTextEntry1] : Documented by Renita Reyez acting as a scribe for Dr. Raymundo Rai on 08/02/2022 \par \par All medical record entries made by the Scribe were at my, Dr. Raymundo Rai's, direction and personally dictated by me on 08/02/2022 . I have reviewed the chart and agree that the record accurately reflects my personal performance of the history, physical exam, assessment and plan. I have also personally directed, reviewed, and agree with the discharge instructions.

## 2022-08-16 ENCOUNTER — NON-APPOINTMENT (OUTPATIENT)
Age: 82
End: 2022-08-16

## 2022-09-19 ENCOUNTER — EMERGENCY (EMERGENCY)
Facility: HOSPITAL | Age: 82
LOS: 1 days | Discharge: HOME | End: 2022-09-19
Attending: EMERGENCY MEDICINE | Admitting: EMERGENCY MEDICINE
Payer: MEDICARE

## 2022-09-19 VITALS
HEIGHT: 69 IN | WEIGHT: 190.04 LBS | HEART RATE: 82 BPM | SYSTOLIC BLOOD PRESSURE: 143 MMHG | DIASTOLIC BLOOD PRESSURE: 82 MMHG | RESPIRATION RATE: 15 BRPM | OXYGEN SATURATION: 96 % | TEMPERATURE: 98 F

## 2022-09-19 DIAGNOSIS — Z98.890 OTHER SPECIFIED POSTPROCEDURAL STATES: Chronic | ICD-10-CM

## 2022-09-19 DIAGNOSIS — Z98.61 CORONARY ANGIOPLASTY STATUS: Chronic | ICD-10-CM

## 2022-09-19 LAB
ALBUMIN SERPL ELPH-MCNC: 3.2 G/DL — LOW (ref 3.3–5)
ALP SERPL-CCNC: 78 U/L — SIGNIFICANT CHANGE UP (ref 40–120)
ALT FLD-CCNC: 23 U/L — SIGNIFICANT CHANGE UP (ref 12–78)
ANION GAP SERPL CALC-SCNC: 7 MMOL/L — SIGNIFICANT CHANGE UP (ref 5–17)
APPEARANCE UR: ABNORMAL
AST SERPL-CCNC: 14 U/L — LOW (ref 15–37)
BASOPHILS # BLD AUTO: 0.03 K/UL — SIGNIFICANT CHANGE UP (ref 0–0.2)
BASOPHILS NFR BLD AUTO: 0.2 % — SIGNIFICANT CHANGE UP (ref 0–2)
BILIRUB SERPL-MCNC: 0.6 MG/DL — SIGNIFICANT CHANGE UP (ref 0.2–1.2)
BILIRUB UR-MCNC: NEGATIVE — SIGNIFICANT CHANGE UP
BUN SERPL-MCNC: 20 MG/DL — SIGNIFICANT CHANGE UP (ref 7–23)
CALCIUM SERPL-MCNC: 8 MG/DL — LOW (ref 8.5–10.1)
CHLORIDE SERPL-SCNC: 112 MMOL/L — HIGH (ref 96–108)
CO2 SERPL-SCNC: 24 MMOL/L — SIGNIFICANT CHANGE UP (ref 22–31)
COLOR SPEC: YELLOW — SIGNIFICANT CHANGE UP
CREAT SERPL-MCNC: 1.1 MG/DL — SIGNIFICANT CHANGE UP (ref 0.5–1.3)
DIFF PNL FLD: ABNORMAL
EGFR: 68 ML/MIN/1.73M2 — SIGNIFICANT CHANGE UP
EOSINOPHIL # BLD AUTO: 1.23 K/UL — HIGH (ref 0–0.5)
EOSINOPHIL NFR BLD AUTO: 9.5 % — HIGH (ref 0–6)
GLUCOSE SERPL-MCNC: 100 MG/DL — HIGH (ref 70–99)
GLUCOSE UR QL: NEGATIVE — SIGNIFICANT CHANGE UP
HCT VFR BLD CALC: 41.7 % — SIGNIFICANT CHANGE UP (ref 39–50)
HGB BLD-MCNC: 13.8 G/DL — SIGNIFICANT CHANGE UP (ref 13–17)
IMM GRANULOCYTES NFR BLD AUTO: 0.5 % — SIGNIFICANT CHANGE UP (ref 0–0.9)
KETONES UR-MCNC: ABNORMAL
LACTATE SERPL-SCNC: 0.5 MMOL/L — LOW (ref 0.7–2)
LEUKOCYTE ESTERASE UR-ACNC: ABNORMAL
LIDOCAIN IGE QN: 110 U/L — SIGNIFICANT CHANGE UP (ref 73–393)
LYMPHOCYTES # BLD AUTO: 1.39 K/UL — SIGNIFICANT CHANGE UP (ref 1–3.3)
LYMPHOCYTES # BLD AUTO: 10.7 % — LOW (ref 13–44)
MCHC RBC-ENTMCNC: 31.4 PG — SIGNIFICANT CHANGE UP (ref 27–34)
MCHC RBC-ENTMCNC: 33.1 GM/DL — SIGNIFICANT CHANGE UP (ref 32–36)
MCV RBC AUTO: 94.8 FL — SIGNIFICANT CHANGE UP (ref 80–100)
MONOCYTES # BLD AUTO: 1.28 K/UL — HIGH (ref 0–0.9)
MONOCYTES NFR BLD AUTO: 9.9 % — SIGNIFICANT CHANGE UP (ref 2–14)
NEUTROPHILS # BLD AUTO: 8.96 K/UL — HIGH (ref 1.8–7.4)
NEUTROPHILS NFR BLD AUTO: 69.2 % — SIGNIFICANT CHANGE UP (ref 43–77)
NITRITE UR-MCNC: POSITIVE
NRBC # BLD: 0 /100 WBCS — SIGNIFICANT CHANGE UP (ref 0–0)
PH UR: 5 — SIGNIFICANT CHANGE UP (ref 5–8)
PLATELET # BLD AUTO: 246 K/UL — SIGNIFICANT CHANGE UP (ref 150–400)
POTASSIUM SERPL-MCNC: 3.5 MMOL/L — SIGNIFICANT CHANGE UP (ref 3.5–5.3)
POTASSIUM SERPL-SCNC: 3.5 MMOL/L — SIGNIFICANT CHANGE UP (ref 3.5–5.3)
PROT SERPL-MCNC: 6.6 G/DL — SIGNIFICANT CHANGE UP (ref 6–8.3)
PROT UR-MCNC: 30 MG/DL
RBC # BLD: 4.4 M/UL — SIGNIFICANT CHANGE UP (ref 4.2–5.8)
RBC # FLD: 12.8 % — SIGNIFICANT CHANGE UP (ref 10.3–14.5)
SODIUM SERPL-SCNC: 143 MMOL/L — SIGNIFICANT CHANGE UP (ref 135–145)
SP GR SPEC: 1.02 — SIGNIFICANT CHANGE UP (ref 1.01–1.02)
UROBILINOGEN FLD QL: NEGATIVE — SIGNIFICANT CHANGE UP
WBC # BLD: 12.95 K/UL — HIGH (ref 3.8–10.5)
WBC # FLD AUTO: 12.95 K/UL — HIGH (ref 3.8–10.5)

## 2022-09-19 PROCEDURE — 74177 CT ABD & PELVIS W/CONTRAST: CPT | Mod: 26,MA

## 2022-09-19 PROCEDURE — 83605 ASSAY OF LACTIC ACID: CPT

## 2022-09-19 PROCEDURE — 81001 URINALYSIS AUTO W/SCOPE: CPT

## 2022-09-19 PROCEDURE — 36415 COLL VENOUS BLD VENIPUNCTURE: CPT

## 2022-09-19 PROCEDURE — 85025 COMPLETE CBC W/AUTO DIFF WBC: CPT

## 2022-09-19 PROCEDURE — 83690 ASSAY OF LIPASE: CPT

## 2022-09-19 PROCEDURE — 80053 COMPREHEN METABOLIC PANEL: CPT

## 2022-09-19 PROCEDURE — 87086 URINE CULTURE/COLONY COUNT: CPT

## 2022-09-19 PROCEDURE — 99284 EMERGENCY DEPT VISIT MOD MDM: CPT | Mod: 25

## 2022-09-19 PROCEDURE — 87186 SC STD MICRODIL/AGAR DIL: CPT

## 2022-09-19 PROCEDURE — 74177 CT ABD & PELVIS W/CONTRAST: CPT | Mod: MA

## 2022-09-19 PROCEDURE — 99284 EMERGENCY DEPT VISIT MOD MDM: CPT | Mod: FS

## 2022-09-19 RX ORDER — SODIUM CHLORIDE 9 MG/ML
1000 INJECTION INTRAMUSCULAR; INTRAVENOUS; SUBCUTANEOUS ONCE
Refills: 0 | Status: COMPLETED | OUTPATIENT
Start: 2022-09-19 | End: 2022-09-19

## 2022-09-19 RX ADMIN — SODIUM CHLORIDE 1000 MILLILITER(S): 9 INJECTION INTRAMUSCULAR; INTRAVENOUS; SUBCUTANEOUS at 13:25

## 2022-09-19 RX ADMIN — Medication 1 TABLET(S): at 18:23

## 2022-09-19 NOTE — ED PROVIDER NOTE - PROGRESS NOTE DETAILS
+ uti and possible bladder clot vs mass no diarrhea in er specimen collection ordered  case dw Dr. Craig advised change abx to augmentin and f/u

## 2022-09-19 NOTE — ED PROVIDER NOTE - CARE PROVIDER_API CALL
Moe Craig)  Urology  [address] [facility] [address], [address]  [place] [address]  Phone: [phone]  Fax: [phone]  Follow Up Time:

## 2022-09-19 NOTE — ED PROVIDER NOTE - OBJECTIVE STATEMENT
Patient is a 82-year-old male with BPH neurogenic bladder self catheterizes daily CAD history of stents hyperlipidemia hypertension here for 3 days of abdominal pain and watery diarrhea patient states he has had about 8-9 episodes since yesterday.  Patient states he is also increased the frequency of his self-catheterization the last few days patient recently completed a course of antibiotics for UTI culture was negative on September 12.  Pt feels like urine flow has decreased. US done and told not in retention. Patient denies any fever chills nausea or vomiting patient denies taking anything for pain.  Patient was sent into ER by urologist Dr. Craig.    PCP Melanie Patient is a 82-year-old male with BPH neurogenic bladder self catheterizes daily CAD history of stents hyperlipidemia hypertension here for 3 days of abdominal pain and watery diarrhea patient states he has had about 8-9 episodes since yesterday.  Patient states he is also increased the frequency of his self-catheterization the last few days patient recently completed a course of antibiotics ceftin for UTI culture was negative on September 12.  Pt feels like urine flow has decreased. US done and told not in retention. Patient denies any fever chills nausea or vomiting patient denies taking anything for pain.  Patient was sent into ER by urologist Dr. Craig.    PCP Melanie

## 2022-09-19 NOTE — ED PROVIDER NOTE - NSFOLLOWUPINSTRUCTIONS_ED_ALL_ED_FT
Follow up with urology  return to er for any worsening symptoms       URINARY TRACT INFECTION IN MEN - AfterCare(R) Instructions(ER/ED)           Urinary Tract Infection in Men    WHAT YOU NEED TO KNOW:    A urinary tract infection (UTI) is caused by bacteria that get inside your urinary tract. Your urinary tract includes your kidneys, ureters, bladder, and urethra. A UTI is more common in your lower urinary tract, which includes your bladder and urethra.           DISCHARGE INSTRUCTIONS:    Return to the emergency department if:   •You are urinating very little or not at all.      •You have a high fever with shaking chills.      •You have side or back pain that gets worse.      Call your doctor if:   •You have a fever.      •You do not feel better after 2 days of taking antibiotics.      •You are vomiting.      •You have new symptoms, such as blood or pus in your urine.      •You have questions or concerns about your condition or care.      Medicines:   •Antibiotics treat a bacterial infection.      •Medicines may be given to decrease pain and burning when you urinate. They will also help decrease the feeling that you need to urinate often. These medicines may make your urine orange or red.      •Take your medicine as directed. Contact your healthcare provider if you think your medicine is not helping or if you have side effects. Tell your provider if you are allergic to any medicine. Keep a list of the medicines, vitamins, and herbs you take. Include the amounts, and when and why you take them. Bring the list or the pill bottles to follow-up visits. Carry your medicine list with you in case of an emergency.      Prevent another UTI:   •Empty your bladder often. Urinate and empty your bladder as soon as you feel the need. Do not hold your urine for long periods of time.      •Drink liquids as directed. Ask how much liquid to drink each day and which liquids are best for you. You may need to drink more liquids than usual to help flush out the bacteria. Do not drink alcohol, caffeine, or citrus juices. These can irritate your bladder and increase your symptoms. Your healthcare provider may recommend cranberry juice to help prevent a UTI.      •Urinate after you have sex. This can help flush out bacteria passed during sex.      •Do pelvic muscle exercises often. Pelvic muscle exercises may help you start and stop urinating. Strong pelvic muscles may help you empty your bladder easier. Squeeze these muscles tightly for 5 seconds like you are trying to hold back urine. Then relax for 5 seconds. Gradually work up to squeezing for 10 seconds. Do 3 sets of 15 repetitions a day, or as directed.      Follow up with your doctor as directed: Write down your questions so you remember to ask them during your visits.       © Copyright [employer] 2022           back to top                          © Copyright [employer] 2022

## 2022-09-19 NOTE — ED PROVIDER NOTE - NS ED ATTENDING STATEMENT MOD
This was a shared visit with the EDER. I reviewed and verified the documentation and independently performed the documented:

## 2022-09-19 NOTE — ED PROVIDER NOTE - PATIENT PORTAL LINK FT
You can access the FollowMyHealth Patient Portal offered by [facility] by registering at the following website: [email or web address]. By joining [employer]’s FollowMyHealth portal, you will also be able to view your health information using other applications (apps) compatible with our system.

## 2022-09-19 NOTE — ED ADULT NURSE NOTE - CHPI ED NUR SYMPTOMS NEG
no blood in stool/no burning urination/no chills/no diarrhea/no fever/no hematuria/no nausea/no vomiting

## 2022-09-19 NOTE — ED PROVIDER NOTE - CLINICAL SUMMARY MEDICAL DECISION MAKING FREE TEXT BOX
Patient is an 82-year-old male who presents to the chief complaint of abdominal pain.  Past medical history of BPH, neurogenic bladder self catheterizes, CAD status post stents, hyperlipidemia, hypertension.  Patient reports that for the last 3 days he has been experiencing mid abdominal pain sharp and stabbing in nature with 7-8 episodes of diarrhea a day nonbloody.  Patient also noted increased frequency of self catheterizations over the last few days and did recently complete a course of antibiotics for UTI he had a negative culture on September 12.  Denies fevers chills nausea vomiting chest pain shortness of breath but does endorse decreased appetite.  He was sent to the ER by his urologist Dr. Craig.  On exam patient is lying in bed no acute distress normocephalic atraumatic pupils are equal round and reactive heart is regular rate lungs are clear to auscultation abdomen is soft with no reproducible tenderness to palpation on exam.  Patient's presenting to the emergency room with a chief complaint of abdominal pain multiple episodes of diarrhea.  Concern for intra-abdominal process possible colitis versus diverticulitis given recent history of antibiotic use C. difficile must also be on the differential.  Will obtain screening labs hydrate obtain CT abdomen pelvis and monitor.

## 2022-09-21 LAB
-  AMIKACIN: SIGNIFICANT CHANGE UP
-  AZTREONAM: SIGNIFICANT CHANGE UP
-  CEFEPIME: SIGNIFICANT CHANGE UP
-  CEFTAZIDIME: SIGNIFICANT CHANGE UP
-  CIPROFLOXACIN: SIGNIFICANT CHANGE UP
-  GENTAMICIN: SIGNIFICANT CHANGE UP
-  IMIPENEM: SIGNIFICANT CHANGE UP
-  LEVOFLOXACIN: SIGNIFICANT CHANGE UP
-  MEROPENEM: SIGNIFICANT CHANGE UP
-  PIPERACILLIN/TAZOBACTAM: SIGNIFICANT CHANGE UP
-  TOBRAMYCIN: SIGNIFICANT CHANGE UP
CULTURE RESULTS: SIGNIFICANT CHANGE UP
METHOD TYPE: SIGNIFICANT CHANGE UP
ORGANISM # SPEC MICROSCOPIC CNT: SIGNIFICANT CHANGE UP
ORGANISM # SPEC MICROSCOPIC CNT: SIGNIFICANT CHANGE UP
SPECIMEN SOURCE: SIGNIFICANT CHANGE UP

## 2022-10-07 ENCOUNTER — LABORATORY RESULT (OUTPATIENT)
Age: 82
End: 2022-10-07

## 2022-10-07 ENCOUNTER — NON-APPOINTMENT (OUTPATIENT)
Age: 82
End: 2022-10-07

## 2022-10-07 ENCOUNTER — APPOINTMENT (OUTPATIENT)
Dept: CARDIOLOGY | Facility: CLINIC | Age: 82
End: 2022-10-07

## 2022-10-07 VITALS
SYSTOLIC BLOOD PRESSURE: 126 MMHG | TEMPERATURE: 97.6 F | WEIGHT: 184 LBS | DIASTOLIC BLOOD PRESSURE: 82 MMHG | BODY MASS INDEX: 27.25 KG/M2 | HEIGHT: 69 IN | HEART RATE: 61 BPM | RESPIRATION RATE: 16 BRPM | OXYGEN SATURATION: 97 %

## 2022-10-07 PROCEDURE — 93880 EXTRACRANIAL BILAT STUDY: CPT

## 2022-10-07 PROCEDURE — 93000 ELECTROCARDIOGRAM COMPLETE: CPT

## 2022-10-07 PROCEDURE — 99214 OFFICE O/P EST MOD 30 MIN: CPT

## 2022-10-07 NOTE — DISCUSSION/SUMMARY
[FreeTextEntry1] : This is a 82-year-old male with past medical history significant for coronary artery disease, status post stent to right coronary artery, hypertension, hyperlipidemia, statin intolerance, mitral valve regurgitation, who comes in for followup cardiac evaluation.  \par Electrocardiogram done October 7, 2022 demonstrated normal sinus rhythm rate of 61 bpm is otherwise unremarkable.\par Echo Doppler examination done July 17, 2022 demonstrated mild posterior mitral valve leaflet prolapse with moderate mitral valve regurgitation, minimal aortic valve regurgitation, mild tricuspid valve regurgitation, mild pulmonic valve regurgitation.\par The patient will continue on his usual medications.  He will have new blood work done today for electrolytes and lipid panel.\par He continues Repatha.  He finds that the price is becoming more expensive and I have asked him to call the company to see if there is any dmitry program that he may participate with.\par He will continue on his current dose of Zetia 10 mg daily.\par \par His blood pressure is under good control.  He will follow-up with his primary care physician.\par Blood work done November 3, 2021 demonstrated direct LDL of 77 mg/dL with total cholesterol 162 mg/dL.  The patient's target LDL is less than 70 mg/dL.  I have recommended he add Zetia 10 mg daily.  I do not believe his previous symptoms on Zetia related to the Zetia treatment.\par He will have blood work done and 8 to 10 weeks.\par \par Electrocardiogram done November 3, 2021 demonstrate normal sinus rhythm rate 66 bpm is otherwise within normal limits.\par Electrocardiogram done May 18, 2021 demonstrate normal sinus rhythm rate 62 bpm and is otherwise unremarkable.\par PMH:\par The patient reports that the Zetia was making him feel groggy.  I have explained to him that I feel his gabapentin 3 times a day is more likely causing that side effect.\par His LDL cholesterol needs to be less than 70 mg/dL.\par The patient also complains of bilateral cramping in his calf muscles when walking.  He is no longer on Crestor 5 mg/day.  However given his risk profile, the patient will have an ankle-brachial index done today.\par .He had recent venous ablation done January 7, 2020 by vascular surgery.  This was complicated by localized infection.  He will followup with his vascular surgeon.\par Lifestyle modification was reinforced.\par The patient was on Praluent because of statin intolerance.\par \par PMH:\par He had a cardiac catheterization done November 10, 2011 which revealed a 40% lesion in the left anterior descending artery, luminal irregularities in the distal left main artery and circumflex artery, 20% tubular stenosis at the site of the prior stent. Medical therapy was recommended at that time.\par He had a bare-metal stent placed in the proximal mid right coronary artery in February 2002.\par \par The patient understands that aerobic exercises must be increased to 40 minutes 4 times per week. A detailed discussion of lifestyle modification was done today. The patient has a good understanding of the diagnosis, and treatment plan. Lifestyle modification was also outlined.

## 2022-10-07 NOTE — PHYSICAL EXAM
[General Appearance - Well Developed] : well developed [Normal Appearance] : normal appearance [Well Groomed] : well groomed [General Appearance - Well Nourished] : well nourished [No Deformities] : no deformities [General Appearance - In No Acute Distress] : no acute distress [Normal Conjunctiva] : the conjunctiva exhibited no abnormalities [Normal Oral Mucosa] : normal oral mucosa [No Oral Pallor] : no oral pallor [Normal Oropharynx] : normal oropharynx [Normal Jugular Venous A Waves Present] : normal jugular venous A waves present [Normal Jugular Venous V Waves Present] : normal jugular venous V waves present [No Jugular Venous Jewell A Waves] : no jugular venous jewell A waves [Respiration, Rhythm And Depth] : normal respiratory rhythm and effort [Exaggerated Use Of Accessory Muscles For Inspiration] : no accessory muscle use [Auscultation Breath Sounds / Voice Sounds] : lungs were clear to auscultation bilaterally [Bowel Sounds] : normal bowel sounds [Abdomen Soft] : soft [Abdomen Tenderness] : non-tender [Abnormal Walk] : normal gait [Nail Clubbing] : no clubbing of the fingernails [Cyanosis, Localized] : no localized cyanosis [Petechial Hemorrhages (___cm)] : no petechial hemorrhages [Skin Color & Pigmentation] : normal skin color and pigmentation [Skin Turgor] : normal skin turgor [] : no rash [No Venous Stasis] : no venous stasis [Oriented To Time, Place, And Person] : oriented to person, place, and time [Impaired Insight] : insight and judgment were intact [Affect] : the affect was normal [No Anxiety] : not feeling anxious [5th Left ICS - MCL] : palpated at the 5th LICS in the midclavicular line [Normal] : normal [No Precordial Heave] : no precordial heave was noted [Normal Rate] : normal [Rhythm Regular] : regular [Normal S1] : normal S1 [Normal S2] : normal S2 [No Gallop] : no gallop heard [No Murmur] : no murmurs heard [II] : a grade 2 [I] : a grade 1 [2+] : left 2+ [No Abnormalities] : the abdominal aorta was not enlarged and no bruit was heard [No Pitting Edema] : no pitting edema present [Rt] : varicose veins of the right leg noted [Lt] : varicose veins of the left leg noted [Carotid Bruit] : carotid bruit [de-identified] : left side possible bruit or transmitted murmur. [Apical Thrill] : no thrill palpable at the apex [S3] : no S3 [S4] : no S4 [Click] : no click [Pericardial Rub] : no pericardial rub [Right Carotid Bruit] : no bruit heard over the right carotid [Left Carotid Bruit] : no bruit heard over the left carotid [Right Femoral Bruit] : no bruit heard over the right femoral artery [Left Femoral Bruit] : no bruit heard over the left femoral artery

## 2022-10-11 RX ORDER — BETAMETHASONE DIPROPIONATE 0.5 MG/G
0.05 CREAM, AUGMENTED TOPICAL
Qty: 50 | Refills: 0 | Status: COMPLETED | COMMUNITY
Start: 2022-02-22 | End: 2022-10-11

## 2022-10-11 RX ORDER — GABAPENTIN 600 MG/1
600 TABLET, COATED ORAL
Refills: 0 | Status: COMPLETED | COMMUNITY
Start: 2019-05-15 | End: 2022-10-11

## 2022-10-11 RX ORDER — DOXYCYCLINE HYCLATE 100 MG/1
100 CAPSULE ORAL
Qty: 14 | Refills: 0 | Status: COMPLETED | COMMUNITY
Start: 2022-05-06 | End: 2022-10-11

## 2023-03-13 ENCOUNTER — NON-APPOINTMENT (OUTPATIENT)
Age: 83
End: 2023-03-13

## 2023-03-13 ENCOUNTER — APPOINTMENT (OUTPATIENT)
Dept: CARDIOLOGY | Facility: CLINIC | Age: 83
End: 2023-03-13
Payer: MEDICARE

## 2023-03-13 VITALS
BODY MASS INDEX: 28.14 KG/M2 | OXYGEN SATURATION: 97 % | RESPIRATION RATE: 16 BRPM | WEIGHT: 190 LBS | HEIGHT: 69 IN | HEART RATE: 65 BPM | DIASTOLIC BLOOD PRESSURE: 84 MMHG | TEMPERATURE: 97.6 F | SYSTOLIC BLOOD PRESSURE: 144 MMHG

## 2023-03-13 DIAGNOSIS — R06.09 OTHER FORMS OF DYSPNEA: ICD-10-CM

## 2023-03-13 DIAGNOSIS — R09.89 OTHER SPECIFIED SYMPTOMS AND SIGNS INVOLVING THE CIRCULATORY AND RESPIRATORY SYSTEMS: ICD-10-CM

## 2023-03-13 PROCEDURE — 99215 OFFICE O/P EST HI 40 MIN: CPT

## 2023-03-13 PROCEDURE — 93000 ELECTROCARDIOGRAM COMPLETE: CPT

## 2023-03-13 NOTE — DISCUSSION/SUMMARY
[FreeTextEntry1] : This is a 82-year-old male with past medical history significant for coronary artery disease, status post stent to right coronary artery, hypertension, hyperlipidemia, statin intolerance, mitral valve regurgitation, who comes in for followup cardiac evaluation.\par He denies chest pain, palpitations, dizziness or syncope.  He may get occasional dyspnea on exertion.  He is concerned about stent restenosis of his stents.  The symptoms may be related to his blood pressure response to exercise, reduced aerobic capacity.\par He will schedule a pharmacologic nuclear stress test to evaluate his coronary artery perfusion..\par The patient discontinued his own amlodipine about 2 months ago "tomorrow to be on a lot of pills".\par He also did not get a renewal on his PCSK9 injectable therapy which has been off of for approximately a month.\par He understands he must renew his Repatha and start taking it again every 2 weeks.\par Electrocardiogram done March 13, 2023 demonstrated normal sinus rhythm rate 65 bpm is otherwise remarkable for poor baseline with artifact.\par I have asked him to follow-up in 6 weeks to recheck his blood pressure.  He reports his blood pressure is within normal limits and on the doctor's offices.\par Lipid panel done October 8, 2022 demonstrated a total cholesterol 164, HDL 58, LDL direct of 87 mg/dL non-HDL cholesterol 106 mg/dL with triglycerides 105 mg/dL.  This will also be repeated at the time of next visit when he really starts Repatha therapy.\par Electrocardiogram done October 7, 2022 demonstrated normal sinus rhythm rate of 61 bpm is otherwise unremarkable.\par Echo Doppler examination done July 17, 2022 demonstrated mild posterior mitral valve leaflet prolapse with moderate mitral valve regurgitation, minimal aortic valve regurgitation, mild tricuspid valve regurgitation, mild pulmonic valve regurgitation.\par The patient will continue on his usual medications.  He will have new blood work done today for electrolytes and lipid panel.\par He will continue on his current dose of Zetia 10 mg daily.\par Blood work done November 3, 2021 demonstrated direct LDL of 77 mg/dL with total cholesterol 162 mg/dL.  The patient's target LDL is less than 70 mg/dL.  I have recommended he add Zetia 10 mg daily.  I do not believe his previous symptoms on Zetia related to the Zetia treatment.\par He will have blood work done and 8 to 10 weeks.\par \par Electrocardiogram done November 3, 2021 demonstrate normal sinus rhythm rate 66 bpm is otherwise within normal limits.\par Electrocardiogram done May 18, 2021 demonstrate normal sinus rhythm rate 62 bpm and is otherwise unremarkable.\par PMH:\par The patient reports that the Zetia was making him feel groggy.  I have explained to him that I feel his gabapentin 3 times a day is more likely causing that side effect.\par His LDL cholesterol needs to be less than 70 mg/dL.\par The patient also complains of bilateral cramping in his calf muscles when walking.  He is no longer on Crestor 5 mg/day.  However given his risk profile, the patient will have an ankle-brachial index done today.\par .He had recent venous ablation done January 7, 2020 by vascular surgery.  This was complicated by localized infection.  He will followup with his vascular surgeon.\par Lifestyle modification was reinforced.\par The patient was on Praluent because of statin intolerance.\par \par PMH:\par He had a cardiac catheterization done November 10, 2011 which revealed a 40% lesion in the left anterior descending artery, luminal irregularities in the distal left main artery and circumflex artery, 20% tubular stenosis at the site of the prior stent. Medical therapy was recommended at that time.\par He had a bare-metal stent placed in the proximal mid right coronary artery in February 2002.\par \par The patient understands that aerobic exercises must be increased to 40 minutes 4 times per week. A detailed discussion of lifestyle modification was done today. The patient has a good understanding of the diagnosis, and treatment plan. Lifestyle modification was also outlined.

## 2023-03-13 NOTE — PHYSICAL EXAM
[Carotid Bruit] : carotid bruit [General Appearance - Well Developed] : well developed [Normal Appearance] : normal appearance [Well Groomed] : well groomed [General Appearance - Well Nourished] : well nourished [No Deformities] : no deformities [General Appearance - In No Acute Distress] : no acute distress [Normal Conjunctiva] : the conjunctiva exhibited no abnormalities [Normal Oral Mucosa] : normal oral mucosa [No Oral Pallor] : no oral pallor [Normal Oropharynx] : normal oropharynx [Normal Jugular Venous A Waves Present] : normal jugular venous A waves present [Normal Jugular Venous V Waves Present] : normal jugular venous V waves present [No Jugular Venous Jewell A Waves] : no jugular venous jewell A waves [Respiration, Rhythm And Depth] : normal respiratory rhythm and effort [Exaggerated Use Of Accessory Muscles For Inspiration] : no accessory muscle use [Auscultation Breath Sounds / Voice Sounds] : lungs were clear to auscultation bilaterally [Bowel Sounds] : normal bowel sounds [Abdomen Soft] : soft [Abdomen Tenderness] : non-tender [Abnormal Walk] : normal gait [Cyanosis, Localized] : no localized cyanosis [Nail Clubbing] : no clubbing of the fingernails [Petechial Hemorrhages (___cm)] : no petechial hemorrhages [Skin Color & Pigmentation] : normal skin color and pigmentation [Skin Turgor] : normal skin turgor [] : no rash [No Venous Stasis] : no venous stasis [Oriented To Time, Place, And Person] : oriented to person, place, and time [Impaired Insight] : insight and judgment were intact [Affect] : the affect was normal [No Anxiety] : not feeling anxious [5th Left ICS - MCL] : palpated at the 5th LICS in the midclavicular line [Normal] : normal [No Precordial Heave] : no precordial heave was noted [Normal Rate] : normal [Rhythm Regular] : regular [Normal S1] : normal S1 [Normal S2] : normal S2 [No Gallop] : no gallop heard [No Murmur] : no murmurs heard [II] : a grade 2 [I] : a grade 1 [2+] : left 2+ [No Abnormalities] : the abdominal aorta was not enlarged and no bruit was heard [No Pitting Edema] : no pitting edema present [Rt] : varicose veins of the right leg noted [Lt] : varicose veins of the left leg noted [de-identified] : left side possible bruit or transmitted murmur. [Apical Thrill] : no thrill palpable at the apex [S3] : no S3 [S4] : no S4 [Click] : no click [Pericardial Rub] : no pericardial rub [Right Carotid Bruit] : no bruit heard over the right carotid [Left Carotid Bruit] : no bruit heard over the left carotid [Right Femoral Bruit] : no bruit heard over the right femoral artery [Left Femoral Bruit] : no bruit heard over the left femoral artery

## 2023-03-23 RX ORDER — AMLODIPINE BESYLATE 5 MG/1
5 TABLET ORAL DAILY
Qty: 90 | Refills: 3 | Status: DISCONTINUED | COMMUNITY
Start: 2021-11-03 | End: 2023-03-23

## 2023-03-23 RX ORDER — HYDROCORTISONE 25 MG/G
2.5 CREAM TOPICAL
Qty: 60 | Refills: 0 | Status: COMPLETED | COMMUNITY
Start: 2022-02-22 | End: 2023-03-23

## 2023-03-23 RX ORDER — NYSTATIN 100000 [USP'U]/G
100000 CREAM TOPICAL
Qty: 30 | Refills: 0 | Status: COMPLETED | COMMUNITY
Start: 2022-02-22 | End: 2023-03-23

## 2023-03-24 ENCOUNTER — APPOINTMENT (OUTPATIENT)
Dept: PULMONOLOGY | Facility: CLINIC | Age: 83
End: 2023-03-24
Payer: MEDICARE

## 2023-03-24 VITALS
TEMPERATURE: 97.4 F | RESPIRATION RATE: 16 BRPM | DIASTOLIC BLOOD PRESSURE: 74 MMHG | WEIGHT: 190 LBS | SYSTOLIC BLOOD PRESSURE: 124 MMHG | BODY MASS INDEX: 28.14 KG/M2 | OXYGEN SATURATION: 98 % | HEIGHT: 69 IN | HEART RATE: 64 BPM

## 2023-03-24 PROCEDURE — 94727 GAS DIL/WSHOT DETER LNG VOL: CPT

## 2023-03-24 PROCEDURE — 94010 BREATHING CAPACITY TEST: CPT

## 2023-03-24 PROCEDURE — 99214 OFFICE O/P EST MOD 30 MIN: CPT | Mod: CS,25

## 2023-03-24 PROCEDURE — 94729 DIFFUSING CAPACITY: CPT

## 2023-03-24 PROCEDURE — 95012 NITRIC OXIDE EXP GAS DETER: CPT

## 2023-03-24 NOTE — HISTORY OF PRESENT ILLNESS
[FreeTextEntry1] : Mr. Hassan is a 82 year old male with a history of abnormal chest CT, allergic rhinitis, asthma, centrilobular emphysema, GERD, KARAN, and SOB presenting to the office today for a follow up visit. His chief complaint is\par \par -s/p COVID-19 2/20/2023 (minimal Sx)\par -he notes using apple cider vinegar for leg cramps\par -he notes exercising (golfing)\par -he notes weight is stable \par -he notes appetite is stable \par -he notes quality of sleep can be improved\par -he notes taking 30-90 min nap daily\par -he notes lumbago which is worse when walking\par \par -he denies any headaches, nausea, emesis, fever, chills, sweats, chest pain, chest pressure, coughing, wheezing, palpitations, constipation, diarrhea, vertigo, dysphagia, heartburn, reflux, itchy eyes, itchy ears, leg swelling, leg pain, arthralgias, or sour taste in the mouth.

## 2023-03-24 NOTE — ADDENDUM
[FreeTextEntry1] : Documented by JOSE LUIS Medina acting as a scribe for Dr. Raymundo Rai on 03/24/2023 .\par \par All medical record entries made by the Scribe were at my, Dr. Raymundo Rai's, direction and personally dictated by me on 03/24/2023. I have reviewed the chart and agree that the record accurately reflects my personal performance of the history, physical exam, assessment and plan. I have also personally directed, reviewed, and agree with the discharge instructions.

## 2023-03-24 NOTE — ASSESSMENT
[FreeTextEntry1] : Mr. Hassan is 82 year old Male who is stable from a pulmonary perspective, exercising and asymptomatic. He has a history of COVID-19 2/2023, asthma, eosinophilia, COPD, abnormal CT, CAD, OSAS, RLS and neuropathy (feet). His main issues are neuropathy / back (still)\par \par Problem 1: lung cancer screening (stable)\par -f/u chest CT  next 3/2023 (pending)\par -Lung Cancer Screening is recommended for people between the ages of 55 and 80 with prior 30+ pack year smoking histories. There is irrefutable evidence for realization of lung cancer screening based on two large randomized control trials demonstrating relative reduction in lung cancer mortality for patients undergoing low-dose CT scanning. Risks and benefits reviewed with the patient. \par \par Problem 2: asthma / COPD\par -Quiet off all mediations at this time\par -Asthma is believed to be caused by inherited (genetic) and environmental factor, but its exact cause is unknown. Asthma may be triggered by allergens, lung infections, or irritants in the air. Asthma triggers are different for each person \par -Inhaler technique reviewed as well as oral hygiene techniques reviewed with patient. Avoidance of cold air, extremes of temperature, rescue inhaler should be used before exercise. Order of medication reviewed with patient. Recommended use of a cool mist humidifier in the bedroom \par -COPD is a progressive disease and although it can’t be cured , appropriate management can slow its progression, reduce frequency and severity of exacerbations, and improve symptoms and the patient quality of life. Hospitalizations are the greatest contributor to the total COPD costs and account for up to 87% of total COPD related costs. Exacerbations are the main cause of admissions and subsequently account for the 40-75% of COPD costs. Inhaled maintenance therapy reduces the incidence of exacerbations in patients with stable COPD. Incorrect inhaler use and nonadherence are major obstacles to achieving COPD treatment goals. Many COPD patients have challenges (impaired inhalation, limited dexterity, reduced cognition: that limit their ability to correctly use their COPD treatment devices resulting in reduced symptom control. Of most importance is smoking cessation and early intervention with respiratory illnesses and contemplation for pulmonary rehab to enhance quality of life.\par \par Problem 2A: Eosinophilic Asthma\par -Candidate for Fasenra, Nucala, Dupixent\par - The safety and efficacy of Nucala was established in three double-blind , randomized, placebo controlled trials in patients with severe asthma. Compared to a placebo, patients with severe asthma receiving Nucala hada fever exacerbation requiring hospitalization and.or emergency department visits, and a longer time to first exacerbation. In addition, patients with severe asthma receiving Nucala or Fasenra experienced greater reductions in their daily maintenance oral corticosteroid dose,m while maintaining asthma control compared with patients receiving placebo. Treatment with Nucala did not result in a significant improvement in lung function as measured by the volume of air exhaled by patients in one second. The most common side effects include: headache, injection site reactions back pain, weakness and fatigue; hypersensitivity reactions can occur within hours or days including swelling of the face, mouth and tongue, fainting, dizziness, hives, breathing problems and rash; herpes zoster infections have occurred. The drug is a monoclonal antibody that inhibits interleukin-5 which helps regular eosinophils, a type of white blood cell that contributes to asthma. The over-prodcution of eosinophils can cause inflammation in the lungs, increasing the frequency of asthma attacks. Patients must also take other medications, including high dose inhaled corticosteroids and at least one additional asthma drug.\par \par Problem 2B: Rash c/w atopic dermatitis\par -s/p dermatology evaluation\par -Initiate Dupixent IF NEEDED \par -recommended Borage and Flax Seed Oil \par -Recommended to see Dr. Ruby (dermatology)\par \par Problem 2C: Cardiac \par -recommended cardiac eval with Dr. Héctor Rea \par \par Problem 3: GERD\par -continue Nexium 40 mg QAM\par \par -Rule of 2's- Avoid eating too much, too late, too poorly, too spicy, or two hours before bed \par -Things to avoid including overeating, spicy foods, tight clothing, eating within three hours of bed, this list is not all inclusive. \par -For treatment of reflux, possible options discussed including diet control, H2 blockers, PPIs, as well as coating motility agents discussed as treatment options. Timing of meals and proximity of last meal to sleep were discussed. If symptoms persist, a formal gastrointestinal evaluation is needed.\par \par Problem 4: Allergies/ sinus (quiet)\par -continue nasal saline PRN \par -recommended OTC antihistamines\par - get blood work: CBC and vitamin D level \par Environmental measures for allergies were encouraged including mattress and pillow cover, air purifier, and environmental controls.\par \par Problem 5: OSAS ([place]) \par -recommended to use "Chin Strap" \par -intolerable of other treatments - CPAP and dental device \par -Sleep guard recommended QD at night \par -continue to use Benadryl PRN \par -Recommended Oxy-aid\par -Discussed the risks/associations with coronary artery disease, atrial fibrillation, arrhythmia, memory loss, issues with concentration, stroke risk, hypertension, nocturia, chronic reflux/Interiano’s esophagus some but not all inclusive. Treatment options discussed including CPAP/BiPAP machine, oral appliance, ProVent therapy, Oxy-Aid by [employer], new technologies, or positional sleep. \par \par Problem 6: Fatigue\par -CoQ-10 200 recommended \par -continue supplemental vitamin D\par \par Problem 7: RLS\par -off medications at this time\par Restless Legs Syndrome (RLS), also known as Reyes- Ekbom Disease, is a common sleep -related movement disorder. About 1 in 10 adults in the [place] have problems from restless leg syndrome. It also can be seen in about 2% of children. Women are twice as likely as men to have RLS. People with RLS will have symptoms most often during times when they are less active, especially at bedtime. RLS most often causes an overwhelming urge to move your legs and sometimes other parts of your body. This urge is associated with unpleasant sensations in different parts of the body. The symptoms can be mild to severe and can affect your ability to go to sleep and stay asleep. People with RLS often sleep less at night and feel more tired during the day. \par \par Problem 8: Neuropathy\par -Continue Neurontin 100 QHS\par -recommended Neuro Renew\par -recommended B complex Vitamin \par -recommended P6 cream\par -recommended L Glutamine 500 mg BID \par -recommended to use Alpha- Lipoic Acid 500 mg BID\par \par Problem 9: health maintenance\par -recommended [employer] stretches for the back \par -s/p COVID Vaccine Moderna x 4 - not recommending any more boosters at the current time\par -s/p influenza vaccine 2022\par -recommended strep pneumonia vaccines: Prevnar-13 vaccine, followed by Pneumo vaccine 23 on year following (completed) \par -recommended early intervention for URIs\par -recommended osteoporosis evaluations\par -recommended early dermatological evaluations\par -recommended after the age of 50 to the age of 70, colonoscopy every 5 years\par -encouraged early intervention\par \par \par F/u in 6 months - SPI \par pt is encouraged to call or fax the office with any questions or concerns.

## 2023-03-24 NOTE — PROCEDURE
[FreeTextEntry1] : Full PFT reveals normal flows; FEV1 was 2.37L which is 85% of predicted with very mild obstructive dysfunction at mid-low volumes; normal lung volumes; normal diffusion at 15.3, which is 80% of predicted; normal flow volume loop.\par \par FENO was 9; a normal value being less than 25\par Fractional exhaled nitric oxide (FENO) is regarded as a simple, noninvasive method for assessing eosinophilic airway inflammation. Produced by a variety of cells within the lung, nitric oxide (NO) concentrations are generally low in healthy individuals. However, high concentrations of NO appear to be involved in nonspecific host defense mechanisms and chronic inflammatory diseases such as asthma. The American Thoracic Society (ATS) therefore has recommended using FENO to aid in the diagnosis and monitoring of eosinophilic airway inflammation and asthma, and for identifying steroid responsive individuals whose chronic respiratory symptoms may be caused by airway inflammation.

## 2023-04-25 ENCOUNTER — LABORATORY RESULT (OUTPATIENT)
Age: 83
End: 2023-04-25

## 2023-04-25 ENCOUNTER — APPOINTMENT (OUTPATIENT)
Dept: CARDIOLOGY | Facility: CLINIC | Age: 83
End: 2023-04-25
Payer: MEDICARE

## 2023-04-25 ENCOUNTER — NON-APPOINTMENT (OUTPATIENT)
Age: 83
End: 2023-04-25

## 2023-04-25 VITALS
RESPIRATION RATE: 16 BRPM | TEMPERATURE: 98.1 F | BODY MASS INDEX: 27.7 KG/M2 | OXYGEN SATURATION: 95 % | SYSTOLIC BLOOD PRESSURE: 138 MMHG | HEIGHT: 69 IN | WEIGHT: 187 LBS | HEART RATE: 68 BPM | DIASTOLIC BLOOD PRESSURE: 80 MMHG

## 2023-04-25 DIAGNOSIS — Z01.810 ENCOUNTER FOR PREPROCEDURAL CARDIOVASCULAR EXAMINATION: ICD-10-CM

## 2023-04-25 PROCEDURE — 99214 OFFICE O/P EST MOD 30 MIN: CPT | Mod: 25

## 2023-04-25 PROCEDURE — 93000 ELECTROCARDIOGRAM COMPLETE: CPT | Mod: NC

## 2023-04-25 NOTE — DISCUSSION/SUMMARY
[FreeTextEntry1] : Dr. Rea-(PRIOR VISIT and PMH WITH Dr. Rea): \par This is a 82-year-old male with past medical history significant for coronary artery disease, status post stent to right coronary artery, hypertension, hyperlipidemia, statin intolerance, mitral valve regurgitation, who comes in for followup cardiac evaluation.\par \par He denies chest pain, palpitations, dizziness or syncope.  He may get occasional dyspnea on exertion.  He is concerned about stent restenosis of his stents.  The symptoms may be related to his blood pressure response to exercise, reduced aerobic capacity.\par He will schedule a pharmacologic nuclear stress test to evaluate his coronary artery perfusion.\par \par The patient discontinued his own amlodipine about 2 months ago "tomorrow to be on a lot of pills".\par He also did not get a renewal on his PCSK9 injectable therapy which has been off of for approximately a month. He understands he must renew his Repatha and start taking it again every 2 weeks.\par \par Electrocardiogram done March 13, 2023 demonstrated normal sinus rhythm rate 65 bpm is otherwise remarkable for poor baseline with artifact.\par \par Electrocardiogram done October 7, 2022 demonstrated normal sinus rhythm rate of 61 bpm is otherwise unremarkable.\par \par Echo Doppler examination done July 17, 2022 demonstrated mild posterior mitral valve leaflet prolapse with moderate mitral valve regurgitation, minimal aortic valve regurgitation, mild tricuspid valve regurgitation, mild pulmonic valve regurgitation.\par Electrocardiogram done November 3, 2021 demonstrate normal sinus rhythm rate 66 bpm is otherwise within normal limits.\par \par Electrocardiogram done May 18, 2021 demonstrate normal sinus rhythm rate 62 bpm and is otherwise unremarkable.\par \par I have asked him to follow-up in 6 weeks to recheck his blood pressure.  He reports his blood pressure is within normal limits and on the doctor's offices.\par \par Lipid panel done October 8, 2022 demonstrated a total cholesterol 164, HDL 58, LDL direct of 87 mg/dL non-HDL cholesterol 106 mg/dL with triglycerides 105 mg/dL.  This will also be repeated at the time of next visit when he really starts Repatha therapy.\par \par The patient will continue on his usual medications.  He will have new blood work done today for electrolytes and lipid panel.\par \par He will continue on his current dose of Zetia 10 mg daily.\par \par Blood work done November 3, 2021 demonstrated direct LDL of 77 mg/dL with total cholesterol 162 mg/dL.  The patient's target LDL is less than 70 mg/dL.  I have recommended he add Zetia 10 mg daily.  I do not believe his previous symptoms on Zetia related to the Zetia treatment.\par He will have blood work done and 8 to 10 weeks.\par \par Electrocardiogram done November 3, 2021 demonstrate normal sinus rhythm rate 66 bpm is otherwise within normal limits.\par \par Electrocardiogram done May 18, 2021 demonstrate normal sinus rhythm rate 62 bpm and is otherwise unremarkable.\par \par PMH:\par The patient reports that the Zetia was making him feel groggy.  I have explained to him that I feel his gabapentin 3 times a day is more likely causing that side effect.\par His LDL cholesterol needs to be less than 70 mg/dL.\par The patient also complains of bilateral cramping in his calf muscles when walking.  He is no longer on Crestor 5 mg/day.  However given his risk profile, the patient will have an ankle-brachial index done today.\par .He had recent venous ablation done January 7, 2020 by vascular surgery.  This was complicated by localized infection.  He will followup with his vascular surgeon.\par Lifestyle modification was reinforced.\par The patient was on Praluent because of statin intolerance.\par \par PMH:\par He had a cardiac catheterization done November 10, 2011 which revealed a 40% lesion in the left anterior descending artery, luminal irregularities in the distal left main artery and circumflex artery, 20% tubular stenosis at the site of the prior stent. Medical therapy was recommended at that time.\par He had a bare-metal stent placed in the proximal mid right coronary artery in February 2002.\par \par The patient understands that aerobic exercises must be increased to 40 minutes 4 times per week. A detailed discussion of lifestyle modification was done today. The patient has a good understanding of the diagnosis, and treatment plan. Lifestyle modification was also outlined.

## 2023-04-25 NOTE — ASSESSMENT
[FreeTextEntry1] : This is a 82-year-old male with a past medical history significant for coronary artery disease, s/p  stent to right coronary artery, hypertension, hyperlipidemia, statin intolerance, mitral valve regurgitation, comes in for followup cardiac evaluation. He is s/p b/l venous ablation on January 7, 2020 with Dr. Brooks. \par \par He is feeling generally well today and denies chest pain, dizziness, heart palpitations, recent episodes of syncope or falls, SOB, or dyspnea at this time.\par \par CHIEF COMPLAINT:\par Today he is feeling generally well and does not have any complaints at this time.  He is here for cardiac clearance for a colonoscopy to be done by Dr. Ling on 5/9/23 their office phone number is [phone].\par \par He is currently prescribed Cetamide 10 mg daily and Repatha 140 mg/inj. Patient states he is not on any antiplatelet therapy at this time.  He is not on aspirin therapy despite having history of coronary artery stents.  He is refusing to be on aspirin since he has a history for hematuria.\par \par He denies fever, chills, weight loss, malaise, rash, alteration bowel habits, weakness, abdominal  pain, bloating, changes in urination, visual disturbances, chest pain, headaches, dizziness, heart palpitations, recent episodes of syncope or falls at this time.\par \par BLOOD PRESSURE:\par -BP is well controlled in today's visit.\par \par BLOOD WORK:\par -New blood work was done 04/25/2023 to evaluate lipid profile, CBC, BMP, hepatic function, A1C and TSH\par \par TESTING/REPORTS:\par -EKG done Apr 25, 2023 which demonstrated regular sinus rhythm with nonspecific ST-T wave changes, BPM of 63.\par \par -Electrocardiogram done March 13, 2023 demonstrated normal sinus rhythm rate 65 bpm is otherwise remarkable for poor baseline with artifact.\par \par -Electrocardiogram done October 7, 2022 demonstrated normal sinus rhythm rate of 61 bpm is otherwise unremarkable.\par \par -Echo Doppler examination done July 17, 2022 demonstrated mild posterior mitral valve leaflet prolapse with moderate mitral valve regurgitation, minimal aortic valve regurgitation, mild tricuspid valve regurgitation, mild pulmonic valve regurgitation.\par \par -Electrocardiogram done November 3, 2021 demonstrate normal sinus rhythm rate 66 bpm is otherwise within normal limits.\par \par -Electrocardiogram done May 18, 2021 demonstrate normal sinus rhythm rate 62 bpm and is otherwise unremarkable.\par \par PMH:\par He had a cardiac catheterization done November 10, 2011 which revealed a 40% lesion in the left anterior descending artery, luminal irregularities in the distal left main artery and circumflex artery, 20% tubular stenosis at the site of the prior stent. Medical therapy was recommended at that time.\par He had a bare-metal stent placed in the proximal mid right coronary artery in February 2002.\par \par PLAN:\par The patient is clear from a cardiac standpoint for his upcoming colonoscopy.  He is not on aspirin therapy at this time.\par -The patient is clinically stable from a cardiac standpoint on today's exam.\par -He is scheduled to have a pharmacologic nuclear stress test done to further define his coronary artery anatomy.\par -He will continue with his usual medications and will contact the office if he is having any complaints between now and their next follow up appointment.\par \par I have discussed the plan of care with Mr. ROSCOE CRUZ  and he  will follow up in 1 month. He is compliant with all of his medications.\par The patient understands that aerobic exercises must be increased to at least 20 minutes 4 times/week along with maintaining lifestyle modifications including well-maintained diet. He will contact me at the office for any questions with their care or any changes in their health status.\par \par Adelita ORR\par

## 2023-04-25 NOTE — PHYSICAL EXAM
[Carotid Bruit] : carotid bruit [General Appearance - Well Developed] : well developed [Normal Appearance] : normal appearance [Well Groomed] : well groomed [General Appearance - Well Nourished] : well nourished [No Deformities] : no deformities [General Appearance - In No Acute Distress] : no acute distress [Normal Conjunctiva] : the conjunctiva exhibited no abnormalities [Normal Oral Mucosa] : normal oral mucosa [No Oral Pallor] : no oral pallor [Normal Oropharynx] : normal oropharynx [Normal Jugular Venous A Waves Present] : normal jugular venous A waves present [Normal Jugular Venous V Waves Present] : normal jugular venous V waves present [No Jugular Venous Jewell A Waves] : no jugular venous jewell A waves [Respiration, Rhythm And Depth] : normal respiratory rhythm and effort [Exaggerated Use Of Accessory Muscles For Inspiration] : no accessory muscle use [Auscultation Breath Sounds / Voice Sounds] : lungs were clear to auscultation bilaterally [Bowel Sounds] : normal bowel sounds [Abdomen Soft] : soft [Abnormal Walk] : normal gait [Abdomen Tenderness] : non-tender [Nail Clubbing] : no clubbing of the fingernails [Cyanosis, Localized] : no localized cyanosis [Petechial Hemorrhages (___cm)] : no petechial hemorrhages [Skin Color & Pigmentation] : normal skin color and pigmentation [Skin Turgor] : normal skin turgor [] : no rash [No Venous Stasis] : no venous stasis [Impaired Insight] : insight and judgment were intact [Oriented To Time, Place, And Person] : oriented to person, place, and time [Affect] : the affect was normal [No Anxiety] : not feeling anxious [5th Left ICS - MCL] : palpated at the 5th LICS in the midclavicular line [Normal] : normal [No Precordial Heave] : no precordial heave was noted [Normal Rate] : normal [Rhythm Regular] : regular [Normal S1] : normal S1 [Normal S2] : normal S2 [No Gallop] : no gallop heard [No Murmur] : no murmurs heard [II] : a grade 2 [I] : a grade 1 [2+] : left 2+ [No Abnormalities] : the abdominal aorta was not enlarged and no bruit was heard [No Pitting Edema] : no pitting edema present [Rt] : varicose veins of the right leg noted [Lt] : varicose veins of the left leg noted [de-identified] : left side possible bruit or transmitted murmur. [Apical Thrill] : no thrill palpable at the apex [S3] : no S3 [S4] : no S4 [Click] : no click [Pericardial Rub] : no pericardial rub [Right Carotid Bruit] : no bruit heard over the right carotid [Left Carotid Bruit] : no bruit heard over the left carotid [Right Femoral Bruit] : no bruit heard over the right femoral artery [Left Femoral Bruit] : no bruit heard over the left femoral artery

## 2023-05-16 ENCOUNTER — APPOINTMENT (OUTPATIENT)
Dept: CARDIOLOGY | Facility: CLINIC | Age: 83
End: 2023-05-16
Payer: MEDICARE

## 2023-05-16 PROCEDURE — 93015 CV STRESS TEST SUPVJ I&R: CPT

## 2023-05-16 PROCEDURE — 78452 HT MUSCLE IMAGE SPECT MULT: CPT

## 2023-05-16 PROCEDURE — A9500: CPT

## 2023-05-26 ENCOUNTER — APPOINTMENT (OUTPATIENT)
Dept: CARDIOLOGY | Facility: CLINIC | Age: 83
End: 2023-05-26
Payer: MEDICARE

## 2023-05-26 VITALS
HEIGHT: 69 IN | OXYGEN SATURATION: 100 % | TEMPERATURE: 97.2 F | DIASTOLIC BLOOD PRESSURE: 82 MMHG | WEIGHT: 188 LBS | SYSTOLIC BLOOD PRESSURE: 138 MMHG | BODY MASS INDEX: 27.85 KG/M2 | HEART RATE: 68 BPM | RESPIRATION RATE: 16 BRPM

## 2023-05-26 DIAGNOSIS — R94.39 ABNORMAL RESULT OF OTHER CARDIOVASCULAR FUNCTION STUDY: ICD-10-CM

## 2023-05-26 DIAGNOSIS — I71.40 ABDOMINAL AORTIC ANEURYSM, WITHOUT RUPTURE, UNSPECIFIED: ICD-10-CM

## 2023-05-26 PROCEDURE — 99214 OFFICE O/P EST MOD 30 MIN: CPT | Mod: 25

## 2023-05-26 NOTE — PHYSICAL EXAM
[Carotid Bruit] : carotid bruit [de-identified] : left side possible bruit or transmitted murmur. [General Appearance - Well Developed] : well developed [Normal Appearance] : normal appearance [Well Groomed] : well groomed [General Appearance - Well Nourished] : well nourished [No Deformities] : no deformities [General Appearance - In No Acute Distress] : no acute distress [Normal Conjunctiva] : the conjunctiva exhibited no abnormalities [Normal Oral Mucosa] : normal oral mucosa [No Oral Pallor] : no oral pallor [Normal Oropharynx] : normal oropharynx [Normal Jugular Venous A Waves Present] : normal jugular venous A waves present [Normal Jugular Venous V Waves Present] : normal jugular venous V waves present [No Jugular Venous Jewell A Waves] : no jugular venous jewell A waves [Respiration, Rhythm And Depth] : normal respiratory rhythm and effort [Exaggerated Use Of Accessory Muscles For Inspiration] : no accessory muscle use [Auscultation Breath Sounds / Voice Sounds] : lungs were clear to auscultation bilaterally [Bowel Sounds] : normal bowel sounds [Abdomen Soft] : soft [Abdomen Tenderness] : non-tender [Abnormal Walk] : normal gait [Nail Clubbing] : no clubbing of the fingernails [Cyanosis, Localized] : no localized cyanosis [Petechial Hemorrhages (___cm)] : no petechial hemorrhages [Skin Color & Pigmentation] : normal skin color and pigmentation [Skin Turgor] : normal skin turgor [] : no rash [No Venous Stasis] : no venous stasis [Oriented To Time, Place, And Person] : oriented to person, place, and time [Impaired Insight] : insight and judgment were intact [Affect] : the affect was normal [No Anxiety] : not feeling anxious [5th Left ICS - MCL] : palpated at the 5th LICS in the midclavicular line [Normal] : normal [No Precordial Heave] : no precordial heave was noted [Apical Thrill] : no thrill palpable at the apex [Normal Rate] : normal [Rhythm Regular] : regular [Normal S1] : normal S1 [Normal S2] : normal S2 [No Gallop] : no gallop heard [S3] : no S3 [S4] : no S4 [Click] : no click [Pericardial Rub] : no pericardial rub [No Murmur] : no murmurs heard [II] : a grade 2 [I] : a grade 1 [Right Carotid Bruit] : no bruit heard over the right carotid [Left Carotid Bruit] : no bruit heard over the left carotid [Right Femoral Bruit] : no bruit heard over the right femoral artery [Left Femoral Bruit] : no bruit heard over the left femoral artery [2+] : left 2+ [No Abnormalities] : the abdominal aorta was not enlarged and no bruit was heard [No Pitting Edema] : no pitting edema present [Rt] : varicose veins of the right leg noted [Lt] : varicose veins of the left leg noted

## 2023-05-26 NOTE — ASSESSMENT
[FreeTextEntry1] : This is a 83-year-old male with a past medical history significant for coronary artery disease, s/p  stent to right coronary artery, hypertension, hyperlipidemia, statin intolerance, mitral valve regurgitation, comes in for followup cardiac evaluation. He is s/p b/l venous ablation on January 7, 2020 with Dr. Brooks. \par \par He is feeling generally well today and denies chest pain, dizziness, heart palpitations, recent episodes of syncope or falls, SOB, or dyspnea at this time.\par \par CHIEF COMPLAINT:\par Today he is feeling generally well and does not have any complaints at this time.  He is here to go over the results of his nuclear stress test and for regular follow-up appointment.\par \par He is currently prescribed Zetia 10mg  PO Daily and Repatha 140 mg/inj tolerating well.\par \par Patient states he is not on any antiplatelet therapy at this time.  He is not on aspirin therapy despite having history of coronary artery stents.  He is refusing to be on aspirin since he has a history for hematuria.  He will ask his urologist if he may be a candidate for Plavix.\par \par He denies fever, chills, weight loss, malaise, rash, alteration bowel habits, weakness, abdominal  pain, bloating, changes in urination, visual disturbances, chest pain, headaches, dizziness, heart palpitations, recent episodes of syncope or falls at this time.\par \par BLOOD PRESSURE:\par -BP is borderline elevated on today's exam.  He states his readings are normally within normal ranges and that he is never had high blood pressure.\par \par BLOOD WORK:\par -New blood work was done 04/25/2023 to evaluate lipid profile, CBC, BMP, hepatic function, A1C and TSH\par \par TESTING/REPORTS:\par -The patient had an abnormal nuclear stress test which was done on May 16th 2023 which demonstrated small, mild defects in distal inferior and inferior apical walls that are reversible consistent with ischemia.  There are small, moderate defects in the basal inferior, basal inferior lateral walls that were fixed consistent with infarct.\par *At this time during our visit the patient is not symptomatic and does not have any chest pain or shortness of breath.  He states that he played golf all day yesterday and was feeling well otherwise.  He does have a history of 2 coronary artery stents however is not on antiplatelet therapy.\par -EKG done Apr 25, 2023 which demonstrated regular sinus rhythm with nonspecific ST-T wave changes, BPM of 63.\par \par -Electrocardiogram done March 13, 2023 demonstrated normal sinus rhythm rate 65 bpm is otherwise remarkable for poor baseline with artifact.\par \par -Electrocardiogram done October 7, 2022 demonstrated normal sinus rhythm rate of 61 bpm is otherwise unremarkable.\par \par -Echo Doppler examination done July 17, 2022 demonstrated mild posterior mitral valve leaflet prolapse with moderate mitral valve regurgitation, minimal aortic valve regurgitation, mild tricuspid valve regurgitation, mild pulmonic valve regurgitation.\par \par -Electrocardiogram done November 3, 2021 demonstrate normal sinus rhythm rate 66 bpm is otherwise within normal limits.\par \par -Electrocardiogram done May 18, 2021 demonstrate normal sinus rhythm rate 62 bpm and is otherwise unremarkable.\par \par PMH:\par He had a cardiac catheterization done November 10, 2011 which revealed a 40% lesion in the left anterior descending artery, luminal irregularities in the distal left main artery and circumflex artery, 20% tubular stenosis at the site of the prior stent. Medical therapy was recommended at that time.\par He had a bare-metal stent placed in the proximal mid right coronary artery in February 2002.\par \par PLAN:\par -The patient will proceed with a CCTA to further evaluate his coronary artery anatomy.  He would like to have this done to make sure that he does not have any risk for potential MI/blockage.\par -He will continue with his usual medications and will contact the office if he is having any complaints between now and their next follow up appointment.\par -He was told that we will reevaluate his blood pressure in the future and that he may need antihypertensive medication if blood pressure does not improve.\par \par I have discussed the plan of care with . ROSCOE CRUZ  and he  will follow up in 3 months. He is compliant with all of his medications.\par The patient understands that aerobic exercises must be increased to at least 20 minutes 4 times/week along with maintaining lifestyle modifications including well-maintained diet. He will contact me at the office for any questions with their care or any changes in their health status.\par \par Adelita ORR\par

## 2023-06-19 ENCOUNTER — OUTPATIENT (OUTPATIENT)
Dept: OUTPATIENT SERVICES | Facility: HOSPITAL | Age: 83
LOS: 1 days | End: 2023-06-19
Payer: MEDICARE

## 2023-06-19 ENCOUNTER — APPOINTMENT (OUTPATIENT)
Dept: CT IMAGING | Facility: CLINIC | Age: 83
End: 2023-06-19
Payer: MEDICARE

## 2023-06-19 DIAGNOSIS — R93.89 ABNORMAL FINDINGS ON DIAGNOSTIC IMAGING OF OTHER SPECIFIED BODY STRUCTURES: ICD-10-CM

## 2023-06-19 DIAGNOSIS — Z98.890 OTHER SPECIFIED POSTPROCEDURAL STATES: Chronic | ICD-10-CM

## 2023-06-19 PROCEDURE — 71250 CT THORAX DX C-: CPT | Mod: 26,MH

## 2023-06-19 PROCEDURE — 71250 CT THORAX DX C-: CPT

## 2023-06-30 ENCOUNTER — NON-APPOINTMENT (OUTPATIENT)
Age: 83
End: 2023-06-30

## 2023-07-10 ENCOUNTER — APPOINTMENT (OUTPATIENT)
Dept: CT IMAGING | Facility: CLINIC | Age: 83
End: 2023-07-10
Payer: MEDICARE

## 2023-07-10 ENCOUNTER — OUTPATIENT (OUTPATIENT)
Dept: OUTPATIENT SERVICES | Facility: HOSPITAL | Age: 83
LOS: 1 days | End: 2023-07-10
Payer: MEDICARE

## 2023-07-10 DIAGNOSIS — Z98.890 OTHER SPECIFIED POSTPROCEDURAL STATES: Chronic | ICD-10-CM

## 2023-07-10 DIAGNOSIS — Z98.61 CORONARY ANGIOPLASTY STATUS: Chronic | ICD-10-CM

## 2023-07-10 DIAGNOSIS — R94.39 ABNORMAL RESULT OF OTHER CARDIOVASCULAR FUNCTION STUDY: ICD-10-CM

## 2023-07-10 PROCEDURE — 75574 CT ANGIO HRT W/3D IMAGE: CPT

## 2023-07-10 PROCEDURE — 75574 CT ANGIO HRT W/3D IMAGE: CPT | Mod: 26,MH

## 2023-07-11 ENCOUNTER — OUTPATIENT (OUTPATIENT)
Dept: OUTPATIENT SERVICES | Facility: HOSPITAL | Age: 83
LOS: 1 days | End: 2023-07-11
Payer: MEDICARE

## 2023-07-11 ENCOUNTER — RESULT REVIEW (OUTPATIENT)
Age: 83
End: 2023-07-11

## 2023-07-11 DIAGNOSIS — Z98.890 OTHER SPECIFIED POSTPROCEDURAL STATES: Chronic | ICD-10-CM

## 2023-07-11 DIAGNOSIS — Z00.8 ENCOUNTER FOR OTHER GENERAL EXAMINATION: ICD-10-CM

## 2023-07-11 DIAGNOSIS — Z98.61 CORONARY ANGIOPLASTY STATUS: Chronic | ICD-10-CM

## 2023-07-11 PROCEDURE — 0503T: CPT

## 2023-07-11 PROCEDURE — 0504T: CPT

## 2023-07-11 PROCEDURE — 0502T: CPT

## 2023-07-18 ENCOUNTER — TRANSCRIPTION ENCOUNTER (OUTPATIENT)
Age: 83
End: 2023-07-18

## 2023-07-18 ENCOUNTER — INPATIENT (INPATIENT)
Facility: HOSPITAL | Age: 83
LOS: 0 days | Discharge: HOME | DRG: 247 | End: 2023-07-19
Attending: INTERNAL MEDICINE | Admitting: INTERNAL MEDICINE
Payer: COMMERCIAL

## 2023-07-18 VITALS — HEIGHT: 69 IN | WEIGHT: 190.04 LBS

## 2023-07-18 DIAGNOSIS — I25.10 ATHEROSCLEROTIC HEART DISEASE OF NATIVE CORONARY ARTERY WITHOUT ANGINA PECTORIS: ICD-10-CM

## 2023-07-18 DIAGNOSIS — Z98.890 OTHER SPECIFIED POSTPROCEDURAL STATES: Chronic | ICD-10-CM

## 2023-07-18 DIAGNOSIS — Z98.61 CORONARY ANGIOPLASTY STATUS: Chronic | ICD-10-CM

## 2023-07-18 LAB
ANION GAP SERPL CALC-SCNC: 15 MMOL/L — SIGNIFICANT CHANGE UP (ref 5–17)
BASOPHILS # BLD AUTO: 0.06 K/UL — SIGNIFICANT CHANGE UP (ref 0–0.2)
BASOPHILS NFR BLD AUTO: 1.2 % — SIGNIFICANT CHANGE UP (ref 0–2)
BUN SERPL-MCNC: 25 MG/DL — HIGH (ref 7–23)
CALCIUM SERPL-MCNC: 8.7 MG/DL — SIGNIFICANT CHANGE UP (ref 8.4–10.5)
CHLORIDE SERPL-SCNC: 107 MMOL/L — SIGNIFICANT CHANGE UP (ref 96–108)
CO2 SERPL-SCNC: 21 MMOL/L — LOW (ref 22–31)
CREAT SERPL-MCNC: 1.27 MG/DL — SIGNIFICANT CHANGE UP (ref 0.5–1.3)
EGFR: 57 ML/MIN/1.73M2 — LOW
EOSINOPHIL # BLD AUTO: 0.47 K/UL — SIGNIFICANT CHANGE UP (ref 0–0.5)
EOSINOPHIL NFR BLD AUTO: 9.8 % — HIGH (ref 0–6)
GLUCOSE SERPL-MCNC: 96 MG/DL — SIGNIFICANT CHANGE UP (ref 70–99)
HCT VFR BLD CALC: 42 % — SIGNIFICANT CHANGE UP (ref 39–50)
HGB BLD-MCNC: 13.3 G/DL — SIGNIFICANT CHANGE UP (ref 13–17)
IMM GRANULOCYTES NFR BLD AUTO: 0.8 % — SIGNIFICANT CHANGE UP (ref 0–0.9)
LYMPHOCYTES # BLD AUTO: 1.3 K/UL — SIGNIFICANT CHANGE UP (ref 1–3.3)
LYMPHOCYTES # BLD AUTO: 27 % — SIGNIFICANT CHANGE UP (ref 13–44)
MCHC RBC-ENTMCNC: 30.6 PG — SIGNIFICANT CHANGE UP (ref 27–34)
MCHC RBC-ENTMCNC: 31.7 GM/DL — LOW (ref 32–36)
MCV RBC AUTO: 96.6 FL — SIGNIFICANT CHANGE UP (ref 80–100)
MONOCYTES # BLD AUTO: 0.59 K/UL — SIGNIFICANT CHANGE UP (ref 0–0.9)
MONOCYTES NFR BLD AUTO: 12.2 % — SIGNIFICANT CHANGE UP (ref 2–14)
NEUTROPHILS # BLD AUTO: 2.36 K/UL — SIGNIFICANT CHANGE UP (ref 1.8–7.4)
NEUTROPHILS NFR BLD AUTO: 49 % — SIGNIFICANT CHANGE UP (ref 43–77)
NRBC # BLD: 0 /100 WBCS — SIGNIFICANT CHANGE UP (ref 0–0)
PLATELET # BLD AUTO: 204 K/UL — SIGNIFICANT CHANGE UP (ref 150–400)
POTASSIUM SERPL-MCNC: 4.1 MMOL/L — SIGNIFICANT CHANGE UP (ref 3.5–5.3)
POTASSIUM SERPL-SCNC: 4.1 MMOL/L — SIGNIFICANT CHANGE UP (ref 3.5–5.3)
RBC # BLD: 4.35 M/UL — SIGNIFICANT CHANGE UP (ref 4.2–5.8)
RBC # FLD: 12.8 % — SIGNIFICANT CHANGE UP (ref 10.3–14.5)
SODIUM SERPL-SCNC: 143 MMOL/L — SIGNIFICANT CHANGE UP (ref 135–145)
WBC # BLD: 4.82 K/UL — SIGNIFICANT CHANGE UP (ref 3.8–10.5)
WBC # FLD AUTO: 4.82 K/UL — SIGNIFICANT CHANGE UP (ref 3.8–10.5)

## 2023-07-18 PROCEDURE — 93454 CORONARY ARTERY ANGIO S&I: CPT | Mod: 26,59

## 2023-07-18 PROCEDURE — 92928 PRQ TCAT PLMT NTRAC ST 1 LES: CPT | Mod: LD

## 2023-07-18 PROCEDURE — 92978 ENDOLUMINL IVUS OCT C 1ST: CPT | Mod: 26,LD

## 2023-07-18 PROCEDURE — 99152 MOD SED SAME PHYS/QHP 5/>YRS: CPT

## 2023-07-18 RX ORDER — OMEPRAZOLE 10 MG/1
1 CAPSULE, DELAYED RELEASE ORAL
Refills: 0 | DISCHARGE

## 2023-07-18 RX ORDER — SODIUM CHLORIDE 9 MG/ML
1000 INJECTION INTRAMUSCULAR; INTRAVENOUS; SUBCUTANEOUS
Refills: 0 | Status: DISCONTINUED | OUTPATIENT
Start: 2023-07-18 | End: 2023-07-19

## 2023-07-18 RX ORDER — SODIUM CHLORIDE 9 MG/ML
250 INJECTION INTRAMUSCULAR; INTRAVENOUS; SUBCUTANEOUS ONCE
Refills: 0 | Status: COMPLETED | OUTPATIENT
Start: 2023-07-18 | End: 2023-07-18

## 2023-07-18 RX ORDER — GABAPENTIN 400 MG/1
1 CAPSULE ORAL
Refills: 0 | DISCHARGE

## 2023-07-18 RX ORDER — CHOLECALCIFEROL (VITAMIN D3) 125 MCG
1 CAPSULE ORAL
Qty: 0 | Refills: 0 | DISCHARGE

## 2023-07-18 RX ORDER — GABAPENTIN 400 MG/1
1 CAPSULE ORAL
Qty: 0 | Refills: 0 | DISCHARGE
Start: 2023-07-18

## 2023-07-18 RX ORDER — FINASTERIDE 5 MG/1
1 TABLET, FILM COATED ORAL
Refills: 0 | DISCHARGE

## 2023-07-18 RX ORDER — SODIUM CHLORIDE 9 MG/ML
150 INJECTION INTRAMUSCULAR; INTRAVENOUS; SUBCUTANEOUS
Refills: 0 | Status: DISCONTINUED | OUTPATIENT
Start: 2023-07-18 | End: 2023-07-19

## 2023-07-18 RX ORDER — FINASTERIDE 5 MG/1
5 TABLET, FILM COATED ORAL DAILY
Refills: 0 | Status: DISCONTINUED | OUTPATIENT
Start: 2023-07-18 | End: 2023-07-19

## 2023-07-18 RX ORDER — CLOPIDOGREL BISULFATE 75 MG/1
1 TABLET, FILM COATED ORAL
Qty: 90 | Refills: 3
Start: 2023-07-18 | End: 2024-07-11

## 2023-07-18 RX ORDER — GABAPENTIN 400 MG/1
300 CAPSULE ORAL
Refills: 0 | Status: DISCONTINUED | OUTPATIENT
Start: 2023-07-18 | End: 2023-07-18

## 2023-07-18 RX ORDER — PANTOPRAZOLE SODIUM 20 MG/1
40 TABLET, DELAYED RELEASE ORAL
Refills: 0 | Status: DISCONTINUED | OUTPATIENT
Start: 2023-07-18 | End: 2023-07-19

## 2023-07-18 RX ORDER — ASPIRIN/CALCIUM CARB/MAGNESIUM 324 MG
1 TABLET ORAL
Qty: 0 | Refills: 0 | DISCHARGE

## 2023-07-18 RX ORDER — CHOLECALCIFEROL (VITAMIN D3) 125 MCG
2000 CAPSULE ORAL DAILY
Refills: 0 | Status: DISCONTINUED | OUTPATIENT
Start: 2023-07-18 | End: 2023-07-19

## 2023-07-18 RX ORDER — EVOLOCUMAB 140 MG/ML
140 INJECTION, SOLUTION SUBCUTANEOUS
Refills: 0 | DISCHARGE

## 2023-07-18 RX ORDER — CLOPIDOGREL BISULFATE 75 MG/1
75 TABLET, FILM COATED ORAL DAILY
Refills: 0 | Status: DISCONTINUED | OUTPATIENT
Start: 2023-07-18 | End: 2023-07-19

## 2023-07-18 RX ORDER — GABAPENTIN 400 MG/1
400 CAPSULE ORAL
Refills: 0 | Status: DISCONTINUED | OUTPATIENT
Start: 2023-07-18 | End: 2023-07-19

## 2023-07-18 RX ORDER — ASPIRIN/CALCIUM CARB/MAGNESIUM 324 MG
81 TABLET ORAL DAILY
Refills: 0 | Status: DISCONTINUED | OUTPATIENT
Start: 2023-07-18 | End: 2023-07-19

## 2023-07-18 RX ORDER — LANOLIN ALCOHOL/MO/W.PET/CERES
5 CREAM (GRAM) TOPICAL AT BEDTIME
Refills: 0 | Status: DISCONTINUED | OUTPATIENT
Start: 2023-07-18 | End: 2023-07-19

## 2023-07-18 RX ADMIN — SODIUM CHLORIDE 75 MILLILITER(S): 9 INJECTION INTRAMUSCULAR; INTRAVENOUS; SUBCUTANEOUS at 07:37

## 2023-07-18 RX ADMIN — GABAPENTIN 400 MILLIGRAM(S): 400 CAPSULE ORAL at 21:32

## 2023-07-18 RX ADMIN — SODIUM CHLORIDE 500 MILLILITER(S): 9 INJECTION INTRAMUSCULAR; INTRAVENOUS; SUBCUTANEOUS at 07:34

## 2023-07-18 RX ADMIN — Medication 5 MILLIGRAM(S): at 22:23

## 2023-07-18 RX ADMIN — SODIUM CHLORIDE 100 MILLILITER(S): 9 INJECTION INTRAMUSCULAR; INTRAVENOUS; SUBCUTANEOUS at 10:09

## 2023-07-18 NOTE — DISCHARGE NOTE PROVIDER - NSDCFUSCHEDAPPT_GEN_ALL_CORE_FT
Raymundo Rai  [facility]  PULED [address] [place] [address]  Scheduled Appointment: 09/19/2023    Héctor Rea  [facility]  CARDIOLOGY [address] [place] [address]   Scheduled Appointment: 10/02/2023

## 2023-07-18 NOTE — ASU PATIENT PROFILE, ADULT - FALL HARM RISK - UNIVERSAL INTERVENTIONS
Bed in lowest position, wheels locked, appropriate side rails in place/Call bell, personal items and telephone in reach/Instruct patient to call for assistance before getting out of bed or chair/Non-slip footwear when patient is out of bed/[place] to call system/Physically safe environment - no spills, clutter or unnecessary equipment/Purposeful Proactive Rounding/Room/bathroom lighting operational, light cord in reach

## 2023-07-18 NOTE — DISCHARGE NOTE PROVIDER - CARE PROVIDER_API CALL
Héctor Rea  Cardiology  [address] [facility], [address]  [place] [address]  Phone: [phone]  Fax: [phone]  Follow Up Time: 2 weeks

## 2023-07-18 NOTE — DISCHARGE NOTE PROVIDER - NSDCMRMEDTOKEN_GEN_ALL_CORE_FT
aspirin 81 mg oral tablet: 1 orally once a day  finasteride 5 mg oral tablet: 1 orally once a day  gabapentin 300 mg oral capsule: 1 orally 2 times a day  omeprazole 20 mg oral delayed release capsule: 1 orally once a day  Plavix 75 mg oral tablet: 1 tab(s) orally once a day  Repatha SureClick 140 mg/mL subcutaneous solution: 140 subcutaneously every 2 weeks  Vitamin D3 2000 intl units oral capsule: 1 cap(s) orally once a day   aspirin 81 mg oral tablet: 1 orally once a day  finasteride 5 mg oral tablet: 1 orally once a day  gabapentin 400 mg oral capsule: 1 cap(s) orally 2 times a day  melatonin 5 mg oral tablet: 2 tab(s) orally once a day (at bedtime)  omeprazole 20 mg oral delayed release capsule: 1 orally once a day  Plavix 75 mg oral tablet: 1 tab(s) orally once a day  Repatha SureClick 140 mg/mL subcutaneous solution: 140 subcutaneously every 2 weeks  Vitamin D3 2000 intl units oral capsule: 1 cap(s) orally once a day

## 2023-07-18 NOTE — ASU DISCHARGE PLAN (ADULT/PEDIATRIC) - ASU DC SPECIAL INSTRUCTIONSFT

## 2023-07-18 NOTE — H&P CARDIOLOGY - HISTORY OF PRESENT ILLNESS
82 y/o M with Pmhx of HTN, HLD, mitral regurgitation, palpitations, CAD s/p stents (bare metal) of the proximal and mid RCA in February 2002. He gets occasional dyspnea on exertion    7/10/23: CT Coronaries:  Coronary arteries:  Coronary artery calcium Agatston score:  Left main (LM) coronary artery:  7  Left anterior descending (LAD) coronary artery:  496  Left circumflex (LCX) coronary artery:  104  Right coronary artery (RCA):  173 (between and distal to the stents)  Right-dominant coronary circulation.  The LM coronary artery has mild (<30%) noncalcified and calcified plaque.  The LAD coronary artery has significant calcified plaque in the proximal segment resulting in blooming artifact; assessment of luminal stenosis severity in the proximal segment is indeterminate. The LAD has possible severe (>70%) mixed noncalcified and calcified plaque in the mid segment (just distal to the first septal  branch). No significant   luminal stenosis detected in the distal segment. The diagonal branch has mild (30-49%) noncalcified and calcified plaque.  The LCX coronary artery has minimal (<30%) noncalcified plaque in the short proximal segment. The distal segment is small. The relatively large first obtuse marginal (OM) branch has foci of calcified plaque resulting in blooming artifact; the first OM branch has probably mild (30-49%) calcified plaque. A small second OM branch is present.  The RCA has minimal (<30%) calcified plaque in the proximal segment (proximal to the stent) and a stent of indeterminate patency in the proximal segment. The RCA has focal calcified plaque of indeterminate luminal stenosis severity in the mid segment (blooming artifact) and probably a patent stent in the mid segment. The RCA has mild (30-49%)   noncalcified and calcified plaque in the distal segment. The right posterior descending artery (PDA) has minimal (<30%) noncalcified and calcified plaque. The right posterolateral (RPL) branch has mild (30-49%) noncalcified and calcified plaque.  Aorta:  No thoracic aortic dissection noted in the visualized thoracic aorta. Mild noncalcified and calcified plaque is present in the imaged aorta.The aortic root measures 39.2 mm at the sinuses of Valsalva in maximum cusp to cusp dimension on systolic imaging.  Pericardium:  There is no pericardial effusion.  Chambers:  The left atrium is qualitatively dilated. No filling defect noted in the left atrial appendage.  Aortic valve:  Mild aortic valve calcification noted.  Non-cardiac:  COMPARISON: CT chest 6/19/2023.  The calcified right upper lobe nodule is unchanged. The additional nodules are not visualized secondary to differences in the scan field-of-view. The remainder of the imaged lungs are clear. The imaged airways are normal. The included aorta is normal in caliber. Hiatal hernia. Within the left lobe of the liver are hypodense hepatic lesions,   largest of which measures 2.1 x 2.0 cm. There is additional partially imaged 0.8 x 0.8 cm hypodense right renal lesion most compatible with a cyst. Spondylosis of the imaged thoracic spine. The visualized soft tissues are unremarkable  IMPRESSION:  Fractional flow reserve-computed tomography will be performed to assess the hemodynamic significance of the plaque-stenosis in the proximal and mid LAD.      7/11/23 FFR-CT  Impression:  Left main (LM) coronary artery Normal FFR-CT assessment of the LM indicates a low likelihood of hemodynamically significant coronary artery disease.  Left anterior descending (LAD) coronary artery Abnormal FFR-CT measurement <0.5 in the LAD corresponds to  plaque-stenosis in the mid segment and indicates a high probability of   hemodynamically significant coronary artery disease.  Left circumflex (LCX) coronary artery Normal FFR-CT assessment of the analyzable segments of the LCX indicates a low likelihood of hemodynamically significant coronary artery disease.  Right coronary artery (RCA)  Stents in the RCA preclude FFR-CT analysis.  Summary of FFR-CT values  a.  > 0.80 (distal to stenosis): Low likelihood of plaque-specific  hemodynamic significance  b.  < or = 0.80 (distal to stenosis): High likelihood of plaque-specific hemodynamic significance  c.  Assessment of plaque-specific hemodynamic significance by FFR-CT  should rely on FFR-CT values measured approximately 10-15 mm distal to the plaque of interest, rather than from the terminal vessel. FFR-CT values < 0.80 measured from the terminal vessel do not necessarily indicate plaque-specific hemodynamic significance, particularly if there   is only mild disease in the more proximal segments of the vessel.        11/11/2011 Cath:   Continue current medical therapy; pulmonary consultation (Dr Raymundo Rai) in context of SOB, elevated D dimer, and no apparent cardiac etiology of the symptoms.   Impressions: Patent RCA stents, with otherwise mild to moderate, nonobstructive CAD. LV function on a recent echo was reportedly normal.  Prepared and signed by Dudley Luna M.D. 11/11/2011 12:11:51           84 y/o M with Pmhx of HTN, HLD, mitral regurgitation, palpitations, CAD s/p stents (bare metal) of the proximal and mid RCA in February 2002. He gets occasional dyspnea on exertion. He was referred by Dr. Héctor Rea and presents today for cardiac angiogram.    7/10/23: CT Coronaries:  Coronary arteries:  Coronary artery calcium Agatston score:  Left main (LM) coronary artery:  7  Left anterior descending (LAD) coronary artery:  496  Left circumflex (LCX) coronary artery:  104  Right coronary artery (RCA):  173 (between and distal to the stents)  Right-dominant coronary circulation.  The LM coronary artery has mild (<30%) noncalcified and calcified plaque.  The LAD coronary artery has significant calcified plaque in the proximal segment resulting in blooming artifact; assessment of luminal stenosis severity in the proximal segment is indeterminate. The LAD has possible severe (>70%) mixed noncalcified and calcified plaque in the mid segment (just distal to the first septal  branch). No significant   luminal stenosis detected in the distal segment. The diagonal branch has mild (30-49%) noncalcified and calcified plaque.  The LCX coronary artery has minimal (<30%) noncalcified plaque in the short proximal segment. The distal segment is small. The relatively large first obtuse marginal (OM) branch has foci of calcified plaque resulting in blooming artifact; the first OM branch has probably mild (30-49%) calcified plaque. A small second OM branch is present.  The RCA has minimal (<30%) calcified plaque in the proximal segment (proximal to the stent) and a stent of indeterminate patency in the proximal segment. The RCA has focal calcified plaque of indeterminate luminal stenosis severity in the mid segment (blooming artifact) and probably a patent stent in the mid segment. The RCA has mild (30-49%)   noncalcified and calcified plaque in the distal segment. The right posterior descending artery (PDA) has minimal (<30%) noncalcified and calcified plaque. The right posterolateral (RPL) branch has mild (30-49%) noncalcified and calcified plaque.  Aorta:  No thoracic aortic dissection noted in the visualized thoracic aorta. Mild noncalcified and calcified plaque is present in the imaged aorta.The aortic root measures 39.2 mm at the sinuses of Valsalva in maximum cusp to cusp dimension on systolic imaging.  Pericardium:  There is no pericardial effusion.  Chambers:  The left atrium is qualitatively dilated. No filling defect noted in the left atrial appendage.  Aortic valve:  Mild aortic valve calcification noted.  Non-cardiac:  COMPARISON: CT chest 6/19/2023.  The calcified right upper lobe nodule is unchanged. The additional nodules are not visualized secondary to differences in the scan field-of-view. The remainder of the imaged lungs are clear. The imaged airways are normal. The included aorta is normal in caliber. Hiatal hernia. Within the left lobe of the liver are hypodense hepatic lesions,   largest of which measures 2.1 x 2.0 cm. There is additional partially imaged 0.8 x 0.8 cm hypodense right renal lesion most compatible with a cyst. Spondylosis of the imaged thoracic spine. The visualized soft tissues are unremarkable  IMPRESSION:  Fractional flow reserve-computed tomography will be performed to assess the hemodynamic significance of the plaque-stenosis in the proximal and mid LAD.      7/11/23 FFR-CT  Impression:  Left main (LM) coronary artery Normal FFR-CT assessment of the LM indicates a low likelihood of hemodynamically significant coronary artery disease.  Left anterior descending (LAD) coronary artery Abnormal FFR-CT measurement <0.5 in the LAD corresponds to  plaque-stenosis in the mid segment and indicates a high probability of   hemodynamically significant coronary artery disease.  Left circumflex (LCX) coronary artery Normal FFR-CT assessment of the analyzable segments of the LCX indicates a low likelihood of hemodynamically significant coronary artery disease.  Right coronary artery (RCA)  Stents in the RCA preclude FFR-CT analysis.  Summary of FFR-CT values  a.  > 0.80 (distal to stenosis): Low likelihood of plaque-specific  hemodynamic significance  b.  < or = 0.80 (distal to stenosis): High likelihood of plaque-specific hemodynamic significance  c.  Assessment of plaque-specific hemodynamic significance by FFR-CT  should rely on FFR-CT values measured approximately 10-15 mm distal to the plaque of interest, rather than from the terminal vessel. FFR-CT values < 0.80 measured from the terminal vessel do not necessarily indicate plaque-specific hemodynamic significance, particularly if there   is only mild disease in the more proximal segments of the vessel.        11/11/2011 Cath:   Continue current medical therapy; pulmonary consultation (Dr Raymundo Rai) in context of SOB, elevated D dimer, and no apparent cardiac etiology of the symptoms.   Impressions: Patent RCA stents, with otherwise mild to moderate, nonobstructive CAD. LV function on a recent echo was reportedly normal.  Prepared and signed by Dudley Luna M.D. 11/11/2011 12:11:51      Cardiologist: Dr. Héctor Rea

## 2023-07-18 NOTE — DISCHARGE NOTE PROVIDER - NSDCCPTREATMENT_GEN_ALL_CORE_FT
PRINCIPAL PROCEDURE  Procedure: Placement of coronary artery stent  Findings and Treatment: One stent to the mid LAD. Continue DAPT with aspirin, Plavix.

## 2023-07-18 NOTE — DISCHARGE NOTE PROVIDER - NSDCCPCAREPLAN_GEN_ALL_CORE_FT
PRINCIPAL DISCHARGE DIAGNOSIS  Diagnosis: CAD (coronary artery disease)  Assessment and Plan of Treatment: Do not stop your aspirin or Plavix unless instructed to do so by your cardiologist, they help keep your stented arteries open.   No heavy lifting or pushing/pulling with procedure arm for 2 weeks. No driving for 2 days. You may shower 24 hours following the procedure but avoid baths/swimming for 1 week. Check your wrist site for bleeding and/or swelling daily following procedure and call your doctor immediately if it occurs or if you experience increased pain at the site. Follow up with your cardiologist in 1-2 weeks. You may call [facility] if you have any questions/concerns regarding your procedure [phone].      SECONDARY DISCHARGE DIAGNOSES  Diagnosis: HTN (hypertension)  Assessment and Plan of Treatment: Continue with your blood pressure medications; eat a heart healthy diet with low salt diet; exercise regularly (consult with your physician or cardiologist first); maintain a heart healthy weight; if you smoke - quit (A resource to help you stop smoking is the [facility] Tip or Skip for Tobacco Control – phone number [phone].); include healthy ways to manage stress. Continue to follow with your primary care physician or cardiologist.    Diagnosis: HLD (hyperlipidemia)  Assessment and Plan of Treatment: Continue with your Repatha. Eat a heart healthy diet that is low in saturated fats and salt, and includes whole grains, fruits, vegetables and lean protein; exercise regularly (consult with your physician or cardiologist first); maintain a heart healthy weight; if you smoke - quit (A resource to help you stop smoking is the [facility] for Tobacco Control – phone number [phone].). Continue to follow with your primary physician or cardiologist.

## 2023-07-18 NOTE — DISCHARGE NOTE PROVIDER - HOSPITAL COURSE
HPI:  82 y/o M with Pmhx of HTN, HLD, mitral regurgitation, palpitations, CAD s/p stents (bare metal) of the proximal and mid RCA in February 2002. He gets occasional dyspnea on exertion. He was referred by Dr. Héctor Rea and presents today for cardiac angiogram.    7/18 cardiac cath with one stent to the mid LAD. Right radial artery site without swelling, bleeding.

## 2023-07-19 VITALS
RESPIRATION RATE: 17 BRPM | DIASTOLIC BLOOD PRESSURE: 86 MMHG | OXYGEN SATURATION: 99 % | HEART RATE: 62 BPM | SYSTOLIC BLOOD PRESSURE: 135 MMHG | TEMPERATURE: 98 F

## 2023-07-19 DIAGNOSIS — I25.10 ATHEROSCLEROTIC HEART DISEASE OF NATIVE CORONARY ARTERY WITHOUT ANGINA PECTORIS: ICD-10-CM

## 2023-07-19 DIAGNOSIS — I10 ESSENTIAL (PRIMARY) HYPERTENSION: ICD-10-CM

## 2023-07-19 DIAGNOSIS — E78.5 HYPERLIPIDEMIA, UNSPECIFIED: ICD-10-CM

## 2023-07-19 LAB
HCT VFR BLD CALC: 39.7 % — SIGNIFICANT CHANGE UP (ref 39–50)
HGB BLD-MCNC: 12.9 G/DL — LOW (ref 13–17)
MCHC RBC-ENTMCNC: 30.7 PG — SIGNIFICANT CHANGE UP (ref 27–34)
MCHC RBC-ENTMCNC: 32.5 GM/DL — SIGNIFICANT CHANGE UP (ref 32–36)
MCV RBC AUTO: 94.5 FL — SIGNIFICANT CHANGE UP (ref 80–100)
NRBC # BLD: 0 /100 WBCS — SIGNIFICANT CHANGE UP (ref 0–0)
PLATELET # BLD AUTO: 192 K/UL — SIGNIFICANT CHANGE UP (ref 150–400)
RBC # BLD: 4.2 M/UL — SIGNIFICANT CHANGE UP (ref 4.2–5.8)
RBC # FLD: 12.9 % — SIGNIFICANT CHANGE UP (ref 10.3–14.5)
WBC # BLD: 6.69 K/UL — SIGNIFICANT CHANGE UP (ref 3.8–10.5)
WBC # FLD AUTO: 6.69 K/UL — SIGNIFICANT CHANGE UP (ref 3.8–10.5)

## 2023-07-19 PROCEDURE — C1887: CPT

## 2023-07-19 PROCEDURE — C1753: CPT

## 2023-07-19 PROCEDURE — 92978 ENDOLUMINL IVUS OCT C 1ST: CPT | Mod: LD

## 2023-07-19 PROCEDURE — 85027 COMPLETE CBC AUTOMATED: CPT

## 2023-07-19 PROCEDURE — C9600: CPT

## 2023-07-19 PROCEDURE — 99238 HOSP IP/OBS DSCHRG MGMT 30/<: CPT

## 2023-07-19 PROCEDURE — 93454 CORONARY ARTERY ANGIO S&I: CPT | Mod: 59

## 2023-07-19 PROCEDURE — 80048 BASIC METABOLIC PNL TOTAL CA: CPT

## 2023-07-19 PROCEDURE — C1874: CPT

## 2023-07-19 PROCEDURE — 85025 COMPLETE CBC W/AUTO DIFF WBC: CPT

## 2023-07-19 PROCEDURE — 93005 ELECTROCARDIOGRAM TRACING: CPT

## 2023-07-19 PROCEDURE — C1769: CPT

## 2023-07-19 PROCEDURE — 92928 PRQ TCAT PLMT NTRAC ST 1 LES: CPT

## 2023-07-19 PROCEDURE — C1894: CPT

## 2023-07-19 RX ORDER — LANOLIN ALCOHOL/MO/W.PET/CERES
2 CREAM (GRAM) TOPICAL
Qty: 0 | Refills: 0 | DISCHARGE
Start: 2023-07-19

## 2023-07-19 RX ADMIN — CLOPIDOGREL BISULFATE 75 MILLIGRAM(S): 75 TABLET, FILM COATED ORAL at 05:51

## 2023-07-19 RX ADMIN — Medication 81 MILLIGRAM(S): at 05:51

## 2023-07-19 NOTE — DISCHARGE NOTE NURSING/CASE MANAGEMENT/SOCIAL WORK - PATIENT PORTAL LINK FT
You can access the FollowMyHealth Patient Portal offered by [facility] by registering at the following website: [email or web address]. By joining [facility]’s FollowMyHealth portal, you will also be able to view your health information using other applications (apps) compatible with our system.

## 2023-07-19 NOTE — PROGRESS NOTE ADULT - SUBJECTIVE AND OBJECTIVE BOX
HPI:  82 y/o M with Pmhx of HTN, HLD, mitral regurgitation, palpitations, CAD s/p stents (bare metal) of the proximal and mid RCA in 2002. He gets occasional dyspnea on exertion. He was referred by Dr. Héctor Rea and presents today for cardiac angiogram.      Allergies    No Known Allergies    Intolerances        Medications:  aspirin enteric coated 81 milliGRAM(s) Oral daily  cholecalciferol 2000 Unit(s) Oral daily  clopidogrel Tablet 75 milliGRAM(s) Oral daily  finasteride 5 milliGRAM(s) Oral daily  gabapentin 400 milliGRAM(s) Oral two times a day  melatonin 5 milliGRAM(s) Oral at bedtime  pantoprazole    Tablet 40 milliGRAM(s) Oral before breakfast  sodium chloride 0.9%. 150 milliLiter(s) IV Continuous <Continuous>  sodium chloride 0.9%. 1000 milliLiter(s) IV Continuous <Continuous>      Vitals:  T(C): 36.7 (23 @ 21:20), Max: 36.7 (23 @ 21:20)  HR: 64 (23 @ 21:20) (55 - 76)  BP: 141/84 (23 @ 21:20) (128/55 - 159/89)  BP(mean): 101 (23 @ 21:20) (86 - 135)  RR: 16 (23 @ 21:20) (15 - 18)  SpO2: 97% (23 @ 21:20) (95% - 100%)  Wt(kg): --  Daily Height in cm: 175.26 (2023 07:01)    Daily Weight in k.2 (2023 06:52)  I&O's Summary    2023 07:01  -  2023 01:12  --------------------------------------------------------  IN: 0 mL / OUT: 302 mL / NET: -302 mL          Physical Exam:  Appearance: Normal  Eyes: PERRL, EOMI  HENT: Normal oral muscosa, NC/AT  Cardiovascular: S1S2, RRR, No M/R/G, no JVD, No Lower extremity edema  Procedural Access Site: No hematoma, Non-tender to palpation, 2+ pulse, No bruit, No Ecchymosis  Respiratory: Clear to auscultation bilaterally  Gastrointestinal: Soft, Non tender, Normal Bowel Sounds  Musculoskeletal: No clubbing, No joint deformity   Neurologic: Non-focal  Lymphatic: No lymphadenopathy  Psychiatry: AAOx3, Mood & affect appropriate  Skin: No rashes, No ecchymoses, No cyanosis        143  |  107  |  25<H>  ----------------------------<  96  4.1   |  21<L>  |  1.27    Ca    8.7      2023 07:24              Lipid panel   Hgb A1c                         13.3   4.82  )-----------( 204      ( 2023 07:24 )             42.0         ECG: SR 66 bpm        Cardiologist: Dr. Héctor Rea     (2023 06:52)

## 2023-07-19 NOTE — PROGRESS NOTE ADULT - SUPERVISING ATTENDING
Dr. 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

## 2023-07-24 ENCOUNTER — NON-APPOINTMENT (OUTPATIENT)
Age: 83
End: 2023-07-24

## 2023-07-24 ENCOUNTER — APPOINTMENT (OUTPATIENT)
Dept: CARDIOLOGY | Facility: CLINIC | Age: 83
End: 2023-07-24
Payer: MEDICARE

## 2023-07-24 VITALS
WEIGHT: 190 LBS | OXYGEN SATURATION: 98 % | HEIGHT: 69 IN | RESPIRATION RATE: 16 BRPM | BODY MASS INDEX: 28.14 KG/M2 | TEMPERATURE: 97.8 F | SYSTOLIC BLOOD PRESSURE: 129 MMHG | DIASTOLIC BLOOD PRESSURE: 80 MMHG | HEART RATE: 70 BPM

## 2023-07-24 PROCEDURE — 99214 OFFICE O/P EST MOD 30 MIN: CPT

## 2023-07-24 PROCEDURE — 93000 ELECTROCARDIOGRAM COMPLETE: CPT

## 2023-07-24 NOTE — ASSESSMENT
[FreeTextEntry1] : Prior note nurse practitioner Enrique May 26, 2023::\par \par This is a 83-year-old male with a past medical history significant for coronary artery disease, s/p  stent to right coronary artery, hypertension, hyperlipidemia, statin intolerance, mitral valve regurgitation, comes in for followup cardiac evaluation. He is s/p b/l venous ablation on January 7, 2020 with Dr. Brooks. \par \par He is feeling generally well today and denies chest pain, dizziness, heart palpitations, recent episodes of syncope or falls, SOB, or dyspnea at this time.\par \par CHIEF COMPLAINT:\par Today he is feeling generally well and does not have any complaints at this time.  He is here to go over the results of his nuclear stress test and for regular follow-up appointment.\par \par He is currently prescribed Zetia 10mg  PO Daily and Repatha 140 mg/inj tolerating well.\par \par Patient states he is not on any antiplatelet therapy at this time.  He is not on aspirin therapy despite having history of coronary artery stents.  He is refusing to be on aspirin since he has a history for hematuria.  He will ask his urologist if he may be a candidate for Plavix.\par \par He denies fever, chills, weight loss, malaise, rash, alteration bowel habits, weakness, abdominal  pain, bloating, changes in urination, visual disturbances, chest pain, headaches, dizziness, heart palpitations, recent episodes of syncope or falls at this time.\par \par BLOOD PRESSURE:\par -BP is borderline elevated on today's exam.  He states his readings are normally within normal ranges and that he is never had high blood pressure.\par \par BLOOD WORK:\par -New blood work was done 04/25/2023 to evaluate lipid profile, CBC, BMP, hepatic function, A1C and TSH\par \par TESTING/REPORTS:\par -The patient had an abnormal nuclear stress test which was done on May 16th 2023 which demonstrated small, mild defects in distal inferior and inferior apical walls that are reversible consistent with ischemia.  There are small, moderate defects in the basal inferior, basal inferior lateral walls that were fixed consistent with infarct.\par *At this time during our visit the patient is not symptomatic and does not have any chest pain or shortness of breath.  He states that he played golf all day yesterday and was feeling well otherwise.  He does have a history of 2 coronary artery stents however is not on antiplatelet therapy.\par -EKG done Apr 25, 2023 which demonstrated regular sinus rhythm with nonspecific ST-T wave changes, BPM of 63.\par \par -Electrocardiogram done March 13, 2023 demonstrated normal sinus rhythm rate 65 bpm is otherwise remarkable for poor baseline with artifact.\par \par -Electrocardiogram done October 7, 2022 demonstrated normal sinus rhythm rate of 61 bpm is otherwise unremarkable.\par \par -Echo Doppler examination done July 17, 2022 demonstrated mild posterior mitral valve leaflet prolapse with moderate mitral valve regurgitation, minimal aortic valve regurgitation, mild tricuspid valve regurgitation, mild pulmonic valve regurgitation.\par \par -Electrocardiogram done November 3, 2021 demonstrate normal sinus rhythm rate 66 bpm is otherwise within normal limits.\par \par -Electrocardiogram done May 18, 2021 demonstrate normal sinus rhythm rate 62 bpm and is otherwise unremarkable.\par \par PMH:\par He had a cardiac catheterization done November 10, 2011 which revealed a 40% lesion in the left anterior descending artery, luminal irregularities in the distal left main artery and circumflex artery, 20% tubular stenosis at the site of the prior stent. Medical therapy was recommended at that time.\par He had a bare-metal stent placed in the proximal mid right coronary artery in February 2002.\par \par PLAN:\par -The patient will proceed with a CCTA to further evaluate his coronary artery anatomy.  He would like to have this done to make sure that he does not have any risk for potential MI/blockage.\par -He will continue with his usual medications and will contact the office if he is having any complaints between now and their next follow up appointment.\par -He was told that we will reevaluate his blood pressure in the future and that he may need antihypertensive medication if blood pressure does not improve.\par \par I have discussed the plan of care with . ROSCOE CRUZ  and he  will follow up in 3 months. He is compliant with all of his medications.\par The patient understands that aerobic exercises must be increased to at least 20 minutes 4 times/week along with maintaining lifestyle modifications including well-maintained diet. He will contact me at the office for any questions with their care or any changes in their health status.\par \par Adelita NP\par

## 2023-07-24 NOTE — DISCUSSION/SUMMARY
[FreeTextEntry1] : This is a 83-year-old male with past medical history significant for coronary artery disease, status post stent to right coronary artery, hypertension, hyperlipidemia, statin intolerance, mitral valve regurgitation, who comes in for followup cardiac evaluation.\par He denies chest pain, palpitations, dizziness or syncope. \par The patient had an abnormal nuclear stress test in coronary CTA.\par Cardiac catheterization done July 18, 2023 demonstrated a 99% lesion to the mid left anterior descending artery, minor irregularities in the left circumflex artery and first obtuse marginal artery, right coronary artery and right posterior descending artery.\par He received a drug-eluting stent to his LAD lesion.  He was discharged on Plavix and aspirin\par The patient reports hematuria and clots when self catheterizing twice a day.\par I have explained to him that he needs to be on dual antiplatelet therapy especially acutely in the first 48 weeks after stent.\par I have asked him to contact his urologist to see if there is something that can be done and find a source of the bleeding.\par He will follow-up with me in 1 month.  In the meantime we will discontinue his Prilosec and start on Protonix 20 mg daily due to interaction with Plavix.\par Electrocardiogram done July 24, 2023 demonstrated normal sinus rhythm rate 70 bpm is otherwise remarkable for 2 premature ventricular contractions.\par The patient is also complaining of some muscle aches on Zetia.  I have told him this is unlikely etiology of his muscle aches.  In the meantime he will discontinue Zetia for now.  He will continue on Repatha therapy.\par Lipid panel done April 25, 2023 demonstrated cholesterol 134, HDL 58, triglycerides 164, LDL direct 49 mg/dL, non-HDL cholesterol 76 mg/dL.  Hemoglobin A1c of 6.0.\par Electrocardiogram done March 13, 2023 demonstrated normal sinus rhythm rate 65 bpm is otherwise remarkable for poor baseline with artifact.\par Electrocardiogram done October 7, 2022 demonstrated normal sinus rhythm rate of 61 bpm is otherwise unremarkable.\par Echo Doppler examination done July 17, 2022 demonstrated mild posterior mitral valve leaflet prolapse with moderate mitral valve regurgitation, minimal aortic valve regurgitation, mild tricuspid valve regurgitation, mild pulmonic valve regurgitation.\par Electrocardiogram done November 3, 2021 demonstrate normal sinus rhythm rate 66 bpm is otherwise within normal limits.\par \par Electrocardiogram done May 18, 2021 demonstrate normal sinus rhythm rate 62 bpm and is otherwise unremarkable.\par \par I have asked him to follow-up in 6 weeks to recheck his blood pressure.  He reports his blood pressure is within normal limits and on the doctor's offices.\par \par Lipid panel done October 8, 2022 demonstrated a total cholesterol 164, HDL 58, LDL direct of 87 mg/dL non-HDL cholesterol 106 mg/dL with triglycerides 105 mg/dL.  This will also be repeated at the time of next visit when he really starts Repatha therapy.\par \par The patient will continue on his usual medications.  He will have new blood work done today for electrolytes and lipid panel.\par \par He will continue on his current dose of Zetia 10 mg daily.\par \par Blood work done November 3, 2021 demonstrated direct LDL of 77 mg/dL with total cholesterol 162 mg/dL.  The patient's target LDL is less than 70 mg/dL.  I have recommended he add Zetia 10 mg daily.  I do not believe his previous symptoms on Zetia related to the Zetia treatment.\par He will have blood work done and 8 to 10 weeks.\par \par Electrocardiogram done November 3, 2021 demonstrate normal sinus rhythm rate 66 bpm is otherwise within normal limits.\par \par Electrocardiogram done May 18, 2021 demonstrate normal sinus rhythm rate 62 bpm and is otherwise unremarkable.\par \par PMH:\par The patient reports that the Zetia was making him feel groggy.  I have explained to him that I feel his gabapentin 3 times a day is more likely causing that side effect.\par His LDL cholesterol needs to be less than 70 mg/dL.\par The patient also complains of bilateral cramping in his calf muscles when walking.  He is no longer on Crestor 5 mg/day.  However given his risk profile, the patient will have an ankle-brachial index done today.\par .He had recent venous ablation done January 7, 2020 by vascular surgery.  This was complicated by localized infection.  He will followup with his vascular surgeon.\par Lifestyle modification was reinforced.\par The patient was on Praluent because of statin intolerance.\par \par PMH:\par He had a cardiac catheterization done November 10, 2011 which revealed a 40% lesion in the left anterior descending artery, luminal irregularities in the distal left main artery and circumflex artery, 20% tubular stenosis at the site of the prior stent. Medical therapy was recommended at that time.\par He had a bare-metal stent placed in the proximal mid right coronary artery in February 2002.\par \par The patient understands that aerobic exercises must be increased to 40 minutes 4 times per week. A detailed discussion of lifestyle modification was done today. The patient has a good understanding of the diagnosis, and treatment plan. Lifestyle modification was also outlined.

## 2023-07-24 NOTE — PHYSICAL EXAM
[Carotid Bruit] : carotid bruit [General Appearance - Well Developed] : well developed [Normal Appearance] : normal appearance [Well Groomed] : well groomed [General Appearance - Well Nourished] : well nourished [No Deformities] : no deformities [General Appearance - In No Acute Distress] : no acute distress [Normal Conjunctiva] : the conjunctiva exhibited no abnormalities [Normal Oral Mucosa] : normal oral mucosa [No Oral Pallor] : no oral pallor [Normal Oropharynx] : normal oropharynx [Normal Jugular Venous A Waves Present] : normal jugular venous A waves present [Normal Jugular Venous V Waves Present] : normal jugular venous V waves present [No Jugular Venous Jewell A Waves] : no jugular venous jewell A waves [Respiration, Rhythm And Depth] : normal respiratory rhythm and effort [Exaggerated Use Of Accessory Muscles For Inspiration] : no accessory muscle use [Auscultation Breath Sounds / Voice Sounds] : lungs were clear to auscultation bilaterally [Bowel Sounds] : normal bowel sounds [Abdomen Soft] : soft [Abdomen Tenderness] : non-tender [Abnormal Walk] : normal gait [Nail Clubbing] : no clubbing of the fingernails [Cyanosis, Localized] : no localized cyanosis [Petechial Hemorrhages (___cm)] : no petechial hemorrhages [Skin Color & Pigmentation] : normal skin color and pigmentation [Skin Turgor] : normal skin turgor [] : no rash [No Venous Stasis] : no venous stasis [Oriented To Time, Place, And Person] : oriented to person, place, and time [Impaired Insight] : insight and judgment were intact [Affect] : the affect was normal [No Anxiety] : not feeling anxious [5th Left ICS - MCL] : palpated at the 5th LICS in the midclavicular line [Normal] : normal [No Precordial Heave] : no precordial heave was noted [Normal Rate] : normal [Rhythm Regular] : regular [Normal S1] : normal S1 [Normal S2] : normal S2 [No Gallop] : no gallop heard [No Murmur] : no murmurs heard [II] : a grade 2 [I] : a grade 1 [2+] : left 2+ [No Abnormalities] : the abdominal aorta was not enlarged and no bruit was heard [No Pitting Edema] : no pitting edema present [Rt] : varicose veins of the right leg noted [Lt] : varicose veins of the left leg noted [de-identified] : left side possible bruit or transmitted murmur. [Apical Thrill] : no thrill palpable at the apex [S3] : no S3 [S4] : no S4 [Click] : no click [Pericardial Rub] : no pericardial rub [Right Carotid Bruit] : no bruit heard over the right carotid [Left Carotid Bruit] : no bruit heard over the left carotid [Right Femoral Bruit] : no bruit heard over the right femoral artery [Left Femoral Bruit] : no bruit heard over the left femoral artery

## 2023-08-04 ENCOUNTER — APPOINTMENT (OUTPATIENT)
Dept: NEUROSURGERY | Facility: CLINIC | Age: 83
End: 2023-08-04
Payer: MEDICARE

## 2023-08-04 VITALS
OXYGEN SATURATION: 98 % | WEIGHT: 190 LBS | HEIGHT: 69 IN | HEART RATE: 70 BPM | DIASTOLIC BLOOD PRESSURE: 79 MMHG | SYSTOLIC BLOOD PRESSURE: 168 MMHG | BODY MASS INDEX: 28.14 KG/M2

## 2023-08-04 DIAGNOSIS — Z87.891 PERSONAL HISTORY OF NICOTINE DEPENDENCE: ICD-10-CM

## 2023-08-04 DIAGNOSIS — Z78.9 OTHER SPECIFIED HEALTH STATUS: ICD-10-CM

## 2023-08-04 PROCEDURE — 99213 OFFICE O/P EST LOW 20 MIN: CPT

## 2023-08-04 NOTE — REVIEW OF SYSTEMS
[Negative] : Heme/Lymph [Confused or Disoriented] : no confusion [Memory Lapses or Loss] : no memory loss [Hand Weakness] : no hand weakness [Leg Weakness] : no leg weakness [Poor Coordination] : good coordination [Numbness] : numbness [Tingling] : tingling [Difficulty Walking] : difficulty walking [Ataxia] : no ataxia [Frequent Falls] : not falling [Limping] : not limping [de-identified] : lower back pain

## 2023-08-04 NOTE — CONSULT LETTER
[Dear  ___] : Dear  [unfilled], [Courtesy Letter:] : I had the pleasure of seeing your patient, [unfilled], in my office today. [Sincerely,] : Sincerely, [FreeTextEntry2] : Teo Navarro MD [address] [place], [place] [id number]  [FreeTextEntry1] : The patient reports a 5-year history of lower back pain.  He does not report any right leg pain however a few years ago he had burning dysesthesias of his right foot, he was diagnosed with neuropathy, and placed on gabapentin.  There was no electric type shocks in his right leg.  The back pain is described as a" jabbing" with tightness.  The back pain is severe at times and reported as a 5/10.  The pain does increase with positional changes.  The patient reports neurogenic claudication symptoms and is unable to walk half a block without experiencing pain.  There is no bowel or bladder changes, however he is under the care of urology for urinary retention and self caths due to an enalrged prostate.  No gait disturbances.  No falls of trips.  The patient does keep active and plays golf routinely.  Recently he had a coronary stent placed he is now on concomitant Plavix and aspirin on a daily basis.  The patient has undergone chiropractic therapy in the last year with no effect.  He does take Tylenol on a as needed basis which gives him some relief.  NSAID are contraindicated due to bleeding issues and increased prostate symptoms.     There are no images for review.  On neurologic examination, the patient is alert and oriented.  Appears well and in no distress.  Speech clear and and fluent.  No vibration, temperature, or sensory loss through out and pin prick normal.  No pronator drift.  Slight dysmetria of LE bilaterally.  Full strength in all muscle groups.  Reflexes are 1/4 in UE knee jerks, otherwise there are no ankle jerks.  RAFY are intact.  No abnormal extraneous movements.  No clonus.   Gait is unsteady.   The patient pitches forward upon ambulating.  He is unable to walk on heels and toes with out difficulty.  Negative straight leg testing bilaterally.    The patient has clinical presentation of neurogenic claudication most likely due to lumbar stenosis.  It is possible that the patient has a radicular component masked by the use of Gabapentin since an EMG has not been completed.  I have ordered an MRI of the lumbar spine to assess for advanced degenerative disc disease and possible spondylolisthesis.  A lumbar flexion-extension x-ray will be completed as well to assess for the same findings and evlaute for spondylolisthesis.  We discussed alternative treatment options and for now the patient will attend acupuncture sessions.  He was provided a prescription and contact information for Dr. Zelaya.  Cyclobenzaprine was prescribed, low-dose at 5 mg, 4 muscle tightness.  He was given strict instructions to take this medication only at night.  He may not drive on this medication and he is aware of the side effects discussed.  The patient will return and see Dr. Pathak in consultation as desired.  He will contact the office if he has any concerns of further progression of his lumbar symptoms.   Thank you for very kindly including me in the evaluation and treatment of your patient.  Please do not hesitate to contact me should you have any concerns or questions regarding the patients lower back pain and evaluation plan.  [FreeTextEntry3] : Kimberly Lombardo, DNP, NP Nurse Practitioner Neurosurgery [facility] [place] Assistant  [employer] [facility] [employer] email: [email or web address]  [address] P- [phone] F- [phone]

## 2023-08-22 ENCOUNTER — APPOINTMENT (OUTPATIENT)
Dept: MRI IMAGING | Facility: CLINIC | Age: 83
End: 2023-08-22
Payer: MEDICARE

## 2023-08-22 ENCOUNTER — OUTPATIENT (OUTPATIENT)
Dept: OUTPATIENT SERVICES | Facility: HOSPITAL | Age: 83
LOS: 1 days | End: 2023-08-22
Payer: MEDICARE

## 2023-08-22 ENCOUNTER — APPOINTMENT (OUTPATIENT)
Dept: RADIOLOGY | Facility: CLINIC | Age: 83
End: 2023-08-22
Payer: MEDICARE

## 2023-08-22 DIAGNOSIS — M54.50 LOW BACK PAIN, UNSPECIFIED: ICD-10-CM

## 2023-08-22 DIAGNOSIS — Z98.61 CORONARY ANGIOPLASTY STATUS: Chronic | ICD-10-CM

## 2023-08-22 DIAGNOSIS — Z98.890 OTHER SPECIFIED POSTPROCEDURAL STATES: Chronic | ICD-10-CM

## 2023-08-22 PROCEDURE — 72148 MRI LUMBAR SPINE W/O DYE: CPT

## 2023-08-22 PROCEDURE — 72148 MRI LUMBAR SPINE W/O DYE: CPT | Mod: 26,MH

## 2023-08-22 PROCEDURE — 72110 X-RAY EXAM L-2 SPINE 4/>VWS: CPT

## 2023-08-22 PROCEDURE — 72110 X-RAY EXAM L-2 SPINE 4/>VWS: CPT | Mod: 26

## 2023-08-29 ENCOUNTER — APPOINTMENT (OUTPATIENT)
Dept: CARDIOLOGY | Facility: CLINIC | Age: 83
End: 2023-08-29

## 2023-08-29 ENCOUNTER — RX RENEWAL (OUTPATIENT)
Age: 83
End: 2023-08-29

## 2023-08-30 ENCOUNTER — NON-APPOINTMENT (OUTPATIENT)
Age: 83
End: 2023-08-30

## 2023-09-07 RX ORDER — EZETIMIBE 10 MG/1
10 TABLET ORAL DAILY
Qty: 90 | Refills: 1 | Status: DISCONTINUED | COMMUNITY
Start: 2022-01-26 | End: 2023-09-07

## 2023-09-10 ENCOUNTER — EMERGENCY (EMERGENCY)
Facility: HOSPITAL | Age: 83
LOS: 1 days | Discharge: HOME | End: 2023-09-10
Attending: EMERGENCY MEDICINE | Admitting: EMERGENCY MEDICINE
Payer: MEDICARE

## 2023-09-10 VITALS
SYSTOLIC BLOOD PRESSURE: 144 MMHG | HEIGHT: 69 IN | OXYGEN SATURATION: 97 % | DIASTOLIC BLOOD PRESSURE: 77 MMHG | RESPIRATION RATE: 20 BRPM | HEART RATE: 96 BPM | TEMPERATURE: 100 F | WEIGHT: 190.04 LBS

## 2023-09-10 VITALS
SYSTOLIC BLOOD PRESSURE: 135 MMHG | TEMPERATURE: 99 F | HEART RATE: 95 BPM | RESPIRATION RATE: 16 BRPM | OXYGEN SATURATION: 95 %

## 2023-09-10 DIAGNOSIS — Z98.61 CORONARY ANGIOPLASTY STATUS: Chronic | ICD-10-CM

## 2023-09-10 DIAGNOSIS — Z98.890 OTHER SPECIFIED POSTPROCEDURAL STATES: Chronic | ICD-10-CM

## 2023-09-10 LAB
ALBUMIN SERPL ELPH-MCNC: 3.5 G/DL — SIGNIFICANT CHANGE UP (ref 3.3–5)
ALP SERPL-CCNC: 55 U/L — SIGNIFICANT CHANGE UP (ref 30–120)
ALT FLD-CCNC: 26 U/L — SIGNIFICANT CHANGE UP (ref 10–60)
ANION GAP SERPL CALC-SCNC: 9 MMOL/L — SIGNIFICANT CHANGE UP (ref 5–17)
APTT BLD: 28.7 SEC — SIGNIFICANT CHANGE UP (ref 24.5–35.6)
AST SERPL-CCNC: 18 U/L — SIGNIFICANT CHANGE UP (ref 10–40)
BASOPHILS # BLD AUTO: 0.04 K/UL — SIGNIFICANT CHANGE UP (ref 0–0.2)
BASOPHILS NFR BLD AUTO: 0.4 % — SIGNIFICANT CHANGE UP (ref 0–2)
BILIRUB SERPL-MCNC: 1.2 MG/DL — SIGNIFICANT CHANGE UP (ref 0.2–1.2)
BUN SERPL-MCNC: 24 MG/DL — HIGH (ref 7–23)
CALCIUM SERPL-MCNC: 8.8 MG/DL — SIGNIFICANT CHANGE UP (ref 8.4–10.5)
CHLORIDE SERPL-SCNC: 105 MMOL/L — SIGNIFICANT CHANGE UP (ref 96–108)
CK MB CFR SERPL CALC: 1.8 NG/ML — SIGNIFICANT CHANGE UP (ref 0–3.6)
CO2 SERPL-SCNC: 28 MMOL/L — SIGNIFICANT CHANGE UP (ref 22–31)
CREAT SERPL-MCNC: 1.4 MG/DL — HIGH (ref 0.5–1.3)
D DIMER BLD IA.RAPID-MCNC: 248 NG/ML DDU — HIGH
EGFR: 50 ML/MIN/1.73M2 — LOW
EOSINOPHIL # BLD AUTO: 0.06 K/UL — SIGNIFICANT CHANGE UP (ref 0–0.5)
EOSINOPHIL NFR BLD AUTO: 0.7 % — SIGNIFICANT CHANGE UP (ref 0–6)
GLUCOSE SERPL-MCNC: 115 MG/DL — HIGH (ref 70–99)
HCT VFR BLD CALC: 41 % — SIGNIFICANT CHANGE UP (ref 39–50)
HGB BLD-MCNC: 13.1 G/DL — SIGNIFICANT CHANGE UP (ref 13–17)
IMM GRANULOCYTES NFR BLD AUTO: 0.4 % — SIGNIFICANT CHANGE UP (ref 0–0.9)
INR BLD: 1.13 RATIO — SIGNIFICANT CHANGE UP (ref 0.85–1.18)
LYMPHOCYTES # BLD AUTO: 0.68 K/UL — LOW (ref 1–3.3)
LYMPHOCYTES # BLD AUTO: 7.6 % — LOW (ref 13–44)
MCHC RBC-ENTMCNC: 30.4 PG — SIGNIFICANT CHANGE UP (ref 27–34)
MCHC RBC-ENTMCNC: 32 GM/DL — SIGNIFICANT CHANGE UP (ref 32–36)
MCV RBC AUTO: 95.1 FL — SIGNIFICANT CHANGE UP (ref 80–100)
MONOCYTES # BLD AUTO: 0.72 K/UL — SIGNIFICANT CHANGE UP (ref 0–0.9)
MONOCYTES NFR BLD AUTO: 8.1 % — SIGNIFICANT CHANGE UP (ref 2–14)
NEUTROPHILS # BLD AUTO: 7.36 K/UL — SIGNIFICANT CHANGE UP (ref 1.8–7.4)
NEUTROPHILS NFR BLD AUTO: 82.8 % — HIGH (ref 43–77)
NRBC # BLD: 0 /100 WBCS — SIGNIFICANT CHANGE UP (ref 0–0)
PLATELET # BLD AUTO: 213 K/UL — SIGNIFICANT CHANGE UP (ref 150–400)
POTASSIUM SERPL-MCNC: 4.2 MMOL/L — SIGNIFICANT CHANGE UP (ref 3.5–5.3)
POTASSIUM SERPL-SCNC: 4.2 MMOL/L — SIGNIFICANT CHANGE UP (ref 3.5–5.3)
PROT SERPL-MCNC: 6.7 G/DL — SIGNIFICANT CHANGE UP (ref 6–8.3)
PROTHROM AB SERPL-ACNC: 12.6 SEC — SIGNIFICANT CHANGE UP (ref 9.5–13)
RBC # BLD: 4.31 M/UL — SIGNIFICANT CHANGE UP (ref 4.2–5.8)
RBC # FLD: 12.8 % — SIGNIFICANT CHANGE UP (ref 10.3–14.5)
SODIUM SERPL-SCNC: 142 MMOL/L — SIGNIFICANT CHANGE UP (ref 135–145)
TROPONIN I, HIGH SENSITIVITY RESULT: 10.6 NG/L — SIGNIFICANT CHANGE UP
WBC # BLD: 8.9 K/UL — SIGNIFICANT CHANGE UP (ref 3.8–10.5)
WBC # FLD AUTO: 8.9 K/UL — SIGNIFICANT CHANGE UP (ref 3.8–10.5)

## 2023-09-10 PROCEDURE — 71275 CT ANGIOGRAPHY CHEST: CPT | Mod: MA

## 2023-09-10 PROCEDURE — 99285 EMERGENCY DEPT VISIT HI MDM: CPT | Mod: FS

## 2023-09-10 PROCEDURE — 80053 COMPREHEN METABOLIC PANEL: CPT

## 2023-09-10 PROCEDURE — 85730 THROMBOPLASTIN TIME PARTIAL: CPT

## 2023-09-10 PROCEDURE — 85610 PROTHROMBIN TIME: CPT

## 2023-09-10 PROCEDURE — 99285 EMERGENCY DEPT VISIT HI MDM: CPT | Mod: 25

## 2023-09-10 PROCEDURE — 36415 COLL VENOUS BLD VENIPUNCTURE: CPT

## 2023-09-10 PROCEDURE — 84484 ASSAY OF TROPONIN QUANT: CPT

## 2023-09-10 PROCEDURE — 82553 CREATINE MB FRACTION: CPT

## 2023-09-10 PROCEDURE — 85025 COMPLETE CBC W/AUTO DIFF WBC: CPT

## 2023-09-10 PROCEDURE — 85379 FIBRIN DEGRADATION QUANT: CPT

## 2023-09-10 PROCEDURE — 71275 CT ANGIOGRAPHY CHEST: CPT | Mod: 26,MA

## 2023-09-10 PROCEDURE — 71045 X-RAY EXAM CHEST 1 VIEW: CPT | Mod: 26

## 2023-09-10 PROCEDURE — 93010 ELECTROCARDIOGRAM REPORT: CPT

## 2023-09-10 PROCEDURE — 71045 X-RAY EXAM CHEST 1 VIEW: CPT

## 2023-09-10 PROCEDURE — 93005 ELECTROCARDIOGRAM TRACING: CPT

## 2023-09-10 NOTE — ED ADULT NURSE NOTE - NSFALLUNIVINTERV_ED_ALL_ED
Bed/Stretcher in lowest position, wheels locked, appropriate side rails in place/Call bell, personal items and telephone in reach/Instruct patient to call for assistance before getting out of bed/chair/stretcher/Non-slip footwear applied when patient is off stretcher/[place] to call system/Physically safe environment - no spills, clutter or unnecessary equipment/Purposeful proactive rounding/Room/bathroom lighting operational, light cord in reach

## 2023-09-10 NOTE — ED ADULT TRIAGE NOTE - CHIEF COMPLAINT QUOTE
" I went to [facility] and I tested positive for Covid - prescribed me with Lagevrio 20mg . I have SOB and chest discomfort "

## 2023-09-10 NOTE — ED PROVIDER NOTE - CLINICAL SUMMARY MEDICAL DECISION MAKING FREE TEXT BOX
83-year-old male with past medical history of hyperlipidemia and CAD presents today from urgent care due to COVID.  Patient reports that he is experiencing a headache, runny nose, shortness of breath, and chest soreness.  Patient reports that he went to the urgent care today in which his COVID test came back positive.  Patient was prescribed Lagevrio at the urgent care.  Patient was discharged to  the medication but on his way to  the medication he was feeling short of breath to the urgent care advised him to go to the ER.  Patient reports he has no chest pain currently patient had stent placement 2 months ago.  Patient is currently on Plavix and aspirin.  Patient denies chest pain currently, hemoptysis, cough, leg swelling, palpitations, syncope, or any other complaints.    VSS Temp 100.1, NAD  HEENT - clear  PERRL EOMI  Neck supple  lungs clear  Cor S1S2 RR - MGR  Abd soft nontender, no mass or HSM, no rebound  Ext FROM intact, no edema  Neuro Intact, no deficits.  Skin Warm and dry no rash.    Imp- COVID-19  Plan - will get labs, CXR, EKG and reassess.

## 2023-09-10 NOTE — ED PROVIDER NOTE - NSFOLLOWUPINSTRUCTIONS_ED_ALL_ED_FT
COVID-19    COVID-19, or coronavirus disease 2019, is an infection that is caused by a new (novel) coronavirus called SARS-CoV-2. COVID-19 can cause many symptoms. In some people, the virus may not cause any symptoms. In others, it may cause mild or severe symptoms. Some people with severe infection develop severe disease.    What are the causes?  The human body, showing how the coronavirus travels from the air to a person's lungs.  This illness is caused by a virus. The virus may be in the air as tiny specks of fluid (aerosols) or droplets, or it may be on surfaces. You may catch the virus by:  Breathing in droplets from an infected person. Droplets can be spread by a person breathing, speaking, singing, coughing, or sneezing.  Touching something, like a table or a doorknob, that has virus on it (is contaminated) and then touching your mouth, nose, or eyes.  What increases the risk?  Risk for infection:    You are more likely to get infected with the COVID-19 virus if:  You are within 6 ft (1.8 m) of a person with COVID-19 for 15 minutes or longer.  You are providing care for a person who is infected with COVID-19.  You are in close personal contact with other people. Close personal contact includes hugging, kissing, or sharing eating or drinking utensils.  Risk for serious illness caused by COVID-19:    You are more likely to get seriously ill from the COVID-19 virus if:  You have cancer.  You have a long-term (chronic) disease, such as:  Chronic lung disease. This includes pulmonary embolism, chronic obstructive pulmonary disease, and cystic fibrosis.  Long-term disease that lowers your body's ability to fight infection (immunocompromise).  Serious cardiac conditions, such as heart failure, coronary artery disease, or cardiomyopathy.  Diabetes.  Chronic kidney disease.  Liver diseases. These include cirrhosis, nonalcoholic fatty liver disease, alcoholic liver disease, or autoimmune hepatitis.  You have obesity.  You are pregnant or were recently pregnant.  You have sickle cell disease.  What are the signs or symptoms?  Symptoms of this condition can range from mild to severe. Symptoms may appear any time from 2 to 14 days after being exposed to the virus. They include:  Fever or chills.  Shortness of breath or trouble breathing.  Feeling tired or very tired.  Headaches, body aches, or muscle aches.  Runny or stuffy nose, sneezing, coughing, or sore throat.  New loss of taste or smell. This is rare.  Some people may also have stomach problems, such as nausea, vomiting, or diarrhea.    Other people may not have any symptoms of COVID-19.    How is this diagnosed?  A sample being collected by swabbing the nose.  This condition may be diagnosed by testing samples to check for the COVID-19 virus. The most common tests are the PCR test and the antigen test. Tests may be done in the lab or at home. They include:  Using a swab to take a sample of fluid from the back of your nose and throat (nasopharyngeal fluid), from your nose, or from your throat.  Testing a sample of saliva from your mouth.  Testing a sample of coughed-up mucus from your lungs (sputum).  How is this treated?  Treatment for COVID-19 infection depends on the severity of the condition.  Mild symptoms can be managed at home with rest, fluids, and over-the-counter medicines.  Serious symptoms may be treated in a hospital intensive care unit (ICU). Treatment in the ICU may include:  Supplemental oxygen. Extra oxygen is given through a tube in the nose, a face mask, or a driscoll.  Medicines. These may include:  Antivirals, such as monoclonal antibodies. These help your body fight off certain viruses that can cause disease.  Anti-inflammatories, such as corticosteroids. These reduce inflammation and suppress the immune system.  Antithrombotics. These prevent or treat blood clots, if they develop.  Convalescent plasma. This helps boost your immune system, if you have an underlying immunosuppressive condition or are getting immunosuppressive treatments.  Prone positioning. This means you will lie on your stomach. This helps oxygen to get into your lungs.  Infection control measures.  If you are at risk for more serious illness caused by COVID-19, your health care provider may prescribe two long-acting monoclonal antibodies, given together every 6 months.    How is this prevented?  To protect yourself:    Use preventive medicine (pre-exposure prophylaxis). You may get pre-exposure prophylaxis if you have moderate or severe immunocompromise.  Get vaccinated. Anyone 6 months old or older who meets guidelines can get a COVID-19 vaccine or vaccine series. This includes people who are pregnant or making breast milk (lactating).  Get an added dose of COVID-19 vaccine after your first vaccine or vaccine series if you have moderate to severe immunocompromise. This applies if you have had a solid organ transplant or have been diagnosed with an immunocompromising condition.  You should get the added dose 4 weeks after you got the first COVID-19 vaccine or vaccine series.  If you get an mRNA vaccine, you will need a 3-dose primary series.  If you get the J&J/Yung vaccine, you will need a 2-dose primary series, with the second dose being an mRNA vaccine.  Talk to your health care provider about getting experimental monoclonal antibodies. This treatment is approved under emergency use authorization to prevent severe illness before or after being exposed to the COVID-19 virus. You may be given monoclonal antibodies if:  You have moderate or severe immunocompromise. This includes treatments that lower your immune response. People with immunocompromise may not develop protection against COVID-19 when they are vaccinated.  You cannot be vaccinated. You may not get a vaccine if you have a severe allergic reaction to the vaccine or its components.  You are not fully vaccinated.  You are in a facility where COVID-19 is present and:  Are in close contact with a person who is infected with the COVID-19 virus.  Are at high risk of being exposed to the COVID-19 virus.  You are at risk of illness from new variants of the COVID-19 virus.  To protect others:    If you have symptoms of COVID-19, take steps to prevent the virus from spreading to others.  Stay home. Leave your house only to get medical care. Do not use public transit, if possible.  Do not travel while you are sick.  Wash your hands often with soap and water for at least 20 seconds. If soap and water are not available, use alcohol-based hand .  Make sure that all people in your household wash their hands well and often.  Cough or sneeze into a tissue or your sleeve or elbow. Do not cough or sneeze into your hand or into the air.  Where to find more information  Centers for Disease Control and Prevention: www.cdc.gov/coronavirus  World Health Organization: www.who.int/health-topics/coronavirus  Get help right away if:  You have trouble breathing.  You have pain or pressure in your chest.  You are confused.  You have bluish lips and fingernails.  You have trouble waking from sleep.  You have symptoms that get worse.  These symptoms may be an emergency. Get help right away. Call 911.  Do not wait to see if the symptoms will go away.  Do not drive yourself to the hospital.  Summary  COVID-19 is an infection that is caused by a new coronavirus.  Sometimes, there are no symptoms. Other times, symptoms range from mild to severe. Some people with a severe COVID-19 infection develop severe disease.  The virus that causes COVID-19 can spread from person to person through droplets or aerosols from breathing, speaking, singing, coughing, or sneezing.  Mild symptoms of COVID-19 can be managed at home with rest, fluids, and over-the-counter medicines.  This information is not intended to replace advice given to you by your health care provider. Make sure you discuss any questions you have with your health care provider. Follow up with your primary care. Return for chest pain, shortness of breath, worsening condition.     COVID-19    COVID-19, or coronavirus disease 2019, is an infection that is caused by a new (novel) coronavirus called SARS-CoV-2. COVID-19 can cause many symptoms. In some people, the virus may not cause any symptoms. In others, it may cause mild or severe symptoms. Some people with severe infection develop severe disease.    What are the causes?  The human body, showing how the coronavirus travels from the air to a person's lungs.  This illness is caused by a virus. The virus may be in the air as tiny specks of fluid (aerosols) or droplets, or it may be on surfaces. You may catch the virus by:  Breathing in droplets from an infected person. Droplets can be spread by a person breathing, speaking, singing, coughing, or sneezing.  Touching something, like a table or a doorknob, that has virus on it (is contaminated) and then touching your mouth, nose, or eyes.  What increases the risk?  Risk for infection:    You are more likely to get infected with the COVID-19 virus if:  You are within 6 ft (1.8 m) of a person with COVID-19 for 15 minutes or longer.  You are providing care for a person who is infected with COVID-19.  You are in close personal contact with other people. Close personal contact includes hugging, kissing, or sharing eating or drinking utensils.  Risk for serious illness caused by COVID-19:    You are more likely to get seriously ill from the COVID-19 virus if:  You have cancer.  You have a long-term (chronic) disease, such as:  Chronic lung disease. This includes pulmonary embolism, chronic obstructive pulmonary disease, and cystic fibrosis.  Long-term disease that lowers your body's ability to fight infection (immunocompromise).  Serious cardiac conditions, such as heart failure, coronary artery disease, or cardiomyopathy.  Diabetes.  Chronic kidney disease.  Liver diseases. These include cirrhosis, nonalcoholic fatty liver disease, alcoholic liver disease, or autoimmune hepatitis.  You have obesity.  You are pregnant or were recently pregnant.  You have sickle cell disease.  What are the signs or symptoms?  Symptoms of this condition can range from mild to severe. Symptoms may appear any time from 2 to 14 days after being exposed to the virus. They include:  Fever or chills.  Shortness of breath or trouble breathing.  Feeling tired or very tired.  Headaches, body aches, or muscle aches.  Runny or stuffy nose, sneezing, coughing, or sore throat.  New loss of taste or smell. This is rare.  Some people may also have stomach problems, such as nausea, vomiting, or diarrhea.    Other people may not have any symptoms of COVID-19.    How is this diagnosed?  A sample being collected by swabbing the nose.  This condition may be diagnosed by testing samples to check for the COVID-19 virus. The most common tests are the PCR test and the antigen test. Tests may be done in the lab or at home. They include:  Using a swab to take a sample of fluid from the back of your nose and throat (nasopharyngeal fluid), from your nose, or from your throat.  Testing a sample of saliva from your mouth.  Testing a sample of coughed-up mucus from your lungs (sputum).  How is this treated?  Treatment for COVID-19 infection depends on the severity of the condition.  Mild symptoms can be managed at home with rest, fluids, and over-the-counter medicines.  Serious symptoms may be treated in a hospital intensive care unit (ICU). Treatment in the ICU may include:  Supplemental oxygen. Extra oxygen is given through a tube in the nose, a face mask, or a driscoll.  Medicines. These may include:  Antivirals, such as monoclonal antibodies. These help your body fight off certain viruses that can cause disease.  Anti-inflammatories, such as corticosteroids. These reduce inflammation and suppress the immune system.  Antithrombotics. These prevent or treat blood clots, if they develop.  Convalescent plasma. This helps boost your immune system, if you have an underlying immunosuppressive condition or are getting immunosuppressive treatments.  Prone positioning. This means you will lie on your stomach. This helps oxygen to get into your lungs.  Infection control measures.  If you are at risk for more serious illness caused by COVID-19, your health care provider may prescribe two long-acting monoclonal antibodies, given together every 6 months.    How is this prevented?  To protect yourself:    Use preventive medicine (pre-exposure prophylaxis). You may get pre-exposure prophylaxis if you have moderate or severe immunocompromise.  Get vaccinated. Anyone 6 months old or older who meets guidelines can get a COVID-19 vaccine or vaccine series. This includes people who are pregnant or making breast milk (lactating).  Get an added dose of COVID-19 vaccine after your first vaccine or vaccine series if you have moderate to severe immunocompromise. This applies if you have had a solid organ transplant or have been diagnosed with an immunocompromising condition.  You should get the added dose 4 weeks after you got the first COVID-19 vaccine or vaccine series.  If you get an mRNA vaccine, you will need a 3-dose primary series.  If you get the J&J/Yung vaccine, you will need a 2-dose primary series, with the second dose being an mRNA vaccine.  Talk to your health care provider about getting experimental monoclonal antibodies. This treatment is approved under emergency use authorization to prevent severe illness before or after being exposed to the COVID-19 virus. You may be given monoclonal antibodies if:  You have moderate or severe immunocompromise. This includes treatments that lower your immune response. People with immunocompromise may not develop protection against COVID-19 when they are vaccinated.  You cannot be vaccinated. You may not get a vaccine if you have a severe allergic reaction to the vaccine or its components.  You are not fully vaccinated.  You are in a facility where COVID-19 is present and:  Are in close contact with a person who is infected with the COVID-19 virus.  Are at high risk of being exposed to the COVID-19 virus.  You are at risk of illness from new variants of the COVID-19 virus.  To protect others:    If you have symptoms of COVID-19, take steps to prevent the virus from spreading to others.  Stay home. Leave your house only to get medical care. Do not use public transit, if possible.  Do not travel while you are sick.  Wash your hands often with soap and water for at least 20 seconds. If soap and water are not available, use alcohol-based hand .  Make sure that all people in your household wash their hands well and often.  Cough or sneeze into a tissue or your sleeve or elbow. Do not cough or sneeze into your hand or into the air.  Where to find more information  Centers for Disease Control and Prevention: www.cdc.gov/coronavirus  World Health Organization: www.who.int/health-topics/coronavirus  Get help right away if:  You have trouble breathing.  You have pain or pressure in your chest.  You are confused.  You have bluish lips and fingernails.  You have trouble waking from sleep.  You have symptoms that get worse.  These symptoms may be an emergency. Get help right away. Call 911.  Do not wait to see if the symptoms will go away.  Do not drive yourself to the hospital.  Summary  COVID-19 is an infection that is caused by a new coronavirus.  Sometimes, there are no symptoms. Other times, symptoms range from mild to severe. Some people with a severe COVID-19 infection develop severe disease.  The virus that causes COVID-19 can spread from person to person through droplets or aerosols from breathing, speaking, singing, coughing, or sneezing.  Mild symptoms of COVID-19 can be managed at home with rest, fluids, and over-the-counter medicines.  This information is not intended to replace advice given to you by your health care provider. Make sure you discuss any questions you have with your health care provider.

## 2023-09-10 NOTE — ED PROVIDER NOTE - ABNORMAL RHYTHM
Discharge Instructions - Orthopedics  Sierra Barrios 82 y o  female MRN: [id number]  Unit/Bed#: PACU 09    Weight Bearing Status:                                           Non-weight bearing to the right lower extremity    DVT prophylaxis:  Complete course of Lovenox as directed    Pain:  Continue analgesics as directed    Dressing Instructions:   Keep dressing clean, dry and intact until follow up appointment  Do not shower until 2-3 days after the procedure  PT/OT:  Continue PT/OT on outpatient basis as directed    Appt Instructions: If you do not have your appointment, please call the clinic at [phone] to f/u with Dr Mena Hughes in the office in 2 weeks  Contact the office sooner if you experience any increased numbness/tingling in the extremities 
occasional PVCs

## 2023-09-10 NOTE — ED PROVIDER NOTE - PATIENT PORTAL LINK FT
You can access the FollowMyHealth Patient Portal offered by [facility] by registering at the following website: [email or web address]. By joining [employer]’s FollowMyHealth portal, you will also be able to view your health information using other applications (apps) compatible with our system.

## 2023-09-10 NOTE — ED PROVIDER NOTE - OBJECTIVE STATEMENT
83-year-old male with past medical history of hyperlipidemia and CAD presents today from urgent care due to COVID.  Patient reports that he is experiencing a headache, runny nose, shortness of breath, and chest soreness.  Patient reports that he went to the urgent care today in which his COVID test came back positive.  Patient was prescribed Lagevrio at the urgent care.  Patient was discharged to  the medication but on his way to  the medication he was feeling short of breath to the urgent care advised him to go to the ER.  Patient reports he has no chest pain currently patient had stent placement 2 months ago.  Patient is currently on Plavix and aspirin.  Patient denies chest pain currently, hemoptysis, cough, leg swelling, palpitations, syncope, or any other complaints.
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
## 2023-09-10 NOTE — ED ADULT NURSE NOTE - CHIEF COMPLAINT QUOTE
" I went to [facility] and I tested positive for Covid - prescribed me with Lagevrio 20mg . I have SOB and chest discomfort "

## 2023-09-10 NOTE — ED ADULT NURSE NOTE - OBJECTIVE STATEMENT
84yo male walked into Ed, pt c/o chest discomfort, pt advised staff he tested positive for COVID today. pt denies SOB "I had some chest soreness, the urgent care sent me here"

## 2023-09-10 NOTE — ED PROVIDER NOTE - CARE PROVIDER_API CALL
Teo Navarro  Internal Medicine  [address] [place] [address]  Phone: [phone]  Fax: [phone]  Follow Up Time: 1-3 Days

## 2023-09-19 ENCOUNTER — APPOINTMENT (OUTPATIENT)
Dept: PULMONOLOGY | Facility: CLINIC | Age: 83
End: 2023-09-19
Payer: MEDICARE

## 2023-09-19 VITALS — BODY MASS INDEX: 28.14 KG/M2 | WEIGHT: 190 LBS | HEIGHT: 69 IN

## 2023-09-19 DIAGNOSIS — U07.1 COVID-19: ICD-10-CM

## 2023-09-19 PROCEDURE — 99214 OFFICE O/P EST MOD 30 MIN: CPT | Mod: 95

## 2023-09-26 ENCOUNTER — OUTPATIENT (OUTPATIENT)
Dept: OUTPATIENT SERVICES | Facility: HOSPITAL | Age: 83
LOS: 1 days | End: 2023-09-26
Payer: MEDICARE

## 2023-09-26 ENCOUNTER — APPOINTMENT (OUTPATIENT)
Dept: MRI IMAGING | Facility: CLINIC | Age: 83
End: 2023-09-26
Payer: MEDICARE

## 2023-09-26 DIAGNOSIS — Z98.890 OTHER SPECIFIED POSTPROCEDURAL STATES: Chronic | ICD-10-CM

## 2023-09-26 DIAGNOSIS — Z98.61 CORONARY ANGIOPLASTY STATUS: Chronic | ICD-10-CM

## 2023-09-26 DIAGNOSIS — M54.50 LOW BACK PAIN, UNSPECIFIED: ICD-10-CM

## 2023-09-26 PROCEDURE — 72158 MRI LUMBAR SPINE W/O & W/DYE: CPT | Mod: 26,MH

## 2023-09-26 PROCEDURE — A9585: CPT

## 2023-09-26 PROCEDURE — 72158 MRI LUMBAR SPINE W/O & W/DYE: CPT

## 2023-10-02 ENCOUNTER — LABORATORY RESULT (OUTPATIENT)
Age: 83
End: 2023-10-02

## 2023-10-02 ENCOUNTER — APPOINTMENT (OUTPATIENT)
Dept: CARDIOLOGY | Facility: CLINIC | Age: 83
End: 2023-10-02
Payer: MEDICARE

## 2023-10-02 VITALS
RESPIRATION RATE: 16 BRPM | BODY MASS INDEX: 28.14 KG/M2 | HEIGHT: 69 IN | WEIGHT: 190 LBS | OXYGEN SATURATION: 100 % | HEART RATE: 95 BPM | TEMPERATURE: 97.7 F

## 2023-10-02 VITALS
WEIGHT: 190 LBS | DIASTOLIC BLOOD PRESSURE: 80 MMHG | HEIGHT: 69 IN | SYSTOLIC BLOOD PRESSURE: 132 MMHG | OXYGEN SATURATION: 100 % | TEMPERATURE: 97.7 F | BODY MASS INDEX: 28.14 KG/M2 | HEART RATE: 95 BPM | RESPIRATION RATE: 16 BRPM

## 2023-10-02 PROCEDURE — 99215 OFFICE O/P EST HI 40 MIN: CPT

## 2023-10-13 ENCOUNTER — RX RENEWAL (OUTPATIENT)
Age: 83
End: 2023-10-13

## 2023-10-16 ENCOUNTER — APPOINTMENT (OUTPATIENT)
Dept: NEUROSURGERY | Facility: CLINIC | Age: 83
End: 2023-10-16
Payer: MEDICARE

## 2023-10-16 VITALS
WEIGHT: 190 LBS | BODY MASS INDEX: 28.14 KG/M2 | SYSTOLIC BLOOD PRESSURE: 168 MMHG | OXYGEN SATURATION: 97 % | HEIGHT: 69 IN | HEART RATE: 87 BPM | DIASTOLIC BLOOD PRESSURE: 88 MMHG

## 2023-10-16 DIAGNOSIS — M62.89 OTHER SPECIFIED DISORDERS OF MUSCLE: ICD-10-CM

## 2023-10-16 PROCEDURE — 99214 OFFICE O/P EST MOD 30 MIN: CPT

## 2023-10-25 ENCOUNTER — RX RENEWAL (OUTPATIENT)
Age: 83
End: 2023-10-25

## 2023-11-07 ENCOUNTER — APPOINTMENT (OUTPATIENT)
Dept: PAIN MANAGEMENT | Facility: CLINIC | Age: 83
End: 2023-11-07
Payer: MEDICARE

## 2023-11-07 DIAGNOSIS — M54.16 RADICULOPATHY, LUMBAR REGION: ICD-10-CM

## 2023-11-07 PROCEDURE — 99204 OFFICE O/P NEW MOD 45 MIN: CPT

## 2023-11-28 ENCOUNTER — RESULT REVIEW (OUTPATIENT)
Age: 83
End: 2023-11-28

## 2023-11-28 ENCOUNTER — APPOINTMENT (OUTPATIENT)
Dept: NEUROSURGERY | Facility: CLINIC | Age: 83
End: 2023-11-28
Payer: MEDICARE

## 2023-11-28 VITALS — HEART RATE: 78 BPM | SYSTOLIC BLOOD PRESSURE: 158 MMHG | OXYGEN SATURATION: 98 % | DIASTOLIC BLOOD PRESSURE: 78 MMHG

## 2023-11-28 DIAGNOSIS — M54.50 LOW BACK PAIN, UNSPECIFIED: ICD-10-CM

## 2023-11-28 DIAGNOSIS — M41.50 OTHER SECONDARY SCOLIOSIS, SITE UNSPECIFIED: ICD-10-CM

## 2023-11-28 PROCEDURE — 99214 OFFICE O/P EST MOD 30 MIN: CPT

## 2023-12-12 ENCOUNTER — APPOINTMENT (OUTPATIENT)
Dept: RADIOLOGY | Facility: CLINIC | Age: 83
End: 2023-12-12
Payer: MEDICARE

## 2023-12-12 PROCEDURE — 72083 X-RAY EXAM ENTIRE SPI 4/5 VW: CPT

## 2024-01-10 ENCOUNTER — APPOINTMENT (OUTPATIENT)
Dept: PULMONOLOGY | Facility: CLINIC | Age: 84
End: 2024-01-10
Payer: MEDICARE

## 2024-01-10 VITALS
WEIGHT: 190 LBS | BODY MASS INDEX: 28.14 KG/M2 | RESPIRATION RATE: 16 BRPM | OXYGEN SATURATION: 97 % | HEIGHT: 69 IN | TEMPERATURE: 96.5 F | SYSTOLIC BLOOD PRESSURE: 154 MMHG | DIASTOLIC BLOOD PRESSURE: 82 MMHG | HEART RATE: 78 BPM

## 2024-01-10 DIAGNOSIS — J82.83 EOSINOPHILIC ASTHMA: ICD-10-CM

## 2024-01-10 PROCEDURE — 94729 DIFFUSING CAPACITY: CPT

## 2024-01-10 PROCEDURE — 99214 OFFICE O/P EST MOD 30 MIN: CPT | Mod: 25

## 2024-01-10 PROCEDURE — 95012 NITRIC OXIDE EXP GAS DETER: CPT

## 2024-01-10 PROCEDURE — 94727 GAS DIL/WSHOT DETER LNG VOL: CPT

## 2024-01-10 PROCEDURE — 94010 BREATHING CAPACITY TEST: CPT

## 2024-01-10 PROCEDURE — 94618 PULMONARY STRESS TESTING: CPT

## 2024-01-10 NOTE — HISTORY OF PRESENT ILLNESS
[FreeTextEntry1] : Mr. Hassan is a 83 year old male with a history of abnormal chest CT, allergic rhinitis, asthma, centrilobular emphysema, GERD, KARAN, and SOB presenting to the office today for a follow up visit. His chief complaint is  - he notes starting to feel more SOB over the past couple of months - he notes he is currently dealing with an ear infection - he notes bowels are regular - sense of taste and smell are good - he notes he is on Plavix  - he notes his weight is stable - vision is stable  -he denies any headaches, nausea, emesis, fever, chills, sweats, chest pain, chest pressure, coughing, wheezing, palpitations, constipation, diarrhea, vertigo, dysphagia, heartburn, reflux, itchy eyes, itchy ears, leg swelling, leg pain, arthralgias, myalgias, or sour taste in the mouth.

## 2024-01-10 NOTE — PROCEDURE
[FreeTextEntry1] : Full PFT reveals normal flows; FEV1 was 2.48 L which is 90% of predicted; normal lung volumes; normal diffusion at 14.4, which is 76% of predicted; Poor inspiratory limb. PFTs were performed to evaluate for SOB  6 minute walk test reveals a low saturation of 96% with very slight evidence of dyspnea or fatigue; walked 391.2 meters  FENO was 33; a normal value being less than 25 Fractional exhaled nitric oxide (FENO) is regarded as a simple, noninvasive method for assessing eosinophilic airway inflammation. Produced by a variety of cells within the lung, nitric oxide (NO) concentrations are generally low in healthy individuals. However, high concentrations of NO appear to be involved in nonspecific host defense mechanisms and chronic inflammatory diseases such as asthma. The American Thoracic Society (ATS) therefore has strongly recommended using FENO to aid in the assessment, management, and long-term monitoring of eosinophilic airway inflammation and asthma, and for identifying steroid responsive individuals whose chronic respiratory symptoms may be caused by airway inflammation. In their 2011 clinical practice guideline, the ATS emphasizes the importance of using FENO.

## 2024-01-10 NOTE — ASSESSMENT
[FreeTextEntry1] : Mr. Hassan is 83 year old Male who is stable from a pulmonary perspective, exercising and asymptomatic. He has a history of COVID-19 2/2023, asthma, eosinophilia, COPD, abnormal CT, CAD, OSAS, RLS and neuropathy (feet) - now w/ SOLO (?Cardiac vs Anemia vs Other)   His shortness of breath is multifactorial due to: -poor mechanics of breathing -out of shape -pulmonary disease   -COPD   -Eosinophilic Asthma -cardiac disease    -Iron deficiency anemia    -CAD  Problem 1: asthma / COPD -Quiet off all mediations at this time -Asthma is believed to be caused by inherited (genetic) and environmental factor, but its exact cause is unknown. Asthma may be triggered by allergens, lung infections, or irritants in the air. Asthma triggers are different for each person -Inhaler technique reviewed as well as oral hygiene techniques reviewed with patient. Avoidance of cold air, extremes of temperature, rescue inhaler should be used before exercise. Order of medication reviewed with patient. Recommended use of a cool mist humidifier in the bedroom -COPD is a progressive disease and although it can't be cured , appropriate management can slow its progression, reduce frequency and severity of exacerbations, and improve symptoms and the patient quality of life. Hospitalizations are the greatest contributor to the total COPD costs and account for up to 87% of total COPD related costs. Exacerbations are the main cause of admissions and subsequently account for the 40-75% of COPD costs. Inhaled maintenance therapy reduces the incidence of exacerbations in patients with stable COPD. Incorrect inhaler use and nonadherence are major obstacles to achieving COPD treatment goals. Many COPD patients have challenges (impaired inhalation, limited dexterity, reduced cognition: that limit their ability to correctly use their COPD treatment devices resulting in reduced symptom control. Of most importance is smoking cessation and early intervention with respiratory illnesses and contemplation for pulmonary rehab to enhance quality of life.  Problem 1A: Eosinophilic Asthma -Candidate for Fasenra, Nucala, Dupixent - The safety and efficacy of Nucala was established in three double-blind , randomized, placebo controlled trials in patients with severe asthma. Compared to a placebo, patients with severe asthma receiving Nucala hada fever exacerbation requiring hospitalization and.or emergency department visits, and a longer time to first exacerbation. In addition, patients with severe asthma receiving Nucala or Fasenra experienced greater reductions in their daily maintenance oral corticosteroid dose,m while maintaining asthma control compared with patients receiving placebo. Treatment with Nucala did not result in a significant improvement in lung function as measured by the volume of air exhaled by patients in one second. The most common side effects include: headache, injection site reactions back pain, weakness and fatigue; hypersensitivity reactions can occur within hours or days including swelling of the face, mouth and tongue, fainting, dizziness, hives, breathing problems and rash; herpes zoster infections have occurred. The drug is a monoclonal antibody that inhibits interleukin-5 which helps regular eosinophils, a type of white blood cell that contributes to asthma. The over-prodcution of eosinophils can cause inflammation in the lungs, increasing the frequency of asthma attacks. Patients must also take other medications, including high dose inhaled corticosteroids and at least one additional asthma drug.  Problem 1B: Rash c/w atopic dermatitis -s/p dermatology evaluation -Initiate Dupixent IF NEEDED -recommended Borage and Flax Seed Oil -Recommended to see Dr. Ruby (dermatology)  Problem 1C: Cardiac(?Valve Dz / CAD) -recommended cardiac eval with Dr. Héctor Rea - pending appointment   Problem 2: lung cancer screening (stable) -f/u chest CT next 3/2023 (pending) -Lung Cancer Screening is recommended for people between the ages of 55 and 80 with prior 30+ pack year smoking histories. There is irrefutable evidence for realization of lung cancer screening based on two large randomized control trials demonstrating relative reduction in lung cancer mortality for patients undergoing low-dose CT scanning. Risks and benefits reviewed with the patient.  Problem 2A: COVID-19 9/2023 (resolved) -s/p Paxlovid Rx  Problem 3: GERD -continue Nexium 40 mg QAM  -Rule of 2's- Avoid eating too much, too late, too poorly, too spicy, or two hours before bed -Things to avoid including overeating, spicy foods, tight clothing, eating within three hours of bed, this list is not all inclusive. -For treatment of reflux, possible options discussed including diet control, H2 blockers, PPIs, as well as coating motility agents discussed as treatment options. Timing of meals and proximity of last meal to sleep were discussed. If symptoms persist, a formal gastrointestinal evaluation is needed.  Problem 4: Allergies/ sinus (quiet) -continue nasal saline PRN -recommended OTC antihistamines - get blood work: CBC and vitamin D level Environmental measures for allergies were encouraged including mattress and pillow cover, air purifier, and environmental controls.  Problem 5: OSAS ([place]) -recommended to use "Chin Strap" -intolerable of other treatments - CPAP and dental device -Sleep guard recommended QD at night -continue to use Benadryl PRN -Recommended Oxy-aid -Discussed the risks/associations with coronary artery disease, atrial fibrillation, arrhythmia, memory loss, issues with concentration, stroke risk, hypertension, nocturia, chronic reflux/Interiano's esophagus some but not all inclusive. Treatment options discussed including CPAP/BiPAP machine, oral appliance, ProVent therapy, Oxy-Aid by [employer], new technologies, or positional sleep.  Problem 6: Fatigue -CoQ-10 200 recommended -continue supplemental vitamin D  Problem 7: RLS -off medications at this time Restless Legs Syndrome (RLS), also known as Reyes- Ekbom Disease, is a common sleep -related movement disorder. About 1 in 10 adults in the [place] have problems from restless leg syndrome. It also can be seen in about 2% of children. Women are twice as likely as men to have RLS. People with RLS will have symptoms most often during times when they are less active, especially at bedtime. RLS most often causes an overwhelming urge to move your legs and sometimes other parts of your body. This urge is associated with unpleasant sensations in different parts of the body. The symptoms can be mild to severe and can affect your ability to go to sleep and stay asleep. People with RLS often sleep less at night and feel more tired during the day.  Problem 8: Neuropathy -Continue Neurontin 100 QHS -recommended Neuro Renew -recommended B complex Vitamin -recommended P6 cream -recommended L Glutamine 500 mg BID -recommended to use Alpha- Lipoic Acid 500 mg BID  Problem 9: health maintenance -recommended [employer] stretches for the back -s/p COVID Vaccine Moderna x 4 - not recommending any more boosters at the current time -s/p influenza vaccine 2022 -recommended strep pneumonia vaccines: Prevnar-13 vaccine, followed by Pneumo vaccine 23 on year following (completed) -recommended early intervention for URIs -recommended osteoporosis evaluations -recommended early dermatological evaluations -recommended after the age of 50 to the age of 70, colonoscopy every 5 years -encouraged early intervention   F/u in 6 months - [facility] pt is encouraged to call or fax the office with any questions or concerns.

## 2024-01-10 NOTE — PHYSICAL EXAM
[No Acute Distress] : no acute distress [Normal Oropharynx] : normal oropharynx [III] : Mallampati Class: III [Normal Appearance] : normal appearance [No Neck Mass] : no neck mass [Normal Rate/Rhythm] : normal rate/rhythm [Murmur ___ / 6] : murmur [unfilled] / 6 [Normal S1, S2] : normal s1, s2 [No Resp Distress] : no resp distress [Clear to Auscultation Bilaterally] : clear to auscultation bilaterally [No Abnormalities] : no abnormalities [Benign] : benign [Normal Gait] : normal gait [No Clubbing] : no clubbing [No Cyanosis] : no cyanosis [FROM] : FROM [Normal Color/ Pigmentation] : normal color/ pigmentation [No Focal Deficits] : no focal deficits [Oriented x3] : oriented x3 [Normal Affect] : normal affect [TextBox_54] : 2/6 systolic murmur [TextBox_68] : I:E ratio 1:3; clear [TextBox_105] : 1+ LE Edema

## 2024-01-10 NOTE — ADDENDUM
[FreeTextEntry1] : Documented by Juan A Nicholas acting as a scribe for Dr. Raymundo Rai on 01/10/2024.   All medical record entries made by the Scribe were at my, Dr. Raymundo Rai's, direction and personally dictated by me on 01/10/2024. I have reviewed the chart and agree that the record accurately reflects my personal performance of the history, physical exam, assessment and plan. I have also personally directed, reviewed, and agree with the discharge instructions.

## 2024-01-15 ENCOUNTER — NON-APPOINTMENT (OUTPATIENT)
Age: 84
End: 2024-01-15

## 2024-01-15 ENCOUNTER — APPOINTMENT (OUTPATIENT)
Dept: CARDIOLOGY | Facility: CLINIC | Age: 84
End: 2024-01-15
Payer: MEDICARE

## 2024-01-15 VITALS
HEART RATE: 72 BPM | RESPIRATION RATE: 16 BRPM | HEIGHT: 69 IN | WEIGHT: 190 LBS | DIASTOLIC BLOOD PRESSURE: 83 MMHG | TEMPERATURE: 97.5 F | BODY MASS INDEX: 28.14 KG/M2 | OXYGEN SATURATION: 98 % | SYSTOLIC BLOOD PRESSURE: 129 MMHG

## 2024-01-15 LAB
BASOPHILS # BLD AUTO: 0.07 K/UL
BASOPHILS NFR BLD AUTO: 1 %
EOSINOPHIL # BLD AUTO: 0.59 K/UL
EOSINOPHIL NFR BLD AUTO: 8.6 %
FERRITIN SERPL-MCNC: 29 NG/ML
HCT VFR BLD CALC: 41 %
HGB BLD-MCNC: 12.9 G/DL
IMM GRANULOCYTES NFR BLD AUTO: 0.3 %
IRON SATN MFR SERPL: 34 %
IRON SERPL-MCNC: 118 UG/DL
LYMPHOCYTES # BLD AUTO: 1.4 K/UL
LYMPHOCYTES NFR BLD AUTO: 20.4 %
MAN DIFF?: NORMAL
MCHC RBC-ENTMCNC: 30.4 PG
MCHC RBC-ENTMCNC: 31.5 GM/DL
MCV RBC AUTO: 96.7 FL
MONOCYTES # BLD AUTO: 0.61 K/UL
MONOCYTES NFR BLD AUTO: 8.9 %
NEUTROPHILS # BLD AUTO: 4.18 K/UL
NEUTROPHILS NFR BLD AUTO: 60.8 %
PLATELET # BLD AUTO: 278 K/UL
RBC # BLD: 4.24 M/UL
RBC # FLD: 13.1 %
TIBC SERPL-MCNC: 347 UG/DL
UIBC SERPL-MCNC: 228 UG/DL
WBC # FLD AUTO: 6.87 K/UL

## 2024-01-15 PROCEDURE — 99214 OFFICE O/P EST MOD 30 MIN: CPT

## 2024-01-15 NOTE — PHYSICAL EXAM
[Well Developed] : well developed [Well Nourished] : well nourished [No Acute Distress] : no acute distress [Normal Venous Pressure] : normal venous pressure [No Carotid Bruit] : no carotid bruit [Carotid Bruit] : carotid bruit [Normal S1, S2] : normal S1, S2 [No Rub] : no rub [Clear Lung Fields] : clear lung fields [Good Air Entry] : good air entry [No Respiratory Distress] : no respiratory distress  [Soft] : abdomen soft [Non Tender] : non-tender [No Masses/organomegaly] : no masses/organomegaly [Normal Bowel Sounds] : normal bowel sounds [Normal Gait] : normal gait [No Edema] : no edema [No Cyanosis] : no cyanosis [No Clubbing] : no clubbing [No Varicosities] : no varicosities [No Rash] : no rash [No Skin Lesions] : no skin lesions [Moves all extremities] : moves all extremities [No Focal Deficits] : no focal deficits [Normal Speech] : normal speech [Alert and Oriented] : alert and oriented [Normal memory] : normal memory [General Appearance - Well Developed] : well developed [Normal Appearance] : normal appearance [Well Groomed] : well groomed [General Appearance - Well Nourished] : well nourished [No Deformities] : no deformities [General Appearance - In No Acute Distress] : no acute distress [Normal Conjunctiva] : the conjunctiva exhibited no abnormalities [Normal Oral Mucosa] : normal oral mucosa [No Oral Pallor] : no oral pallor [Normal Oropharynx] : normal oropharynx [Normal Jugular Venous A Waves Present] : normal jugular venous A waves present [Normal Jugular Venous V Waves Present] : normal jugular venous V waves present [No Jugular Venous Jewell A Waves] : no jugular venous jewell A waves [Exaggerated Use Of Accessory Muscles For Inspiration] : no accessory muscle use [Respiration, Rhythm And Depth] : normal respiratory rhythm and effort [Auscultation Breath Sounds / Voice Sounds] : lungs were clear to auscultation bilaterally [Bowel Sounds] : normal bowel sounds [Abdomen Soft] : soft [Abnormal Walk] : normal gait [Abdomen Tenderness] : non-tender [Cyanosis, Localized] : no localized cyanosis [Nail Clubbing] : no clubbing of the fingernails [Petechial Hemorrhages (___cm)] : no petechial hemorrhages [Skin Color & Pigmentation] : normal skin color and pigmentation [Skin Turgor] : normal skin turgor [] : no rash [No Venous Stasis] : no venous stasis [Oriented To Time, Place, And Person] : oriented to person, place, and time [Impaired Insight] : insight and judgment were intact [Affect] : the affect was normal [No Anxiety] : not feeling anxious [5th Left ICS - MCL] : palpated at the 5th LICS in the midclavicular line [Normal] : normal [No Precordial Heave] : no precordial heave was noted [Normal Rate] : normal [Rhythm Regular] : regular [Normal S1] : normal S1 [Normal S2] : normal S2 [No Gallop] : no gallop heard [No Murmur] : no murmurs heard [II] : a grade 2 [I] : a grade 1 [2+] : left 2+ [No Abnormalities] : the abdominal aorta was not enlarged and no bruit was heard [No Pitting Edema] : no pitting edema present [Rt] : varicose veins of the right leg noted [Lt] : varicose veins of the left leg noted [de-identified] : left side possible bruit or transmitted murmur. [Apical Thrill] : no thrill palpable at the apex [S3] : no S3 [S4] : no S4 [Click] : no click [Pericardial Rub] : no pericardial rub [Right Carotid Bruit] : no bruit heard over the right carotid [Left Carotid Bruit] : no bruit heard over the left carotid [Right Femoral Bruit] : no bruit heard over the right femoral artery [Left Femoral Bruit] : no bruit heard over the left femoral artery

## 2024-01-15 NOTE — ASSESSMENT
[FreeTextEntry1] : Prior note nurse practitioner Enrique May 26, 2023::  This is a 83-year-old male with a past medical history significant for coronary artery disease, s/p stent to right coronary artery, hypertension, hyperlipidemia, statin intolerance, mitral valve regurgitation, comes in for followup cardiac evaluation. He is s/p b/l venous ablation on January 7, 2020 with Dr. Brooks.    He is feeling generally well today and denies chest pain, dizziness, heart palpitations, recent episodes of syncope or falls, SOB, or dyspnea at this time.    CHIEF COMPLAINT:  Today he is feeling generally well and does not have any complaints at this time. He is here to go over the results of his nuclear stress test and for regular follow-up appointment.    He is currently prescribed Zetia 10mg PO Daily and Repatha 140 mg/inj tolerating well.    Patient states he is not on any antiplatelet therapy at this time. He is not on aspirin therapy despite having history of coronary artery stents. He is refusing to be on aspirin since he has a history for hematuria. He will ask his urologist if he may be a candidate for Plavix.    He denies fever, chills, weight loss, malaise, rash, alteration bowel habits, weakness, abdominal pain, bloating, changes in urination, visual disturbances, chest pain, headaches, dizziness, heart palpitations, recent episodes of syncope or falls at this time.    BLOOD PRESSURE:  -BP is borderline elevated on today's exam. He states his readings are normally within normal ranges and that he is never had high blood pressure.    BLOOD WORK:  -New blood work was done 04/25/2023 to evaluate lipid profile, CBC, BMP, hepatic function, A1C and TSH    TESTING/REPORTS:  -The patient had an abnormal nuclear stress test which was done on May 16th 2023 which demonstrated small, mild defects in distal inferior and inferior apical walls that are reversible consistent with ischemia. There are small, moderate defects in the basal inferior, basal inferior lateral walls that were fixed consistent with infarct.  *At this time during our visit the patient is not symptomatic and does not have any chest pain or shortness of breath. He states that he played golf all day yesterday and was feeling well otherwise. He does have a history of 2 coronary artery stents however is not on antiplatelet therapy.  -EKG done Apr 25, 2023 which demonstrated regular sinus rhythm with nonspecific ST-T wave changes, BPM of 63.    -Electrocardiogram done March 13, 2023 demonstrated normal sinus rhythm rate 65 bpm is otherwise remarkable for poor baseline with artifact.    -Electrocardiogram done October 7, 2022 demonstrated normal sinus rhythm rate of 61 bpm is otherwise unremarkable.    -Echo Doppler examination done July 17, 2022 demonstrated mild posterior mitral valve leaflet prolapse with moderate mitral valve regurgitation, minimal aortic valve regurgitation, mild tricuspid valve regurgitation, mild pulmonic valve regurgitation.    -Electrocardiogram done November 3, 2021 demonstrate normal sinus rhythm rate 66 bpm is otherwise within normal limits.    -Electrocardiogram done May 18, 2021 demonstrate normal sinus rhythm rate 62 bpm and is otherwise unremarkable.    PMH:  He had a cardiac catheterization done November 10, 2011 which revealed a 40% lesion in the left anterior descending artery, luminal irregularities in the distal left main artery and circumflex artery, 20% tubular stenosis at the site of the prior stent. Medical therapy was recommended at that time.  He had a bare-metal stent placed in the proximal mid right coronary artery in February 2002.    PLAN:  -The patient will proceed with a CCTA to further evaluate his coronary artery anatomy. He would like to have this done to make sure that he does not have any risk for potential MI/blockage.  -He will continue with his usual medications and will contact the office if he is having any complaints between now and their next follow up appointment.  -He was told that we will reevaluate his blood pressure in the future and that he may need antihypertensive medication if blood pressure does not improve.    I have discussed the plan of care with Mr. ROSCOE CRUZ and he will follow up in 3 months. He is compliant with all of his medications.  The patient understands that aerobic exercises must be increased to at least 20 minutes 4 times/week along with maintaining lifestyle modifications including well-maintained diet. He will contact me at the office for any questions with their care or any changes in their health status.    Adelita ORR .

## 2024-01-15 NOTE — DISCUSSION/SUMMARY
[FreeTextEntry1] : This is a 83-year-old male with past medical history significant for coronary artery disease, status post stent to the left anterior descending artery July 18, 2023 (with a mid lesion of 99%), status post stent to right coronary artery, hypertension, hyperlipidemia, statin intolerance, mitral valve regurgitation, who comes in for followup cardiac evaluation. He denies chest pain, palpitations, dizziness or syncope.  The patient was complaining of occasional dyspnea on exertion only with stairs.  He is not having that anymore. He is complaining of ecchymosis related to dual antiplatelet therapy. At this point in time he will complete 6 months of dual antiplatelet therapy, discontinue aspirin at the end of January 2024 and continue on Plavix as his antiplatelet agent and follow-up with me in March 2024. He remains on Repatha 140 mg subcutaneously every 2 weeks. Blood work done October 2, 2023 demonstrated a cholesterol of 177, HDL 68, triglycerides 140, LDL direct 90 mg/dL the patient was supposed to restart on Zetia therapy. He did not do that.  He reports having blood work done with his pulmonologist last week. If his LDL is not at target of less than 70 mg/dL.  The patient will require the addition of Zetia 10 mg daily. He is also having trouble with his hearing aids and will follow-up with his ENT and hearing aid specialist.  He discontinued his Zetia because he felt it was causing him muscle aches.  I explained to him in detail that the medication is a gastrointestinal mechanism which would not be contributing to muscle aches.  He will take this under consideration. The patient had an abnormal nuclear stress test and coronary CTA. Cardiac catheterization done July 18, 2023 demonstrated a 99% lesion to the mid left anterior descending artery, minor irregularities in the left circumflex artery and first obtuse marginal artery, right coronary artery and right posterior descending artery. He received a drug-eluting stent to his LAD lesion.  He was discharged on Plavix and aspirin The patient reports hematuria and clots when self catheterizing twice a day. I have explained to him that he needs to be on dual antiplatelet therapy especially acutely in the first 48 weeks after stent. I have asked him to contact his urologist to see if there is something that can be done and find a source of the bleeding. Electrocardiogram done July 24, 2023 demonstrated normal sinus rhythm rate 70 bpm is otherwise remarkable for 2 premature ventricular contractions. The patient is also complaining of some muscle aches on Zetia.  I have told him this is unlikely etiology of his muscle aches.  In the meantime he will discontinue Zetia for now.  He will continue on Repatha therapy. Lipid panel done April 25, 2023 demonstrated cholesterol 134, HDL 58, triglycerides 164, LDL direct 49 mg/dL, non-HDL cholesterol 76 mg/dL.  Hemoglobin A1c of 6.0. Electrocardiogram done March 13, 2023 demonstrated normal sinus rhythm rate 65 bpm is otherwise remarkable for poor baseline with artifact. Electrocardiogram done October 7, 2022 demonstrated normal sinus rhythm rate of 61 bpm is otherwise unremarkable. Echo Doppler examination done July 17, 2022 demonstrated mild posterior mitral valve leaflet prolapse with moderate mitral valve regurgitation, minimal aortic valve regurgitation, mild tricuspid valve regurgitation, mild pulmonic valve regurgitation. Electrocardiogram done November 3, 2021 demonstrate normal sinus rhythm rate 66 bpm is otherwise within normal limits.  Electrocardiogram done May 18, 2021 demonstrate normal sinus rhythm rate 62 bpm and is otherwise unremarkable.  I have asked him to follow-up in 6 weeks to recheck his blood pressure.  He reports his blood pressure is within normal limits and on the doctor's offices.  Lipid panel done October 8, 2022 demonstrated a total cholesterol 164, HDL 58, LDL direct of 87 mg/dL non-HDL cholesterol 106 mg/dL with triglycerides 105 mg/dL.  This will also be repeated at the time of next visit when he really starts Repatha therapy. He will continue on his current dose of Zetia 10 mg daily. Blood work done November 3, 2021 demonstrated direct LDL of 77 mg/dL with total cholesterol 162 mg/dL.  The patient's target LDL is less than 70 mg/dL.  I have recommended he add Zetia 10 mg daily.  I do not believe his previous symptoms on Zetia related to the Zetia treatment. He will have blood work done and 8 to 10 weeks. Electrocardiogram done November 3, 2021 demonstrate normal sinus rhythm rate 66 bpm is otherwise within normal limits. Electrocardiogram done May 18, 2021 demonstrate normal sinus rhythm rate 62 bpm and is otherwise unremarkable. PMH: The patient reports that the Zetia was making him feel groggy.  I have explained to him that I feel his gabapentin 3 times a day is more likely causing that side effect. His LDL cholesterol needs to be less than 70 mg/dL. The patient also complains of bilateral cramping in his calf muscles when walking.  He is no longer on Crestor 5 mg/day.  However given his risk profile, the patient will have an ankle-brachial index done today. .He had recent venous ablation done January 7, 2020 by vascular surgery.  This was complicated by localized infection.  He will followup with his vascular surgeon. Lifestyle modification was reinforced. PMH: He had a cardiac catheterization done November 10, 2011 which revealed a 40% lesion in the left anterior descending artery, luminal irregularities in the distal left main artery and circumflex artery, 20% tubular stenosis at the site of the prior stent. Medical therapy was recommended at that time. He had a bare-metal stent placed in the proximal mid right coronary artery in February 2002.  The patient understands that aerobic exercises must be increased to 40 minutes 4 times per week. A detailed discussion of lifestyle modification was done today. The patient has a good understanding of the diagnosis, and treatment plan. Lifestyle modification was also outlined.

## 2024-01-15 NOTE — REASON FOR VISIT
[CV Risk Factors and Non-Cardiac Disease] : CV risk factors and non-cardiac disease [Follow-Up - Clinic] : a clinic follow-up of [Coronary Artery Disease] : coronary artery disease [Hyperlipidemia] : hyperlipidemia [Hypertension] : hypertension [Mitral Regurgitation] : mitral regurgitation [Palpitations] : palpitations [FreeTextEntry3] : Teo Navarro [FreeTextEntry1] : s/p stent CAD, s/p b/l venous ablation

## 2024-02-20 ENCOUNTER — APPOINTMENT (OUTPATIENT)
Dept: NEUROSURGERY | Facility: CLINIC | Age: 84
End: 2024-02-20

## 2024-02-20 ENCOUNTER — APPOINTMENT (OUTPATIENT)
Dept: CARDIOLOGY | Facility: CLINIC | Age: 84
End: 2024-02-20

## 2024-02-29 ENCOUNTER — NON-APPOINTMENT (OUTPATIENT)
Age: 84
End: 2024-02-29

## 2024-03-06 ENCOUNTER — RX RENEWAL (OUTPATIENT)
Age: 84
End: 2024-03-06

## 2024-03-24 ENCOUNTER — LABORATORY RESULT (OUTPATIENT)
Age: 84
End: 2024-03-24

## 2024-03-25 ENCOUNTER — APPOINTMENT (OUTPATIENT)
Dept: CARDIOLOGY | Facility: CLINIC | Age: 84
End: 2024-03-25
Payer: MEDICARE

## 2024-03-25 VITALS
TEMPERATURE: 98.4 F | WEIGHT: 190 LBS | HEART RATE: 85 BPM | OXYGEN SATURATION: 98 % | DIASTOLIC BLOOD PRESSURE: 80 MMHG | RESPIRATION RATE: 16 BRPM | SYSTOLIC BLOOD PRESSURE: 122 MMHG | HEIGHT: 69 IN | BODY MASS INDEX: 28.14 KG/M2

## 2024-03-25 DIAGNOSIS — I10 ESSENTIAL (PRIMARY) HYPERTENSION: ICD-10-CM

## 2024-03-25 DIAGNOSIS — E78.5 HYPERLIPIDEMIA, UNSPECIFIED: ICD-10-CM

## 2024-03-25 DIAGNOSIS — I25.10 ATHEROSCLEROTIC HEART DISEASE OF NATIVE CORONARY ARTERY W/OUT ANGINA PECTORIS: ICD-10-CM

## 2024-03-25 DIAGNOSIS — Z78.9 OTHER SPECIFIED HEALTH STATUS: ICD-10-CM

## 2024-03-25 DIAGNOSIS — Z95.5 PRESENCE OF CORONARY ANGIOPLASTY IMPLANT AND GRAFT: ICD-10-CM

## 2024-03-25 DIAGNOSIS — I34.0 NONRHEUMATIC MITRAL (VALVE) INSUFFICIENCY: ICD-10-CM

## 2024-03-25 PROCEDURE — G2211 COMPLEX E/M VISIT ADD ON: CPT

## 2024-03-25 PROCEDURE — 99214 OFFICE O/P EST MOD 30 MIN: CPT

## 2024-03-25 RX ORDER — CYCLOBENZAPRINE HYDROCHLORIDE 5 MG/1
5 TABLET, FILM COATED ORAL
Qty: 30 | Refills: 1 | Status: COMPLETED | COMMUNITY
Start: 2023-08-04 | End: 2024-03-25

## 2024-03-25 NOTE — DISCUSSION/SUMMARY
[FreeTextEntry1] : Dr. Rea-(PRIOR VISIT and PMH WITH Dr. Rea): This is a 83-year-old male with past medical history significant for coronary artery disease, status post stent to the left anterior descending artery July 18, 2023 (with a mid lesion of 99%), status post stent to right coronary artery, hypertension, hyperlipidemia, statin intolerance, mitral valve regurgitation, who comes in for followup cardiac evaluation. He denies chest pain, palpitations, dizziness or syncope.  The patient was complaining of occasional dyspnea on exertion only with stairs.  He is not having that anymore. He is complaining of ecchymosis related to dual antiplatelet therapy. At this point in time he will complete 6 months of dual antiplatelet therapy, discontinue aspirin at the end of January 2024 and continue on Plavix as his antiplatelet agent and follow-up with me in March 2024. He remains on Repatha 140 mg subcutaneously every 2 weeks. Blood work done October 2, 2023 demonstrated a cholesterol of 177, HDL 68, triglycerides 140, LDL direct 90 mg/dL the patient was supposed to restart on Zetia therapy. He did not do that.  He reports having blood work done with his pulmonologist last week. If his LDL is not at target of less than 70 mg/dL.  The patient will require the addition of Zetia 10 mg daily. He is also having trouble with his hearing aids and will follow-up with his ENT and hearing aid specialist.  He discontinued his Zetia because he felt it was causing him muscle aches.  I explained to him in detail that the medication is a gastrointestinal mechanism which would not be contributing to muscle aches.  He will take this under consideration. The patient had an abnormal nuclear stress test and coronary CTA. Cardiac catheterization done July 18, 2023 demonstrated a 99% lesion to the mid left anterior descending artery, minor irregularities in the left circumflex artery and first obtuse marginal artery, right coronary artery and right posterior descending artery. He received a drug-eluting stent to his LAD lesion.  He was discharged on Plavix and aspirin The patient reports hematuria and clots when self catheterizing twice a day. I have explained to him that he needs to be on dual antiplatelet therapy especially acutely in the first 48 weeks after stent. I have asked him to contact his urologist to see if there is something that can be done and find a source of the bleeding. Electrocardiogram done July 24, 2023 demonstrated normal sinus rhythm rate 70 bpm is otherwise remarkable for 2 premature ventricular contractions. The patient is also complaining of some muscle aches on Zetia.  I have told him this is unlikely etiology of his muscle aches.  In the meantime he will discontinue Zetia for now.  He will continue on Repatha therapy. Lipid panel done April 25, 2023 demonstrated cholesterol 134, HDL 58, triglycerides 164, LDL direct 49 mg/dL, non-HDL cholesterol 76 mg/dL.  Hemoglobin A1c of 6.0. Electrocardiogram done March 13, 2023 demonstrated normal sinus rhythm rate 65 bpm is otherwise remarkable for poor baseline with artifact. Electrocardiogram done October 7, 2022 demonstrated normal sinus rhythm rate of 61 bpm is otherwise unremarkable. Echo Doppler examination done July 17, 2022 demonstrated mild posterior mitral valve leaflet prolapse with moderate mitral valve regurgitation, minimal aortic valve regurgitation, mild tricuspid valve regurgitation, mild pulmonic valve regurgitation. Electrocardiogram done November 3, 2021 demonstrate normal sinus rhythm rate 66 bpm is otherwise within normal limits.  Electrocardiogram done May 18, 2021 demonstrate normal sinus rhythm rate 62 bpm and is otherwise unremarkable.  I have asked him to follow-up in 6 weeks to recheck his blood pressure.  He reports his blood pressure is within normal limits and on the doctor's offices.  Lipid panel done October 8, 2022 demonstrated a total cholesterol 164, HDL 58, LDL direct of 87 mg/dL non-HDL cholesterol 106 mg/dL with triglycerides 105 mg/dL.  This will also be repeated at the time of next visit when he really starts Repatha therapy. He will continue on his current dose of Zetia 10 mg daily. Blood work done November 3, 2021 demonstrated direct LDL of 77 mg/dL with total cholesterol 162 mg/dL.  The patient's target LDL is less than 70 mg/dL.  I have recommended he add Zetia 10 mg daily.  I do not believe his previous symptoms on Zetia related to the Zetia treatment. He will have blood work done and 8 to 10 weeks. Electrocardiogram done November 3, 2021 demonstrate normal sinus rhythm rate 66 bpm is otherwise within normal limits. Electrocardiogram done May 18, 2021 demonstrate normal sinus rhythm rate 62 bpm and is otherwise unremarkable. PMH: The patient reports that the Zetia was making him feel groggy.  I have explained to him that I feel his gabapentin 3 times a day is more likely causing that side effect. His LDL cholesterol needs to be less than 70 mg/dL. The patient also complains of bilateral cramping in his calf muscles when walking.  He is no longer on Crestor 5 mg/day.  However given his risk profile, the patient will have an ankle-brachial index done today. .He had recent venous ablation done January 7, 2020 by vascular surgery.  This was complicated by localized infection.  He will followup with his vascular surgeon. Lifestyle modification was reinforced. PMH: He had a cardiac catheterization done November 10, 2011 which revealed a 40% lesion in the left anterior descending artery, luminal irregularities in the distal left main artery and circumflex artery, 20% tubular stenosis at the site of the prior stent. Medical therapy was recommended at that time. He had a bare-metal stent placed in the proximal mid right coronary artery in February 2002.  The patient understands that aerobic exercises must be increased to 40 minutes 4 times per week. A detailed discussion of lifestyle modification was done today. The patient has a good understanding of the diagnosis, and treatment plan. Lifestyle modification was also outlined.

## 2024-03-25 NOTE — ASSESSMENT
[FreeTextEntry1] : This is an 83-year-old male with a past medical history significant for coronary artery disease, s/p stent to right coronary artery, hypertension, hyperlipidemia, statin intolerance, mitral valve regurgitation, comes in for follow-up cardiac evaluation. He is s/p b/l venous ablation on January 7, 2020 with Dr. Brooks.  HPI: He is feeling generally well today and denies chest pain, dizziness, heart palpitations, recent episodes of syncope or falls, SOB, or dyspnea at this time.  Current Medications: Clopidogrel 75 mg daily and Repatha 140 mg injection biweekly.  He has been tolerating his Repatha well but states it is becoming too expensive monthly for him out-of-pocket. He has a history of statin intolerance as well as reaction to Zetia 10 mg daily which prompted him to begin PCSK9 inhibitor therapy. I have explained he is a good candidate for Leqvio therapy, and this would include in office injections at start, 2nd injection 90-days after, and additional injections every 6 months continuing forwards. He is agreeable to initiating this therapy if possible.   PMH: The patient reports that the Zetia was making him feel groggy.  I have explained to him that I feel his gabapentin 3 times a day is more likely causing that side effect. His LDL cholesterol needs to be less than 70 mg/dL. The patient also complains of bilateral cramping in his calf muscles when walking.  He is no longer on Crestor 5 mg/day.   BLOOD PRESSURE: Better controlled.    BLOOD WORK:  *LDL target goal < 70* -New blood work was done 03/25/2024 to evaluate lipid profile, CBC, BMP, hepatic function, A1C and TSH. -Blood work done October 2, 2023 demonstrated a cholesterol of 177, HDL 68, triglycerides 140, LDL direct 90 mg/dL the patient was supposed to restart on Zetia therapy. -Lipid panel done April 25, 2023 demonstrated cholesterol 134, HDL 58, triglycerides 164, LDL direct 49 mg/dL, non-HDL cholesterol 76 mg/dL.  Hemoglobin A1c of 6.0.   TESTING/REPORTS: -EKG done 01/15/2024 demonstrated regular sinus rhythm rate 72 BPM otherwise unremarkable.   -Electrocardiogram done July 24, 2023 demonstrated normal sinus rhythm rate 70 bpm is otherwise remarkable for 2 premature ventricular contractions.  -Cardiac catheterization done July 18, 2023 demonstrated a 99% lesion to the mid left anterior descending artery, minor irregularities in the left circumflex artery and first obtuse marginal artery, right coronary artery and right posterior descending artery.  -The patient had an abnormal nuclear stress test which was done on May 16th 2023 which demonstrated small, mild defects in distal inferior and inferior apical walls that are reversible consistent with ischemia. There are small, moderate defects in the basal inferior, basal inferior lateral walls that were fixed consistent with infarct.  *At this time during our visit the patient is not symptomatic and does not have any chest pain or shortness of breath. He states that he played golf all day yesterday and was feeling well otherwise. He does have a history of 2 coronary artery stents however is not on antiplatelet therapy.  -EKG done Apr 25, 2023 which demonstrated regular sinus rhythm with nonspecific ST-T wave changes, BPM of 63.  -Electrocardiogram done March 13, 2023 demonstrated normal sinus rhythm rate 65 bpm is otherwise remarkable for poor baseline with artifact.  -Electrocardiogram done October 7, 2022 demonstrated normal sinus rhythm rate of 61 bpm is otherwise unremarkable.  -Echo Doppler examination done July 17, 2022 demonstrated mild posterior mitral valve leaflet prolapse with moderate mitral valve regurgitation, minimal aortic valve regurgitation, mild tricuspid valve regurgitation, mild pulmonic valve regurgitation.  -Electrocardiogram done November 3, 2021 demonstrate normal sinus rhythm rate 66 bpm is otherwise within normal limits.  -Electrocardiogram done May 18, 2021 demonstrate normal sinus rhythm rate 62 bpm and is otherwise unremarkable.  -PMH: He had a cardiac catheterization done November 10, 2011 which revealed a 40% lesion in the left anterior descending artery, luminal irregularities in the distal left main artery and circumflex artery, 20% tubular stenosis at the site of the prior stent. Medical therapy was recommended at that time.  -He had a bare-metal stent placed in the proximal mid right coronary artery in February 2002.   PLAN: -Leqvio start forms signed at office visit today to initiate prior authorization. I have given him the information sheets about the [facility] and explained that his injections would be ordered by our office and given in person at future appointments.  -He will continue with his usual medications and will contact the office if he is having any complaints between now and their next follow up appointment.   I have discussed the plan of care with ROSCOE CRUZ and he will follow up in 3 months. They are compliant with all of their medications.     The patient understands that aerobic exercises must be increased to 40 minutes 4 times/week and a detailed discussion of lifestyle modification was done today.   The patient has a good understanding of the diagnosis, treatment plan and lifestyle modification.   They will contact me at the office for any questions with their care or any changes in their health status.   The patient was discussed with supervision physician Dr. Héctor Rea at the time of the visit and the plan of care will be carried out as noted above.     EARLENE Mas NP
no

## 2024-03-25 NOTE — PHYSICAL EXAM
[Well Developed] : well developed [Well Nourished] : well nourished [No Acute Distress] : no acute distress [Carotid Bruit] : carotid bruit [Normal Venous Pressure] : normal venous pressure [No Carotid Bruit] : no carotid bruit [No Rub] : no rub [Normal S1, S2] : normal S1, S2 [Clear Lung Fields] : clear lung fields [Good Air Entry] : good air entry [Soft] : abdomen soft [No Respiratory Distress] : no respiratory distress  [Non Tender] : non-tender [No Masses/organomegaly] : no masses/organomegaly [Normal Gait] : normal gait [Normal Bowel Sounds] : normal bowel sounds [No Edema] : no edema [No Cyanosis] : no cyanosis [No Clubbing] : no clubbing [No Varicosities] : no varicosities [No Rash] : no rash [No Skin Lesions] : no skin lesions [Moves all extremities] : moves all extremities [No Focal Deficits] : no focal deficits [Normal Speech] : normal speech [Alert and Oriented] : alert and oriented [Normal memory] : normal memory [de-identified] : left side possible bruit or transmitted murmur. [Normal Appearance] : normal appearance [General Appearance - Well Developed] : well developed [Well Groomed] : well groomed [General Appearance - Well Nourished] : well nourished [General Appearance - In No Acute Distress] : no acute distress [No Deformities] : no deformities [Normal Conjunctiva] : the conjunctiva exhibited no abnormalities [No Oral Pallor] : no oral pallor [Normal Oral Mucosa] : normal oral mucosa [Normal Jugular Venous V Waves Present] : normal jugular venous V waves present [Normal Jugular Venous A Waves Present] : normal jugular venous A waves present [Normal Oropharynx] : normal oropharynx [No Jugular Venous Jewell A Waves] : no jugular venous jewell A waves [Exaggerated Use Of Accessory Muscles For Inspiration] : no accessory muscle use [Respiration, Rhythm And Depth] : normal respiratory rhythm and effort [Abdomen Soft] : soft [Auscultation Breath Sounds / Voice Sounds] : lungs were clear to auscultation bilaterally [Bowel Sounds] : normal bowel sounds [Abdomen Tenderness] : non-tender [Abnormal Walk] : normal gait [Cyanosis, Localized] : no localized cyanosis [Nail Clubbing] : no clubbing of the fingernails [Petechial Hemorrhages (___cm)] : no petechial hemorrhages [Skin Turgor] : normal skin turgor [Skin Color & Pigmentation] : normal skin color and pigmentation [] : no rash [No Venous Stasis] : no venous stasis [Oriented To Time, Place, And Person] : oriented to person, place, and time [Impaired Insight] : insight and judgment were intact [No Anxiety] : not feeling anxious [Affect] : the affect was normal [5th Left ICS - MCL] : palpated at the 5th LICS in the midclavicular line [Normal] : normal [No Precordial Heave] : no precordial heave was noted [Normal Rate] : normal [Apical Thrill] : no thrill palpable at the apex [Rhythm Regular] : regular [Normal S1] : normal S1 [Normal S2] : normal S2 [S3] : no S3 [No Gallop] : no gallop heard [S4] : no S4 [Click] : no click [Pericardial Rub] : no pericardial rub [No Murmur] : no murmurs heard [I] : a grade 1 [II] : a grade 2 [Right Carotid Bruit] : no bruit heard over the right carotid [Left Carotid Bruit] : no bruit heard over the left carotid [Right Femoral Bruit] : no bruit heard over the right femoral artery [2+] : left 2+ [Left Femoral Bruit] : no bruit heard over the left femoral artery [No Pitting Edema] : no pitting edema present [No Abnormalities] : the abdominal aorta was not enlarged and no bruit was heard [Rt] : varicose veins of the right leg noted [Lt] : varicose veins of the left leg noted

## 2024-03-25 NOTE — REASON FOR VISIT
[CV Risk Factors and Non-Cardiac Disease] : CV risk factors and non-cardiac disease [FreeTextEntry3] : Teo Navarro [Follow-Up - Clinic] : a clinic follow-up of [Coronary Artery Disease] : coronary artery disease [Hyperlipidemia] : hyperlipidemia [Mitral Regurgitation] : mitral regurgitation [Hypertension] : hypertension [Palpitations] : palpitations [FreeTextEntry1] : s/p stent CAD, s/p b/l venous ablation

## 2024-04-01 ENCOUNTER — NON-APPOINTMENT (OUTPATIENT)
Age: 84
End: 2024-04-01

## 2024-04-19 ENCOUNTER — APPOINTMENT (OUTPATIENT)
Dept: CARDIOLOGY | Facility: CLINIC | Age: 84
End: 2024-04-19
Payer: MEDICARE

## 2024-04-19 PROCEDURE — 96372 THER/PROPH/DIAG INJ SC/IM: CPT

## 2024-04-19 RX ORDER — EVOLOCUMAB 140 MG/ML
140 INJECTION, SOLUTION SUBCUTANEOUS
Qty: 6 | Refills: 1 | Status: DISCONTINUED | COMMUNITY
Start: 2021-11-03 | End: 2024-04-19

## 2024-04-19 RX ORDER — INCLISIRAN 284 MG/1.5ML
284 INJECTION, SOLUTION SUBCUTANEOUS
Qty: 0 | Refills: 0 | Status: COMPLETED | OUTPATIENT
Start: 2024-04-19

## 2024-04-19 RX ADMIN — INCLISIRAN 284 MG/1.5ML: 284 INJECTION, SOLUTION SUBCUTANEOUS at 00:00

## 2024-05-14 RX ORDER — INCLISIRAN 284 MG/1.5ML
284 INJECTION, SOLUTION SUBCUTANEOUS
Refills: 0 | Status: ACTIVE | COMMUNITY
Start: 2024-05-14

## 2024-05-30 ENCOUNTER — APPOINTMENT (OUTPATIENT)
Dept: PULMONOLOGY | Facility: CLINIC | Age: 84
End: 2024-05-30
Payer: MEDICARE

## 2024-05-30 VITALS
TEMPERATURE: 97.4 F | BODY MASS INDEX: 28.14 KG/M2 | OXYGEN SATURATION: 97 % | DIASTOLIC BLOOD PRESSURE: 70 MMHG | SYSTOLIC BLOOD PRESSURE: 138 MMHG | HEART RATE: 71 BPM | RESPIRATION RATE: 16 BRPM | WEIGHT: 190 LBS | HEIGHT: 69 IN

## 2024-05-30 DIAGNOSIS — J30.9 ALLERGIC RHINITIS, UNSPECIFIED: ICD-10-CM

## 2024-05-30 DIAGNOSIS — J45.909 UNSPECIFIED ASTHMA, UNCOMPLICATED: ICD-10-CM

## 2024-05-30 DIAGNOSIS — R91.8 OTHER NONSPECIFIC ABNORMAL FINDING OF LUNG FIELD: ICD-10-CM

## 2024-05-30 DIAGNOSIS — G47.33 OBSTRUCTIVE SLEEP APNEA (ADULT) (PEDIATRIC): ICD-10-CM

## 2024-05-30 DIAGNOSIS — R93.89 ABNORMAL FINDINGS ON DIAGNOSTIC IMAGING OF OTHER SPECIFIED BODY STRUCTURES: ICD-10-CM

## 2024-05-30 DIAGNOSIS — J43.2 CENTRILOBULAR EMPHYSEMA: ICD-10-CM

## 2024-05-30 DIAGNOSIS — K21.9 GASTRO-ESOPHAGEAL REFLUX DISEASE W/OUT ESOPHAGITIS: ICD-10-CM

## 2024-05-30 DIAGNOSIS — R06.02 SHORTNESS OF BREATH: ICD-10-CM

## 2024-05-30 PROCEDURE — 94010 BREATHING CAPACITY TEST: CPT

## 2024-05-30 PROCEDURE — 95012 NITRIC OXIDE EXP GAS DETER: CPT

## 2024-05-30 PROCEDURE — 99214 OFFICE O/P EST MOD 30 MIN: CPT | Mod: 25

## 2024-05-30 NOTE — PROCEDURE
[FreeTextEntry1] : PFT reveals normal flows; FEV1 was 2.50 L which is 94.2 % of predicted with a normal flow volume loop. PFT's for performed to evaluate for SOB.    FENO was 13; a normal value being less than 25. Fractional exhaled nitric oxide (FENO) is regarded as a simple, noninvasive method for assessing eosinophilic airway inflammation. Produced by a variety of cells within the lung, nitric oxide (NO) concentrations are generally low in healthy individuals. However, high concentrations of NO appear to be involved in nonspecific host defense mechanisms and chronic inflammatory diseases such as asthma. The American Thoracic Society (ATS) therefore has strongly recommended using FENO to aid in the assessment, management, and long-term monitoring of eosinophilic airway inflammation and asthma, and for identifying steroid responsive individuals whose chronic respiratory symptoms may be caused by airway inflammation. In their 2011 clinical practice guideline, the ATS emphasizes the importance of using FENO.

## 2024-05-30 NOTE — ADDENDUM
For your breathing/asthma:  -- Singulair (montelukast) 10 mg nightly  -- use albuterol 2 puffs every 4-6 hours as needed for cough, wheeze, shortness  of breath, or chest tightness  -- use albuterol 2 puffs 20 minutes prior to exercise if needed  -- notify allergy clinic if you are using albuterol for more than 3 days  -- follow asthma action plan  -- recommend yearly influenza (\"flu\") shots    For your sinus symptoms:  -- if nose is dry, use over the counter saline nasal spray PRIOR to the medication nasal spray  -- use nasal irrigation/rinses (NeilMed) as needed PRIOR to medication nasal spray   -- use distilled/bottled water, or boil water and then cool before using   -- Continue Flonase (fluticasone), 1 spray in each nostril every morning and evening  -- start Singulair (montelukast)10 mg by mouth nightly  -- if symptoms not improved in 2-3 weeks:   -- add Astelin (Azelastine) 1- spray per nostril 1-2 times a day    -- use consistently for 2-3 weeks until symptoms improve, then decrease to as needed  -- if symptoms not improved in 2-3 weeks, can add ONE of the following oral antihistamines:    -- Zyrtec (cetirizine) 10 mg daily   -- Allegra (fexofenadine) 180 mg daily (give on an empty stomach and DO NOT take with fruit juice)   -- Claritin (loratadine) 10 mg daily  -- try to avoid Zyrtec D or Allegra D to reduce effects on your blood pressure  -- if symptoms still not improved, call allergy clinic to discuss    Environmental avoidance measures:  -- see handout    Non-allergic (chemical irritant) avoidance measures:  -- avoid chemicals/irritants (strong scents/odors such as spices, cologne, perfume, tobacco smoke, cleaning bleach, etc) as much as possible  -- wear a mask to minimize inhaling these chemicals/irritants if complete avoidance is not possible  -- any smokers should smoke outside the home if they cannot quit smoking  -- smokers should wear a jacket while smoking outside, and remove the jacket before  [FreeTextEntry1] :  Documented by Allyssa Diaz acting as a scribe for Dr. Raymundo Rai on 05/30/2024 .   All medical record entries made by the Scribe were at my, Dr. Raymundo Rai's direction and personally dictated by me on 05/30/2024 . I have reviewed the chart and agree that the record accurately reflects my personal performance of the history, Physical exam, assessment, and plan. I have also personally directed, reviewed, and agree with the discharge instructions. returning indoors  -- jacket can be left outside the home in the garage or in a separate closet to reduce second hand smoke exposure    Dust mite precautions:  -- Vacuum all carpet at least once a week  -- Wash all bedding (including comforter) in hot water once a week  -- Use dust mite covers for pillow and mattress  -- keep humidity to less <50% using a dehumidifier    Nasal spray instructions:    1.  Gently blow nose prior to use  2.  Shake bottle well  3.  Look down, as if reading a book  4.  Hold the bottle in your RIGHT hand and insert just the tip into the LEFT nostril (note the opposite hand and nostril).    5.  Aim the nozzle toward the middle of your eye/eyebrow, do NOT aim the nozzle towards your nose  6.  Spray the medication and do NOT sniff.  Let the medicine flow naturally to the back of your nose  7.  Spray the medicine 1 time(s)  8.  Change hands so that your LEFT hand is holding the bottle and insert just the tip into the RIGHT nostril  9.  Repeat steps 5-6 on the opposite side

## 2024-05-30 NOTE — HISTORY OF PRESENT ILLNESS
[FreeTextEntry1] : Mr. Hassan is a 84 year old male with a history of abnormal chest CT, allergic rhinitis, asthma, centrilobular emphysema, GERD, KARAN, and SOB presenting to the office today for a follow up visit. His chief complaint is   he notes feeling well  -he notes GERD is controlled -he notes walking and playing golf -he [place] arm [place] -he notes sleep issues and wake sup at 5:30 -6:00 AM -he notes pollen triggers his allergies -he notes starting Vitamin D -he notes his biggest complaints is his golf games -he denies recent travel -he notes having a cough to clear   -Patient denies any headaches, nausea, vomiting, fever, chills, sweats, chest pain, chest pressure, palpitations, wheezing, fatigue, diarrhea, constipation, dysphagia, arthralgias, myalgias, dizziness, leg swelling, leg pain, itchy eyes, itchy ears, dysphonia or sour taste in mouth.

## 2024-05-30 NOTE — ASSESSMENT
[FreeTextEntry1] : Mr. Hassan is 84 year old Male who is stable from a pulmonary perspective, exercising and asymptomatic. He has a history of COVID-19 2/2023, asthma, eosinophilia, COPD, abnormal CT, CAD, OSAS, RLS and neuropathy (feet) - s/p SOLO (?Cardiac vs Anemia vs Other)- stable   His shortness of breath is multifactorial due to: -poor mechanics of breathing -out of shape -pulmonary disease   -COPD   -Eosinophilic Asthma -cardiac disease    -Iron deficiency anemia    -CAD  Problem 1: asthma / COPD -Quiet off all mediations at this time -Asthma is believed to be caused by inherited (genetic) and environmental factor, but its exact cause is unknown. Asthma may be triggered by allergens, lung infections, or irritants in the air. Asthma triggers are different for each person -Inhaler technique reviewed as well as oral hygiene techniques reviewed with patient. Avoidance of cold air, extremes of temperature, rescue inhaler should be used before exercise. Order of medication reviewed with patient. Recommended use of a cool mist humidifier in the bedroom -COPD is a progressive disease and although it can't be cured , appropriate management can slow its progression, reduce frequency and severity of exacerbations, and improve symptoms and the patient quality of life. Hospitalizations are the greatest contributor to the total COPD costs and account for up to 87% of total COPD related costs. Exacerbations are the main cause of admissions and subsequently account for the 40-75% of COPD costs. Inhaled maintenance therapy reduces the incidence of exacerbations in patients with stable COPD. Incorrect inhaler use and nonadherence are major obstacles to achieving COPD treatment goals. Many COPD patients have challenges (impaired inhalation, limited dexterity, reduced cognition: that limit their ability to correctly use their COPD treatment devices resulting in reduced symptom control. Of most importance is smoking cessation and early intervention with respiratory illnesses and contemplation for pulmonary rehab to enhance quality of life.  Problem 1A: Eosinophilic Asthma -Candidate for Fasenra, Nucala, Dupixent - The safety and efficacy of Nucala was established in three double-blind , randomized, placebo controlled trials in patients with severe asthma. Compared to a placebo, patients with severe asthma receiving Nucala hada fever exacerbation requiring hospitalization and.or emergency department visits, and a longer time to first exacerbation. In addition, patients with severe asthma receiving Nucala or Fasenra experienced greater reductions in their daily maintenance oral corticosteroid dose,m while maintaining asthma control compared with patients receiving placebo. Treatment with Nucala did not result in a significant improvement in lung function as measured by the volume of air exhaled by patients in one second. The most common side effects include: headache, injection site reactions back pain, weakness and fatigue; hypersensitivity reactions can occur within hours or days including swelling of the face, mouth and tongue, fainting, dizziness, hives, breathing problems and rash; herpes zoster infections have occurred. The drug is a monoclonal antibody that inhibits interleukin-5 which helps regular eosinophils, a type of white blood cell that contributes to asthma. The over-prodcution of eosinophils can cause inflammation in the lungs, increasing the frequency of asthma attacks. Patients must also take other medications, including high dose inhaled corticosteroids and at least one additional asthma drug.  Problem 1B: Rash c/w atopic dermatitis (controlled) -s/p dermatology evaluation -Initiate Dupixent IF NEEDED -recommended Borage and Flax Seed Oil -Recommended to see Dr. Ruby (dermatology)  Problem 1C: Cardiac(?Valve Dz / CAD) -recommended cardiac eval with Dr. Héctor Rea - pending appointment   Problem 2: lung cancer screening (stable) -f/u chest CT pending Now -Lung Cancer Screening is recommended for people between the ages of 55 and 80 with prior 30+ pack year smoking histories. There is irrefutable evidence for realization of lung cancer screening based on two large randomized control trials demonstrating relative reduction in lung cancer mortality for patients undergoing low-dose CT scanning. Risks and benefits reviewed with the patient.  Problem 2A: COVID-19 9/2023 (resolved) -s/p Paxlovid Rx  Problem 3: GERD -continue Nexium 40 mg QAM  -Rule of 2's- Avoid eating too much, too late, too poorly, too spicy, or two hours before bed -Things to avoid including overeating, spicy foods, tight clothing, eating within three hours of bed, this list is not all inclusive. -For treatment of reflux, possible options discussed including diet control, H2 blockers, PPIs, as well as coating motility agents discussed as treatment options. Timing of meals and proximity of last meal to sleep were discussed. If symptoms persist, a formal gastrointestinal evaluation is needed.  Problem 4: Allergies/ sinus (quiet) -continue nasal saline PRN -recommended OTC antihistamines - get blood work: CBC and vitamin D level Environmental measures for allergies were encouraged including mattress and pillow cover, air purifier, and environmental controls.  Problem 5: OSAS ([place]) -recommended to use "Chin Strap" -intolerable of other treatments - CPAP and dental device -Sleep guard recommended QD at night -continue to use Benadryl PRN -Recommended Oxy-aid -Discussed the risks/associations with coronary artery disease, atrial fibrillation, arrhythmia, memory loss, issues with concentration, stroke risk, hypertension, nocturia, chronic reflux/Interiano's esophagus some but not all inclusive. Treatment options discussed including CPAP/BiPAP machine, oral appliance, ProVent therapy, Oxy-Aid by [employer], new technologies, or positional sleep.  Problem 6: Fatigue -CoQ-10 200 recommended -continue supplemental vitamin D  Problem 7: RLS -off medications at this time Restless Legs Syndrome (RLS), also known as Reyes- Ekbom Disease, is a common sleep -related movement disorder. About 1 in 10 adults in the [place] have problems from restless leg syndrome. It also can be seen in about 2% of children. Women are twice as likely as men to have RLS. People with RLS will have symptoms most often during times when they are less active, especially at bedtime. RLS most often causes an overwhelming urge to move your legs and sometimes other parts of your body. This urge is associated with unpleasant sensations in different parts of the body. The symptoms can be mild to severe and can affect your ability to go to sleep and stay asleep. People with RLS often sleep less at night and feel more tired during the day.  Problem 8: Neuropathy -Continue Neurontin 100 QHS -recommended Neuro Renew -recommended B complex Vitamin -recommended P6 cream -recommended L Glutamine 500 mg BID -recommended to use Alpha- Lipoic Acid 500 mg BID  Problem 9: health maintenance -recommended [employer] stretches for the back -s/p COVID Vaccine Moderna x 4 - not recommending any more boosters at the current time -s/p influenza vaccine 2023 -recommended strep pneumonia vaccines: Prevnar-13 vaccine, followed by Pneumo vaccine 23 on year following (completed) -recommended early intervention for URIs -recommended osteoporosis evaluations -recommended early dermatological evaluations -recommended after the age of 50 to the age of 70, colonoscopy every 5 years -encouraged early intervention   F/u in 6 months - [facility] pt is encouraged to call or fax the office with any questions or concerns.

## 2024-06-11 ENCOUNTER — APPOINTMENT (OUTPATIENT)
Dept: CT IMAGING | Facility: CLINIC | Age: 84
End: 2024-06-11
Payer: MEDICARE

## 2024-06-11 ENCOUNTER — OUTPATIENT (OUTPATIENT)
Dept: OUTPATIENT SERVICES | Facility: HOSPITAL | Age: 84
LOS: 1 days | End: 2024-06-11
Payer: MEDICARE

## 2024-06-11 DIAGNOSIS — R91.8 OTHER NONSPECIFIC ABNORMAL FINDING OF LUNG FIELD: ICD-10-CM

## 2024-06-11 DIAGNOSIS — J43.2 CENTRILOBULAR EMPHYSEMA: ICD-10-CM

## 2024-06-11 DIAGNOSIS — R93.89 ABNORMAL FINDINGS ON DIAGNOSTIC IMAGING OF OTHER SPECIFIED BODY STRUCTURES: ICD-10-CM

## 2024-06-11 DIAGNOSIS — Z98.890 OTHER SPECIFIED POSTPROCEDURAL STATES: Chronic | ICD-10-CM

## 2024-06-11 DIAGNOSIS — Z98.61 CORONARY ANGIOPLASTY STATUS: Chronic | ICD-10-CM

## 2024-06-11 PROCEDURE — 71250 CT THORAX DX C-: CPT | Mod: 26,MH

## 2024-06-11 PROCEDURE — 71250 CT THORAX DX C-: CPT

## 2024-06-19 ENCOUNTER — RX RENEWAL (OUTPATIENT)
Age: 84
End: 2024-06-19

## 2024-06-19 RX ORDER — PANTOPRAZOLE 20 MG/1
20 TABLET, DELAYED RELEASE ORAL
Qty: 90 | Refills: 1 | Status: ACTIVE | COMMUNITY
Start: 2023-07-24 | End: 1900-01-01

## 2024-07-23 ENCOUNTER — APPOINTMENT (OUTPATIENT)
Dept: CARDIOLOGY | Facility: CLINIC | Age: 84
End: 2024-07-23
Payer: MEDICARE

## 2024-07-23 ENCOUNTER — NON-APPOINTMENT (OUTPATIENT)
Age: 84
End: 2024-07-23

## 2024-07-23 VITALS
SYSTOLIC BLOOD PRESSURE: 127 MMHG | DIASTOLIC BLOOD PRESSURE: 82 MMHG | BODY MASS INDEX: 27.25 KG/M2 | HEIGHT: 69 IN | RESPIRATION RATE: 16 BRPM | WEIGHT: 184 LBS | TEMPERATURE: 97.8 F | HEART RATE: 68 BPM | OXYGEN SATURATION: 100 %

## 2024-07-23 DIAGNOSIS — Z95.5 PRESENCE OF CORONARY ANGIOPLASTY IMPLANT AND GRAFT: ICD-10-CM

## 2024-07-23 DIAGNOSIS — I25.10 ATHEROSCLEROTIC HEART DISEASE OF NATIVE CORONARY ARTERY W/OUT ANGINA PECTORIS: ICD-10-CM

## 2024-07-23 DIAGNOSIS — Z78.9 OTHER SPECIFIED HEALTH STATUS: ICD-10-CM

## 2024-07-23 DIAGNOSIS — I34.0 NONRHEUMATIC MITRAL (VALVE) INSUFFICIENCY: ICD-10-CM

## 2024-07-23 DIAGNOSIS — I10 ESSENTIAL (PRIMARY) HYPERTENSION: ICD-10-CM

## 2024-07-23 DIAGNOSIS — E78.5 HYPERLIPIDEMIA, UNSPECIFIED: ICD-10-CM

## 2024-07-23 PROCEDURE — 96372 THER/PROPH/DIAG INJ SC/IM: CPT

## 2024-07-23 PROCEDURE — ZZZZZ: CPT

## 2024-07-23 PROCEDURE — 93000 ELECTROCARDIOGRAM COMPLETE: CPT

## 2024-07-23 PROCEDURE — 93306 TTE W/DOPPLER COMPLETE: CPT

## 2024-07-23 PROCEDURE — 99214 OFFICE O/P EST MOD 30 MIN: CPT | Mod: 25

## 2024-07-23 RX ADMIN — INCLISIRAN 284 MG/1.5ML: 284 INJECTION, SOLUTION SUBCUTANEOUS at 00:00

## 2024-07-23 NOTE — PHYSICAL EXAM
[Well Developed] : well developed [Well Nourished] : well nourished [No Acute Distress] : no acute distress [Normal Venous Pressure] : normal venous pressure [No Carotid Bruit] : no carotid bruit [Carotid Bruit] : carotid bruit [Normal S1, S2] : normal S1, S2 [No Rub] : no rub [Clear Lung Fields] : clear lung fields [Good Air Entry] : good air entry [No Respiratory Distress] : no respiratory distress  [Soft] : abdomen soft [Non Tender] : non-tender [No Masses/organomegaly] : no masses/organomegaly [Normal Bowel Sounds] : normal bowel sounds [Normal Gait] : normal gait [No Edema] : no edema [No Cyanosis] : no cyanosis [No Clubbing] : no clubbing [No Varicosities] : no varicosities [No Rash] : no rash [No Skin Lesions] : no skin lesions [Moves all extremities] : moves all extremities [No Focal Deficits] : no focal deficits [Normal Speech] : normal speech [Alert and Oriented] : alert and oriented [Normal memory] : normal memory [General Appearance - Well Developed] : well developed [Normal Appearance] : normal appearance [Well Groomed] : well groomed [General Appearance - Well Nourished] : well nourished [No Deformities] : no deformities [General Appearance - In No Acute Distress] : no acute distress [Normal Conjunctiva] : the conjunctiva exhibited no abnormalities [Normal Oral Mucosa] : normal oral mucosa [No Oral Pallor] : no oral pallor [Normal Oropharynx] : normal oropharynx [Normal Jugular Venous A Waves Present] : normal jugular venous A waves present [Normal Jugular Venous V Waves Present] : normal jugular venous V waves present [No Jugular Venous Jewell A Waves] : no jugular venous jewell A waves [Respiration, Rhythm And Depth] : normal respiratory rhythm and effort [Exaggerated Use Of Accessory Muscles For Inspiration] : no accessory muscle use [Auscultation Breath Sounds / Voice Sounds] : lungs were clear to auscultation bilaterally [Bowel Sounds] : normal bowel sounds [Abdomen Soft] : soft [Abdomen Tenderness] : non-tender [Abnormal Walk] : normal gait [Nail Clubbing] : no clubbing of the fingernails [Cyanosis, Localized] : no localized cyanosis [Petechial Hemorrhages (___cm)] : no petechial hemorrhages [Skin Color & Pigmentation] : normal skin color and pigmentation [Skin Turgor] : normal skin turgor [] : no rash [No Venous Stasis] : no venous stasis [Oriented To Time, Place, And Person] : oriented to person, place, and time [Impaired Insight] : insight and judgment were intact [Affect] : the affect was normal [No Anxiety] : not feeling anxious [5th Left ICS - MCL] : palpated at the 5th LICS in the midclavicular line [Normal] : normal [No Precordial Heave] : no precordial heave was noted [Normal Rate] : normal [Rhythm Regular] : regular [Normal S1] : normal S1 [Normal S2] : normal S2 [No Gallop] : no gallop heard [No Murmur] : no murmurs heard [II] : a grade 2 [I] : a grade 1 [2+] : left 2+ [No Abnormalities] : the abdominal aorta was not enlarged and no bruit was heard [No Pitting Edema] : no pitting edema present [Rt] : varicose veins of the right leg noted [Lt] : varicose veins of the left leg noted [de-identified] : left side possible bruit or transmitted murmur. [Apical Thrill] : no thrill palpable at the apex [S3] : no S3 [S4] : no S4 [Click] : no click [Pericardial Rub] : no pericardial rub [Right Carotid Bruit] : no bruit heard over the right carotid [Left Carotid Bruit] : no bruit heard over the left carotid [Right Femoral Bruit] : no bruit heard over the right femoral artery [Left Femoral Bruit] : no bruit heard over the left femoral artery

## 2024-07-23 NOTE — DISCUSSION/SUMMARY
[FreeTextEntry1] : This is a 84-year-old male with past medical history significant for coronary artery disease, status post stent to the left anterior descending artery July 18, 2023 (with a mid lesion of 99%), status post stent to right coronary artery, hypertension, hyperlipidemia, statin intolerance, mitral valve regurgitation, who comes in for followup cardiac evaluation. He denies chest pain, palpitations, dizziness or syncope.   He is feeling well, playing 9 holes and sometimes 18 holes of golf, without symptoms. Electrocardiogram done July 23, 2024 demonstrated normal sinus rhythm rate 68 bpm and is otherwise remarkable for 1 atrial premature contraction. The patient received his Leqvio injection today without incident.  He is tolerating the medication well without side effects. He will have a lipid panel blood work in the next 6 to 8 weeks.  PMH: At this point in time he will complete 6 months of dual antiplatelet therapy, discontinue aspirin at the end of January 2024 and continue on Plavix as his antiplatelet agent and follow-up with me in March 2024. He remains on Repatha 140 mg subcutaneously every 2 weeks. Blood work done October 2, 2023 demonstrated a cholesterol of 177, HDL 68, triglycerides 140, LDL direct 90 mg/dL the patient was supposed to restart on Zetia therapy.. If his LDL is not at target of less than 70 mg/dL.  The patient will require the addition of Zetia 10 mg daily. He is also having trouble with his hearing aids and will follow-up with his ENT and hearing aid specialist.  He discontinued his Zetia because he felt it was causing him muscle aches.  I explained to him in detail that the medication is a gastrointestinal mechanism which would not be contributing to muscle aches.  He will take this under consideration. The patient had an abnormal nuclear stress test and coronary CTA. Cardiac catheterization done July 18, 2023 demonstrated a 99% lesion to the mid left anterior descending artery, minor irregularities in the left circumflex artery and first obtuse marginal artery, right coronary artery and right posterior descending artery. He received a drug-eluting stent to his LAD lesion.  He was discharged on Plavix and aspirin PMH: The patient reports hematuria and clots when self catheterizing twice a day. I have explained to him that he needs to be on dual antiplatelet therapy especially acutely in the first 48 weeks after stent. I have asked him to contact his urologist to see if there is something that can be done and find a source of the bleeding. Electrocardiogram done July 24, 2023 demonstrated normal sinus rhythm rate 70 bpm is otherwise remarkable for 2 premature ventricular contractions. The patient is also complaining of some muscle aches on Zetia.  I have told him this is unlikely etiology of his muscle aches.  In the meantime he will discontinue Zetia for now.  He will continue on Repatha therapy. Lipid panel done April 25, 2023 demonstrated cholesterol 134, HDL 58, triglycerides 164, LDL direct 49 mg/dL, non-HDL cholesterol 76 mg/dL.  Hemoglobin A1c of 6.0. Electrocardiogram done March 13, 2023 demonstrated normal sinus rhythm rate 65 bpm is otherwise remarkable for poor baseline with artifact. Electrocardiogram done October 7, 2022 demonstrated normal sinus rhythm rate of 61 bpm is otherwise unremarkable. Echo Doppler examination done July 17, 2022 demonstrated mild posterior mitral valve leaflet prolapse with moderate mitral valve regurgitation, minimal aortic valve regurgitation, mild tricuspid valve regurgitation, mild pulmonic valve regurgitation. Electrocardiogram done November 3, 2021 demonstrate normal sinus rhythm rate 66 bpm is otherwise within normal limits.  Electrocardiogram done May 18, 2021 demonstrate normal sinus rhythm rate 62 bpm and is otherwise unremarkable.  I have asked him to follow-up in 6 weeks to recheck his blood pressure.  He reports his blood pressure is within normal limits and on the doctor's offices.  Lipid panel done October 8, 2022 demonstrated a total cholesterol 164, HDL 58, LDL direct of 87 mg/dL non-HDL cholesterol 106 mg/dL with triglycerides 105 mg/dL.  This will also be repeated at the time of next visit when he really starts Repatha therapy. He will continue on his current dose of Zetia 10 mg daily. Blood work done November 3, 2021 demonstrated direct LDL of 77 mg/dL with total cholesterol 162 mg/dL.  The patient's target LDL is less than 70 mg/dL.  I have recommended he add Zetia 10 mg daily.  I do not believe his previous symptoms on Zetia related to the Zetia treatment. He will have blood work done and 8 to 10 weeks. Electrocardiogram done November 3, 2021 demonstrate normal sinus rhythm rate 66 bpm is otherwise within normal limits. Electrocardiogram done May 18, 2021 demonstrate normal sinus rhythm rate 62 bpm and is otherwise unremarkable. PMH: The patient reports that the Zetia was making him feel groggy.  I have explained to him that I feel his gabapentin 3 times a day is more likely causing that side effect. His LDL cholesterol needs to be less than 70 mg/dL. The patient also complains of bilateral cramping in his calf muscles when walking.  He is no longer on Crestor 5 mg/day.  However given his risk profile, the patient will have an ankle-brachial index done today. .He had recent venous ablation done January 7, 2020 by vascular surgery.  This was complicated by localized infection.  He will followup with his vascular surgeon. Lifestyle modification was reinforced. PMH: He had a cardiac catheterization done November 10, 2011 which revealed a 40% lesion in the left anterior descending artery, luminal irregularities in the distal left main artery and circumflex artery, 20% tubular stenosis at the site of the prior stent. Medical therapy was recommended at that time. He had a bare-metal stent placed in the proximal mid right coronary artery in February 2002.  The patient understands that aerobic exercises must be increased to 40 minutes 4 times per week. A detailed discussion of lifestyle modification was done today. The patient has a good understanding of the diagnosis, and treatment plan. Lifestyle modification was also outlined.

## 2024-07-23 NOTE — ASSESSMENT
[FreeTextEntry1] : This is an 83-year-old male with a past medical history significant for coronary artery disease, s/p stent to right coronary artery, hypertension, hyperlipidemia, statin intolerance, mitral valve regurgitation, comes in for follow-up cardiac evaluation. He is s/p b/l venous ablation on January 7, 2020 with Dr. Brooks.  HPI: He is feeling generally well today and denies chest pain, dizziness, heart palpitations, recent episodes of syncope or falls, SOB, or dyspnea at this time.  Current Medications: Clopidogrel 75 mg daily and Repatha 140 mg injection biweekly.  He has been tolerating his Repatha well but states it is becoming too expensive monthly for him out-of-pocket. He has a history of statin intolerance as well as reaction to Zetia 10 mg daily which prompted him to begin PCSK9 inhibitor therapy. I have explained he is a good candidate for Leqvio therapy, and this would include in office injections at start, 2nd injection 90-days after, and additional injections every 6 months continuing forwards. He is agreeable to initiating this therapy if possible.   PMH: The patient reports that the Zetia was making him feel groggy.  I have explained to him that I feel his gabapentin 3 times a day is more likely causing that side effect. His LDL cholesterol needs to be less than 70 mg/dL. The patient also complains of bilateral cramping in his calf muscles when walking.  He is no longer on Crestor 5 mg/day.   BLOOD PRESSURE: Better controlled.    BLOOD WORK:  *LDL target goal < 70* -New blood work was done 03/25/2024 to evaluate lipid profile, CBC, BMP, hepatic function, A1C and TSH. -Blood work done October 2, 2023 demonstrated a cholesterol of 177, HDL 68, triglycerides 140, LDL direct 90 mg/dL the patient was supposed to restart on Zetia therapy. -Lipid panel done April 25, 2023 demonstrated cholesterol 134, HDL 58, triglycerides 164, LDL direct 49 mg/dL, non-HDL cholesterol 76 mg/dL.  Hemoglobin A1c of 6.0.   TESTING/REPORTS: -EKG done 01/15/2024 demonstrated regular sinus rhythm rate 72 BPM otherwise unremarkable.   -Electrocardiogram done July 24, 2023 demonstrated normal sinus rhythm rate 70 bpm is otherwise remarkable for 2 premature ventricular contractions.  -Cardiac catheterization done July 18, 2023 demonstrated a 99% lesion to the mid left anterior descending artery, minor irregularities in the left circumflex artery and first obtuse marginal artery, right coronary artery and right posterior descending artery.  -The patient had an abnormal nuclear stress test which was done on May 16th 2023 which demonstrated small, mild defects in distal inferior and inferior apical walls that are reversible consistent with ischemia. There are small, moderate defects in the basal inferior, basal inferior lateral walls that were fixed consistent with infarct.  *At this time during our visit the patient is not symptomatic and does not have any chest pain or shortness of breath. He states that he played golf all day yesterday and was feeling well otherwise. He does have a history of 2 coronary artery stents however is not on antiplatelet therapy.  -EKG done Apr 25, 2023 which demonstrated regular sinus rhythm with nonspecific ST-T wave changes, BPM of 63.  -Electrocardiogram done March 13, 2023 demonstrated normal sinus rhythm rate 65 bpm is otherwise remarkable for poor baseline with artifact.  -Electrocardiogram done October 7, 2022 demonstrated normal sinus rhythm rate of 61 bpm is otherwise unremarkable.  -Echo Doppler examination done July 17, 2022 demonstrated mild posterior mitral valve leaflet prolapse with moderate mitral valve regurgitation, minimal aortic valve regurgitation, mild tricuspid valve regurgitation, mild pulmonic valve regurgitation.  -Electrocardiogram done November 3, 2021 demonstrate normal sinus rhythm rate 66 bpm is otherwise within normal limits.  -Electrocardiogram done May 18, 2021 demonstrate normal sinus rhythm rate 62 bpm and is otherwise unremarkable.  -PMH: He had a cardiac catheterization done November 10, 2011 which revealed a 40% lesion in the left anterior descending artery, luminal irregularities in the distal left main artery and circumflex artery, 20% tubular stenosis at the site of the prior stent. Medical therapy was recommended at that time.  -He had a bare-metal stent placed in the proximal mid right coronary artery in February 2002.   PLAN: -Leqvio start forms signed at office visit today to initiate prior authorization. I have given him the information sheets about the [facility] and explained that his injections would be ordered by our office and given in person at future appointments.  -He will continue with his usual medications and will contact the office if he is having any complaints between now and their next follow up appointment.   I have discussed the plan of care with ROSCOE CRUZ and he will follow up in 3 months. They are compliant with all of their medications.     The patient understands that aerobic exercises must be increased to 40 minutes 4 times/week and a detailed discussion of lifestyle modification was done today.   The patient has a good understanding of the diagnosis, treatment plan and lifestyle modification.   They will contact me at the office for any questions with their care or any changes in their health status.   The patient was discussed with supervision physician Dr. Héctor Rea at the time of the visit and the plan of care will be carried out as noted above.     EARLENE Mas NP

## 2024-07-24 RX ORDER — INCLISIRAN 284 MG/1.5ML
284 INJECTION, SOLUTION SUBCUTANEOUS
Qty: 0 | Refills: 0 | Status: COMPLETED | OUTPATIENT
Start: 2024-07-23

## 2024-09-26 ENCOUNTER — RX RENEWAL (OUTPATIENT)
Age: 84
End: 2024-09-26

## 2024-10-15 ENCOUNTER — NON-APPOINTMENT (OUTPATIENT)
Age: 84
End: 2024-10-15

## 2024-10-15 DIAGNOSIS — I25.10 ATHEROSCLEROTIC HEART DISEASE OF NATIVE CORONARY ARTERY W/OUT ANGINA PECTORIS: ICD-10-CM

## 2024-10-15 RX ORDER — EZETIMIBE 10 MG/1
10 TABLET ORAL DAILY
Qty: 90 | Refills: 1 | Status: ACTIVE | COMMUNITY
Start: 2024-10-15 | End: 1900-01-01

## 2024-11-27 ENCOUNTER — NON-APPOINTMENT (OUTPATIENT)
Age: 84
End: 2024-11-27

## 2024-11-27 ENCOUNTER — APPOINTMENT (OUTPATIENT)
Dept: CARDIOLOGY | Facility: CLINIC | Age: 84
End: 2024-11-27
Payer: MEDICARE

## 2024-11-27 VITALS
DIASTOLIC BLOOD PRESSURE: 79 MMHG | WEIGHT: 183.8 LBS | SYSTOLIC BLOOD PRESSURE: 125 MMHG | OXYGEN SATURATION: 100 % | BODY MASS INDEX: 27.22 KG/M2 | TEMPERATURE: 97.6 F | HEIGHT: 69 IN | HEART RATE: 65 BPM

## 2024-11-27 DIAGNOSIS — Z78.9 OTHER SPECIFIED HEALTH STATUS: ICD-10-CM

## 2024-11-27 DIAGNOSIS — Z95.5 PRESENCE OF CORONARY ANGIOPLASTY IMPLANT AND GRAFT: ICD-10-CM

## 2024-11-27 DIAGNOSIS — I34.0 NONRHEUMATIC MITRAL (VALVE) INSUFFICIENCY: ICD-10-CM

## 2024-11-27 DIAGNOSIS — I25.10 ATHEROSCLEROTIC HEART DISEASE OF NATIVE CORONARY ARTERY W/OUT ANGINA PECTORIS: ICD-10-CM

## 2024-11-27 DIAGNOSIS — I10 ESSENTIAL (PRIMARY) HYPERTENSION: ICD-10-CM

## 2024-11-27 DIAGNOSIS — E78.5 HYPERLIPIDEMIA, UNSPECIFIED: ICD-10-CM

## 2024-11-27 DIAGNOSIS — Z01.810 ENCOUNTER FOR PREPROCEDURAL CARDIOVASCULAR EXAMINATION: ICD-10-CM

## 2024-11-27 PROCEDURE — 99215 OFFICE O/P EST HI 40 MIN: CPT

## 2024-11-27 PROCEDURE — G2211 COMPLEX E/M VISIT ADD ON: CPT

## 2024-11-27 PROCEDURE — 93000 ELECTROCARDIOGRAM COMPLETE: CPT | Mod: NC

## 2024-12-03 RX ORDER — METHENAMINE MANDELATE 1000 MG/1
1 TABLET, FILM COATED ORAL
Refills: 0 | Status: ACTIVE | COMMUNITY
Start: 2024-12-03

## 2024-12-03 RX ORDER — GALANTAMINE 8 MG/1
8 TABLET, FILM COATED ORAL
Refills: 0 | Status: ACTIVE | COMMUNITY
Start: 2024-12-03

## 2024-12-16 ENCOUNTER — RX RENEWAL (OUTPATIENT)
Age: 84
End: 2024-12-16

## 2024-12-23 ENCOUNTER — APPOINTMENT (OUTPATIENT)
Dept: PULMONOLOGY | Facility: CLINIC | Age: 84
End: 2024-12-23
Payer: MEDICARE

## 2024-12-23 DIAGNOSIS — J43.2 CENTRILOBULAR EMPHYSEMA: ICD-10-CM

## 2024-12-23 DIAGNOSIS — J30.9 ALLERGIC RHINITIS, UNSPECIFIED: ICD-10-CM

## 2024-12-23 DIAGNOSIS — J45.909 UNSPECIFIED ASTHMA, UNCOMPLICATED: ICD-10-CM

## 2024-12-23 DIAGNOSIS — J82.83 EOSINOPHILIC ASTHMA: ICD-10-CM

## 2024-12-23 DIAGNOSIS — K21.9 GASTRO-ESOPHAGEAL REFLUX DISEASE W/OUT ESOPHAGITIS: ICD-10-CM

## 2024-12-23 PROCEDURE — 94727 GAS DIL/WSHOT DETER LNG VOL: CPT

## 2024-12-23 PROCEDURE — 99214 OFFICE O/P EST MOD 30 MIN: CPT | Mod: 25

## 2024-12-23 PROCEDURE — 94010 BREATHING CAPACITY TEST: CPT

## 2024-12-23 PROCEDURE — ZZZZZ: CPT

## 2024-12-23 PROCEDURE — 94729 DIFFUSING CAPACITY: CPT

## 2025-01-23 ENCOUNTER — APPOINTMENT (OUTPATIENT)
Dept: CARDIOLOGY | Facility: CLINIC | Age: 85
End: 2025-01-23

## 2025-01-23 VITALS
DIASTOLIC BLOOD PRESSURE: 78 MMHG | HEART RATE: 65 BPM | OXYGEN SATURATION: 99 % | TEMPERATURE: 97.3 F | HEIGHT: 69 IN | SYSTOLIC BLOOD PRESSURE: 124 MMHG | RESPIRATION RATE: 16 BRPM | WEIGHT: 180 LBS | BODY MASS INDEX: 26.66 KG/M2

## 2025-01-23 DIAGNOSIS — I25.10 ATHEROSCLEROTIC HEART DISEASE OF NATIVE CORONARY ARTERY W/OUT ANGINA PECTORIS: ICD-10-CM

## 2025-01-23 DIAGNOSIS — Z78.9 OTHER SPECIFIED HEALTH STATUS: ICD-10-CM

## 2025-01-23 DIAGNOSIS — I10 ESSENTIAL (PRIMARY) HYPERTENSION: ICD-10-CM

## 2025-01-23 DIAGNOSIS — I34.0 NONRHEUMATIC MITRAL (VALVE) INSUFFICIENCY: ICD-10-CM

## 2025-01-23 DIAGNOSIS — Z95.5 PRESENCE OF CORONARY ANGIOPLASTY IMPLANT AND GRAFT: ICD-10-CM

## 2025-01-23 PROCEDURE — 96372 THER/PROPH/DIAG INJ SC/IM: CPT

## 2025-01-23 PROCEDURE — 99214 OFFICE O/P EST MOD 30 MIN: CPT | Mod: 25

## 2025-01-23 RX ADMIN — INCLISIRAN 284 MG/1.5ML: 284 INJECTION, SOLUTION SUBCUTANEOUS at 00:00

## 2025-01-29 RX ORDER — INCLISIRAN 284 MG/1.5ML
284 INJECTION, SOLUTION SUBCUTANEOUS
Qty: 0 | Refills: 0 | Status: COMPLETED | OUTPATIENT
Start: 2025-01-23

## 2025-05-27 ENCOUNTER — RX RENEWAL (OUTPATIENT)
Age: 85
End: 2025-05-27

## 2025-07-02 ENCOUNTER — APPOINTMENT (OUTPATIENT)
Dept: PULMONOLOGY | Facility: CLINIC | Age: 85
End: 2025-07-02
Payer: MEDICARE

## 2025-07-02 VITALS
WEIGHT: 182 LBS | HEIGHT: 69 IN | RESPIRATION RATE: 16 BRPM | TEMPERATURE: 95.7 F | BODY MASS INDEX: 26.96 KG/M2 | OXYGEN SATURATION: 97 % | DIASTOLIC BLOOD PRESSURE: 80 MMHG | HEART RATE: 75 BPM | SYSTOLIC BLOOD PRESSURE: 121 MMHG

## 2025-07-02 PROBLEM — R26.89 POOR BALANCE: Status: ACTIVE | Noted: 2025-07-02

## 2025-07-02 PROCEDURE — 95012 NITRIC OXIDE EXP GAS DETER: CPT

## 2025-07-02 PROCEDURE — 94010 BREATHING CAPACITY TEST: CPT

## 2025-07-02 PROCEDURE — 94727 GAS DIL/WSHOT DETER LNG VOL: CPT

## 2025-07-02 PROCEDURE — ZZZZZ: CPT

## 2025-07-02 PROCEDURE — 99214 OFFICE O/P EST MOD 30 MIN: CPT | Mod: 25

## 2025-07-02 PROCEDURE — 94729 DIFFUSING CAPACITY: CPT

## 2025-07-25 ENCOUNTER — APPOINTMENT (OUTPATIENT)
Dept: CARDIOLOGY | Facility: CLINIC | Age: 85
End: 2025-07-25
Payer: MEDICARE

## 2025-07-25 VITALS
RESPIRATION RATE: 16 BRPM | DIASTOLIC BLOOD PRESSURE: 78 MMHG | SYSTOLIC BLOOD PRESSURE: 122 MMHG | TEMPERATURE: 97.7 F | WEIGHT: 185 LBS | OXYGEN SATURATION: 99 % | HEIGHT: 69 IN | BODY MASS INDEX: 27.4 KG/M2 | HEART RATE: 64 BPM

## 2025-07-25 DIAGNOSIS — Z95.5 PRESENCE OF CORONARY ANGIOPLASTY IMPLANT AND GRAFT: ICD-10-CM

## 2025-07-25 DIAGNOSIS — E78.5 HYPERLIPIDEMIA, UNSPECIFIED: ICD-10-CM

## 2025-07-25 DIAGNOSIS — Z78.9 OTHER SPECIFIED HEALTH STATUS: ICD-10-CM

## 2025-07-25 DIAGNOSIS — I25.10 ATHEROSCLEROTIC HEART DISEASE OF NATIVE CORONARY ARTERY W/OUT ANGINA PECTORIS: ICD-10-CM

## 2025-07-25 DIAGNOSIS — I10 ESSENTIAL (PRIMARY) HYPERTENSION: ICD-10-CM

## 2025-07-25 DIAGNOSIS — I34.0 NONRHEUMATIC MITRAL (VALVE) INSUFFICIENCY: ICD-10-CM

## 2025-07-25 PROCEDURE — 99214 OFFICE O/P EST MOD 30 MIN: CPT | Mod: 25

## 2025-07-25 PROCEDURE — ZZZZZ: CPT

## 2025-07-25 PROCEDURE — 93000 ELECTROCARDIOGRAM COMPLETE: CPT

## 2025-07-25 PROCEDURE — 96372 THER/PROPH/DIAG INJ SC/IM: CPT

## 2025-07-25 RX ORDER — INCLISIRAN 284 MG/1.5ML
284 INJECTION, SOLUTION SUBCUTANEOUS
Refills: 0 | Status: COMPLETED | OUTPATIENT
Start: 2025-07-25

## 2025-07-25 RX ADMIN — INCLISIRAN 284 MG/1.5ML: 284 INJECTION, SOLUTION SUBCUTANEOUS at 00:00
